# Patient Record
Sex: FEMALE | Race: BLACK OR AFRICAN AMERICAN | Employment: FULL TIME | URBAN - METROPOLITAN AREA
[De-identification: names, ages, dates, MRNs, and addresses within clinical notes are randomized per-mention and may not be internally consistent; named-entity substitution may affect disease eponyms.]

---

## 2018-05-18 ENCOUNTER — HOSPITAL ENCOUNTER (OUTPATIENT)
Dept: MAMMOGRAPHY | Age: 58
Discharge: HOME | End: 2018-05-20
Payer: COMMERCIAL

## 2018-05-18 DIAGNOSIS — Z12.31 VISIT FOR SCREENING MAMMOGRAM: ICD-10-CM

## 2018-05-18 PROCEDURE — 77067 SCR MAMMO BI INCL CAD: CPT

## 2023-03-30 ENCOUNTER — TELEPHONE (OUTPATIENT)
Dept: PRIMARY CARE | Facility: CLINIC | Age: 63
End: 2023-03-30

## 2023-05-01 LAB
ALANINE AMINOTRANSFERASE (SGPT) (U/L) IN SER/PLAS: 13 U/L (ref 7–45)
ALBUMIN (G/DL) IN SER/PLAS: 4.5 G/DL (ref 3.4–5)
ALKALINE PHOSPHATASE (U/L) IN SER/PLAS: 88 U/L (ref 33–136)
ANION GAP IN SER/PLAS: 14 MMOL/L (ref 10–20)
ASPARTATE AMINOTRANSFERASE (SGOT) (U/L) IN SER/PLAS: 16 U/L (ref 9–39)
BASOPHILS (10*3/UL) IN BLOOD BY AUTOMATED COUNT: 0.04 X10E9/L (ref 0–0.1)
BASOPHILS/100 LEUKOCYTES IN BLOOD BY AUTOMATED COUNT: 0.8 % (ref 0–2)
BILIRUBIN TOTAL (MG/DL) IN SER/PLAS: 1 MG/DL (ref 0–1.2)
C REACTIVE PROTEIN (MG/L) IN SER/PLAS: 0.21 MG/DL
CALCIDIOL (25 OH VITAMIN D3) (NG/ML) IN SER/PLAS: 29 NG/ML
CALCIUM (MG/DL) IN SER/PLAS: 10.2 MG/DL (ref 8.6–10.6)
CARBON DIOXIDE, TOTAL (MMOL/L) IN SER/PLAS: 27 MMOL/L (ref 21–32)
CHLORIDE (MMOL/L) IN SER/PLAS: 105 MMOL/L (ref 98–107)
CREATININE (MG/DL) IN SER/PLAS: 0.76 MG/DL (ref 0.5–1.05)
EOSINOPHILS (10*3/UL) IN BLOOD BY AUTOMATED COUNT: 0.13 X10E9/L (ref 0–0.7)
EOSINOPHILS/100 LEUKOCYTES IN BLOOD BY AUTOMATED COUNT: 2.7 % (ref 0–6)
ERYTHROCYTE DISTRIBUTION WIDTH (RATIO) BY AUTOMATED COUNT: 15.1 % (ref 11.5–14.5)
ERYTHROCYTE MEAN CORPUSCULAR HEMOGLOBIN CONCENTRATION (G/DL) BY AUTOMATED: 31.6 G/DL (ref 32–36)
ERYTHROCYTE MEAN CORPUSCULAR VOLUME (FL) BY AUTOMATED COUNT: 91 FL (ref 80–100)
ERYTHROCYTES (10*6/UL) IN BLOOD BY AUTOMATED COUNT: 4.18 X10E12/L (ref 4–5.2)
FERRITIN (UG/LL) IN SER/PLAS: 189 UG/L (ref 8–150)
GFR FEMALE: 87 ML/MIN/1.73M2
GLUCOSE (MG/DL) IN SER/PLAS: 98 MG/DL (ref 74–99)
HEMATOCRIT (%) IN BLOOD BY AUTOMATED COUNT: 38 % (ref 36–46)
HEMOGLOBIN (G/DL) IN BLOOD: 12 G/DL (ref 12–16)
IGA (MG/DL) IN SER/PLAS: 325 MG/DL (ref 70–400)
IGG (MG/DL) IN SER/PLAS: 1310 MG/DL (ref 700–1600)
IGM (MG/DL) IN SER/PLAS: 108 MG/DL (ref 40–230)
IMMATURE GRANULOCYTES/100 LEUKOCYTES IN BLOOD BY AUTOMATED COUNT: 0.2 % (ref 0–0.9)
IRON (UG/DL) IN SER/PLAS: 129 UG/DL (ref 35–150)
IRON BINDING CAPACITY (UG/DL) IN SER/PLAS: 365 UG/DL (ref 240–445)
IRON SATURATION (%) IN SER/PLAS: 35 % (ref 25–45)
LEUKOCYTES (10*3/UL) IN BLOOD BY AUTOMATED COUNT: 4.8 X10E9/L (ref 4.4–11.3)
LYMPHOCYTES (10*3/UL) IN BLOOD BY AUTOMATED COUNT: 1.88 X10E9/L (ref 1.2–4.8)
LYMPHOCYTES/100 LEUKOCYTES IN BLOOD BY AUTOMATED COUNT: 39.4 % (ref 13–44)
MONOCYTES (10*3/UL) IN BLOOD BY AUTOMATED COUNT: 0.26 X10E9/L (ref 0.1–1)
MONOCYTES/100 LEUKOCYTES IN BLOOD BY AUTOMATED COUNT: 5.5 % (ref 2–10)
NEUTROPHILS (10*3/UL) IN BLOOD BY AUTOMATED COUNT: 2.45 X10E9/L (ref 1.2–7.7)
NEUTROPHILS/100 LEUKOCYTES IN BLOOD BY AUTOMATED COUNT: 51.4 % (ref 40–80)
NRBC (PER 100 WBCS) BY AUTOMATED COUNT: 0 /100 WBC (ref 0–0)
PLATELETS (10*3/UL) IN BLOOD AUTOMATED COUNT: 219 X10E9/L (ref 150–450)
POTASSIUM (MMOL/L) IN SER/PLAS: 3.6 MMOL/L (ref 3.5–5.3)
PROTEIN TOTAL: 7.5 G/DL (ref 6.4–8.2)
RHEUMATOID FACTOR (IU/ML) IN SERUM OR PLASMA: 27 IU/ML (ref 0–15)
SEDIMENTATION RATE, ERYTHROCYTE: 32 MM/H (ref 0–30)
SODIUM (MMOL/L) IN SER/PLAS: 142 MMOL/L (ref 136–145)
UREA NITROGEN (MG/DL) IN SER/PLAS: 10 MG/DL (ref 6–23)

## 2023-05-03 LAB
ANTI-NUCLEAR ANTIBODY (ANA): NEGATIVE
HLAB27 TYPING: NEGATIVE

## 2023-05-04 LAB — CITRULLINE ANTIBODY, IGG: 205 UNITS (ref 0–19)

## 2023-05-24 DIAGNOSIS — R73.03 PREDIABETES: Primary | ICD-10-CM

## 2023-05-24 PROBLEM — M54.31 SCIATICA OF RIGHT SIDE: Status: ACTIVE | Noted: 2023-05-24

## 2023-05-24 PROBLEM — J45.909 ASTHMA (HHS-HCC): Status: ACTIVE | Noted: 2023-05-24

## 2023-05-24 PROBLEM — F32.A DEPRESSION: Status: ACTIVE | Noted: 2023-05-24

## 2023-05-24 PROBLEM — F51.04 CHRONIC INSOMNIA: Status: ACTIVE | Noted: 2023-05-24

## 2023-05-24 PROBLEM — E53.8 B12 DEFICIENCY: Status: ACTIVE | Noted: 2023-05-24

## 2023-05-24 PROBLEM — M79.89 LEG SWELLING: Status: ACTIVE | Noted: 2023-05-24

## 2023-05-24 PROBLEM — G93.5: Status: ACTIVE | Noted: 2023-05-24

## 2023-05-24 PROBLEM — I10 HTN, GOAL BELOW 130/80: Status: ACTIVE | Noted: 2023-05-24

## 2023-05-24 PROBLEM — R06.02 SHORT OF BREATH ON EXERTION: Status: ACTIVE | Noted: 2023-05-24

## 2023-05-24 PROBLEM — M25.50 POLYARTHRALGIA: Status: ACTIVE | Noted: 2023-05-24

## 2023-05-24 PROBLEM — Z98.1 S/P LUMBAR SPINAL FUSION: Status: ACTIVE | Noted: 2023-05-24

## 2023-05-24 PROBLEM — R39.9 UTI SYMPTOMS: Status: ACTIVE | Noted: 2023-05-24

## 2023-05-24 PROBLEM — D12.6 ADENOMATOUS COLON POLYP: Status: ACTIVE | Noted: 2023-05-24

## 2023-05-24 PROBLEM — N39.46 MIXED INCONTINENCE, URGE AND STRESS (MALE) (FEMALE): Status: ACTIVE | Noted: 2023-05-24

## 2023-05-24 PROBLEM — R53.83 FATIGUE: Status: ACTIVE | Noted: 2023-05-24

## 2023-05-24 PROBLEM — M71.38 SYNOVIAL CYST OF LUMBAR SPINE: Status: ACTIVE | Noted: 2023-05-24

## 2023-05-24 PROBLEM — J30.9 ALLERGIC RHINITIS: Status: ACTIVE | Noted: 2023-05-24

## 2023-05-24 PROBLEM — F32.0 MILD MAJOR DEPRESSION (CMS-HCC): Status: ACTIVE | Noted: 2023-05-24

## 2023-05-24 PROBLEM — M17.12 ARTHRITIS OF KNEE, LEFT: Status: ACTIVE | Noted: 2023-05-24

## 2023-05-24 PROBLEM — M54.16 RIGHT LUMBAR RADICULOPATHY: Status: ACTIVE | Noted: 2023-05-24

## 2023-05-24 PROBLEM — M16.11 ARTHRITIS OF RIGHT HIP: Status: ACTIVE | Noted: 2023-05-24

## 2023-05-24 PROBLEM — M17.12 PRIMARY LOCALIZED OSTEOARTHROSIS OF LEFT LOWER LEG: Status: ACTIVE | Noted: 2023-05-24

## 2023-05-24 PROBLEM — M05.9 SEROPOSITIVE RHEUMATOID ARTHRITIS (MULTI): Status: ACTIVE | Noted: 2023-05-24

## 2023-05-24 PROBLEM — H52.00 HYPEROPIA: Status: ACTIVE | Noted: 2023-05-24

## 2023-05-24 PROBLEM — J01.90 ACUTE SINUSITIS: Status: ACTIVE | Noted: 2023-05-24

## 2023-05-24 PROBLEM — R39.11 URINARY HESITANCY: Status: ACTIVE | Noted: 2023-05-24

## 2023-05-24 PROBLEM — D64.9 ANEMIA: Status: ACTIVE | Noted: 2023-05-24

## 2023-05-24 PROBLEM — F43.21 GRIEF: Status: ACTIVE | Noted: 2023-05-24

## 2023-05-24 PROBLEM — H25.13 AGE-RELATED NUCLEAR CATARACT OF BOTH EYES: Status: ACTIVE | Noted: 2023-05-24

## 2023-05-24 PROBLEM — H53.9 CHANGE IN VISION: Status: ACTIVE | Noted: 2023-05-24

## 2023-05-24 PROBLEM — M25.662 DECREASED RANGE OF MOTION OF LEFT KNEE: Status: ACTIVE | Noted: 2023-05-24

## 2023-05-24 PROBLEM — R10.2 FEMALE PELVIC PAIN: Status: ACTIVE | Noted: 2023-05-24

## 2023-05-24 PROBLEM — G47.21 DELAYED SLEEP PHASE SYNDROME: Status: ACTIVE | Noted: 2023-05-24

## 2023-05-24 PROBLEM — I10 BENIGN HYPERTENSION: Status: ACTIVE | Noted: 2023-05-24

## 2023-05-24 PROBLEM — E04.2 MULTINODULAR GOITER: Status: ACTIVE | Noted: 2023-05-24

## 2023-05-24 PROBLEM — E78.5 HYPERLIPIDEMIA: Status: ACTIVE | Noted: 2023-05-24

## 2023-05-24 PROBLEM — N81.89 PELVIC FLOOR WEAKNESS: Status: ACTIVE | Noted: 2023-05-24

## 2023-05-24 PROBLEM — N39.41 URGE INCONTINENCE OF URINE: Status: ACTIVE | Noted: 2023-05-24

## 2023-05-24 PROBLEM — R35.0 URINARY FREQUENCY: Status: ACTIVE | Noted: 2023-05-24

## 2023-05-24 PROBLEM — R06.83 SNORING: Status: ACTIVE | Noted: 2023-05-24

## 2023-05-24 PROBLEM — Z96.652 S/P LEFT UNICOMPARTMENTAL KNEE REPLACEMENT: Status: ACTIVE | Noted: 2023-05-24

## 2023-05-24 PROBLEM — M79.606 LEG PAIN: Status: ACTIVE | Noted: 2023-05-24

## 2023-05-24 PROBLEM — R07.9 CHEST PAIN: Status: ACTIVE | Noted: 2023-05-24

## 2023-05-24 PROBLEM — M25.551 PAIN OF RIGHT HIP JOINT: Status: ACTIVE | Noted: 2023-05-24

## 2023-05-24 PROBLEM — M43.16 SPONDYLOLISTHESIS OF LUMBAR REGION: Status: ACTIVE | Noted: 2023-05-24

## 2023-05-24 PROBLEM — Z86.69 HISTORY OF CHIARI MALFORMATION: Status: ACTIVE | Noted: 2023-05-24

## 2023-05-24 PROBLEM — M25.562 LEFT KNEE PAIN: Status: ACTIVE | Noted: 2023-05-24

## 2023-05-24 PROBLEM — N95.2 ATROPHIC VAGINITIS: Status: ACTIVE | Noted: 2023-05-24

## 2023-05-24 PROBLEM — L85.3 XEROSIS CUTIS: Status: ACTIVE | Noted: 2023-05-24

## 2023-05-24 PROBLEM — M48.062 SPINAL STENOSIS OF LUMBAR REGION WITH NEUROGENIC CLAUDICATION: Status: ACTIVE | Noted: 2023-05-24

## 2023-05-24 PROBLEM — M17.9 OSTEOARTHRITIS OF KNEE: Status: ACTIVE | Noted: 2023-05-24

## 2023-05-24 RX ORDER — ATORVASTATIN CALCIUM 40 MG/1
1 TABLET, FILM COATED ORAL DAILY
COMMUNITY
Start: 2022-05-31 | End: 2024-05-17 | Stop reason: WASHOUT

## 2023-05-24 RX ORDER — FLUTICASONE PROPIONATE 50 MCG
SPRAY, SUSPENSION (ML) NASAL
COMMUNITY
Start: 2022-08-18 | End: 2023-12-12 | Stop reason: ALTCHOICE

## 2023-05-24 RX ORDER — TRAMADOL HYDROCHLORIDE 50 MG/1
TABLET ORAL
COMMUNITY
Start: 2022-04-13 | End: 2023-12-12 | Stop reason: ALTCHOICE

## 2023-05-24 RX ORDER — TRIAMCINOLONE ACETONIDE 40 MG/ML
INJECTION, SUSPENSION INTRA-ARTICULAR; INTRAMUSCULAR
COMMUNITY
Start: 2023-05-17 | End: 2023-12-12 | Stop reason: ALTCHOICE

## 2023-05-24 RX ORDER — CHLORHEXIDINE GLUCONATE 40 MG/ML
SOLUTION TOPICAL
COMMUNITY
Start: 2022-03-22 | End: 2023-12-12 | Stop reason: ALTCHOICE

## 2023-05-24 RX ORDER — FOLIC ACID 1 MG/1
1 TABLET ORAL DAILY
COMMUNITY
Start: 2023-05-17 | End: 2023-12-12 | Stop reason: ALTCHOICE

## 2023-05-24 RX ORDER — METFORMIN HYDROCHLORIDE 500 MG/1
1 TABLET ORAL
COMMUNITY
Start: 2021-08-26 | End: 2023-05-24 | Stop reason: SDUPTHER

## 2023-05-24 RX ORDER — METHOTREXATE 2.5 MG/1
TABLET ORAL
COMMUNITY
Start: 2023-05-17 | End: 2023-12-12 | Stop reason: ALTCHOICE

## 2023-05-24 RX ORDER — ABALOPARATIDE 2000 UG/ML
INJECTION, SOLUTION SUBCUTANEOUS
COMMUNITY
Start: 2023-03-26 | End: 2024-05-17 | Stop reason: WASHOUT

## 2023-05-24 RX ORDER — SULFAMETHOXAZOLE AND TRIMETHOPRIM 800; 160 MG/1; MG/1
1 TABLET ORAL 2 TIMES DAILY
COMMUNITY
Start: 2022-08-18 | End: 2023-12-12 | Stop reason: SDUPTHER

## 2023-05-24 RX ORDER — ALBUTEROL SULFATE 90 UG/1
AEROSOL, METERED RESPIRATORY (INHALATION)
COMMUNITY
Start: 2020-08-31 | End: 2023-12-12 | Stop reason: SDUPTHER

## 2023-05-24 RX ORDER — AMLODIPINE BESYLATE 2.5 MG/1
1 TABLET ORAL DAILY
COMMUNITY
Start: 2022-12-05 | End: 2024-05-17 | Stop reason: WASHOUT

## 2023-05-24 RX ORDER — METFORMIN HYDROCHLORIDE 500 MG/1
500 TABLET ORAL
Qty: 180 TABLET | Refills: 1 | Status: SHIPPED | OUTPATIENT
Start: 2023-05-24 | End: 2023-12-12 | Stop reason: SDUPTHER

## 2023-05-24 RX ORDER — OXYBUTYNIN CHLORIDE 10 MG/1
1 TABLET, EXTENDED RELEASE ORAL DAILY
COMMUNITY
Start: 2020-07-29 | End: 2024-05-17 | Stop reason: WASHOUT

## 2023-05-24 RX ORDER — ESTRADIOL 0.1 MG/G
CREAM VAGINAL
COMMUNITY
Start: 2022-10-13 | End: 2023-12-12 | Stop reason: ALTCHOICE

## 2023-05-24 RX ORDER — FERROUS SULFATE 325(65) MG
1 TABLET, DELAYED RELEASE (ENTERIC COATED) ORAL
COMMUNITY
Start: 2022-05-31 | End: 2023-12-12 | Stop reason: ALTCHOICE

## 2023-10-05 ENCOUNTER — TELEPHONE (OUTPATIENT)
Dept: PRIMARY CARE | Facility: CLINIC | Age: 63
End: 2023-10-05
Payer: COMMERCIAL

## 2023-10-05 DIAGNOSIS — Z12.31 BREAST CANCER SCREENING BY MAMMOGRAM: Primary | ICD-10-CM

## 2023-10-24 ENCOUNTER — ANCILLARY PROCEDURE (OUTPATIENT)
Dept: RADIOLOGY | Facility: CLINIC | Age: 63
End: 2023-10-24
Payer: COMMERCIAL

## 2023-10-24 DIAGNOSIS — Z12.31 BREAST CANCER SCREENING BY MAMMOGRAM: ICD-10-CM

## 2023-10-24 PROCEDURE — 77063 BREAST TOMOSYNTHESIS BI: CPT | Mod: BILATERAL PROCEDURE | Performed by: RADIOLOGY

## 2023-10-24 PROCEDURE — 77067 SCR MAMMO BI INCL CAD: CPT | Mod: BILATERAL PROCEDURE | Performed by: RADIOLOGY

## 2023-10-24 PROCEDURE — 77063 BREAST TOMOSYNTHESIS BI: CPT

## 2023-12-12 ENCOUNTER — OFFICE VISIT (OUTPATIENT)
Dept: PRIMARY CARE | Facility: CLINIC | Age: 63
End: 2023-12-12
Payer: COMMERCIAL

## 2023-12-12 VITALS
RESPIRATION RATE: 15 BRPM | HEIGHT: 65 IN | WEIGHT: 183.3 LBS | HEART RATE: 80 BPM | DIASTOLIC BLOOD PRESSURE: 62 MMHG | BODY MASS INDEX: 30.54 KG/M2 | TEMPERATURE: 97.9 F | OXYGEN SATURATION: 98 % | SYSTOLIC BLOOD PRESSURE: 110 MMHG

## 2023-12-12 DIAGNOSIS — Z00.00 ANNUAL PHYSICAL EXAM: Primary | ICD-10-CM

## 2023-12-12 DIAGNOSIS — R39.9 UTI SYMPTOMS: ICD-10-CM

## 2023-12-12 DIAGNOSIS — M05.9 SEROPOSITIVE RHEUMATOID ARTHRITIS (MULTI): ICD-10-CM

## 2023-12-12 DIAGNOSIS — I10 BENIGN HYPERTENSION: ICD-10-CM

## 2023-12-12 DIAGNOSIS — E11.9 TYPE 2 DIABETES MELLITUS WITHOUT COMPLICATION, WITHOUT LONG-TERM CURRENT USE OF INSULIN (MULTI): ICD-10-CM

## 2023-12-12 DIAGNOSIS — E66.9 CLASS 1 OBESITY WITH SERIOUS COMORBIDITY AND BODY MASS INDEX (BMI) OF 30.0 TO 30.9 IN ADULT, UNSPECIFIED OBESITY TYPE: ICD-10-CM

## 2023-12-12 DIAGNOSIS — Q07.00 ARNOLD-CHIARI SYNDROME WITHOUT SPINA BIFIDA OR HYDROCEPHALUS (MULTI): ICD-10-CM

## 2023-12-12 DIAGNOSIS — G47.21 DELAYED SLEEP PHASE SYNDROME: ICD-10-CM

## 2023-12-12 DIAGNOSIS — E04.2 MULTINODULAR GOITER: ICD-10-CM

## 2023-12-12 DIAGNOSIS — R73.03 PREDIABETES: ICD-10-CM

## 2023-12-12 DIAGNOSIS — E78.5 HYPERLIPIDEMIA, UNSPECIFIED HYPERLIPIDEMIA TYPE: ICD-10-CM

## 2023-12-12 DIAGNOSIS — N63.15 MASS OVERLAPPING MULTIPLE QUADRANTS OF RIGHT BREAST: ICD-10-CM

## 2023-12-12 DIAGNOSIS — J45.909 ASTHMA, UNSPECIFIED ASTHMA SEVERITY, UNSPECIFIED WHETHER COMPLICATED, UNSPECIFIED WHETHER PERSISTENT (HHS-HCC): ICD-10-CM

## 2023-12-12 PROCEDURE — 1036F TOBACCO NON-USER: CPT | Performed by: STUDENT IN AN ORGANIZED HEALTH CARE EDUCATION/TRAINING PROGRAM

## 2023-12-12 PROCEDURE — 99396 PREV VISIT EST AGE 40-64: CPT | Performed by: STUDENT IN AN ORGANIZED HEALTH CARE EDUCATION/TRAINING PROGRAM

## 2023-12-12 PROCEDURE — 99214 OFFICE O/P EST MOD 30 MIN: CPT | Performed by: STUDENT IN AN ORGANIZED HEALTH CARE EDUCATION/TRAINING PROGRAM

## 2023-12-12 PROCEDURE — 3008F BODY MASS INDEX DOCD: CPT | Performed by: STUDENT IN AN ORGANIZED HEALTH CARE EDUCATION/TRAINING PROGRAM

## 2023-12-12 PROCEDURE — 3078F DIAST BP <80 MM HG: CPT | Performed by: STUDENT IN AN ORGANIZED HEALTH CARE EDUCATION/TRAINING PROGRAM

## 2023-12-12 PROCEDURE — 3074F SYST BP LT 130 MM HG: CPT | Performed by: STUDENT IN AN ORGANIZED HEALTH CARE EDUCATION/TRAINING PROGRAM

## 2023-12-12 RX ORDER — LEVOTHYROXINE SODIUM 112 UG/1
TABLET ORAL
COMMUNITY
Start: 2023-11-19 | End: 2023-12-12 | Stop reason: ALTCHOICE

## 2023-12-12 RX ORDER — ALBUTEROL SULFATE 90 UG/1
2 AEROSOL, METERED RESPIRATORY (INHALATION) EVERY 4 HOURS PRN
Qty: 18 G | Refills: 0 | Status: SHIPPED | OUTPATIENT
Start: 2023-12-12 | End: 2023-12-14 | Stop reason: SDUPTHER

## 2023-12-12 RX ORDER — PEN NEEDLE, DIABETIC 31 GX5/16"
NEEDLE, DISPOSABLE MISCELLANEOUS
COMMUNITY
Start: 2023-09-29

## 2023-12-12 RX ORDER — SULFAMETHOXAZOLE AND TRIMETHOPRIM 800; 160 MG/1; MG/1
1 TABLET ORAL 2 TIMES DAILY
Qty: 20 TABLET | Refills: 0 | Status: SHIPPED | OUTPATIENT
Start: 2023-12-12 | End: 2023-12-14 | Stop reason: SDUPTHER

## 2023-12-12 RX ORDER — METFORMIN HYDROCHLORIDE 500 MG/1
500 TABLET ORAL
Qty: 180 TABLET | Refills: 1 | Status: SHIPPED | OUTPATIENT
Start: 2023-12-12 | End: 2023-12-14 | Stop reason: SDUPTHER

## 2023-12-12 ASSESSMENT — PATIENT HEALTH QUESTIONNAIRE - PHQ9
2. FEELING DOWN, DEPRESSED OR HOPELESS: NOT AT ALL
1. LITTLE INTEREST OR PLEASURE IN DOING THINGS: NOT AT ALL
SUM OF ALL RESPONSES TO PHQ9 QUESTIONS 1 & 2: 0

## 2023-12-12 ASSESSMENT — LIFESTYLE VARIABLES
SKIP TO QUESTIONS 9-10: 1
HOW MANY STANDARD DRINKS CONTAINING ALCOHOL DO YOU HAVE ON A TYPICAL DAY: PATIENT DOES NOT DRINK
AUDIT-C TOTAL SCORE: 0
HOW OFTEN DO YOU HAVE A DRINK CONTAINING ALCOHOL: NEVER
HOW OFTEN DO YOU HAVE SIX OR MORE DRINKS ON ONE OCCASION: NEVER

## 2023-12-12 NOTE — PROGRESS NOTES
Subjective   Patient ID: Bettina Kowalski is a 63 y.o. female with significant history of hypertension, hyperlipidemia, prediabetes, goiter who presents for Annual Exam.  HPI    She is here for annual physical  She feels tired and has low energy.  She reports that she has trouble falling asleep.  Does not fall asleep till after 2 AM and wakes up around 5:30 AM.  She also reports that she has a right flank pain and is concerned about possible UTI.  Denies any hematuria or dysuria.  She has mixed urinary incontinence for which she takes oxybutynin but unfortunately does not alleviate her symptoms.  We discussed about following up with Dr. Cespedes.    She also reports that she has had a painful lump in the right breast that has been present for over 3 months and progressively getting bigger.  She had completed her annual breast cancer screening which was benign however it did show dense breast tissue.    Health Maintenance:  -Colonoscopy (start at age 45): 2020, repeat in 5 years   -Mammogram (start at age 40): 10/5/23   -Pap smear (start at age 21): 2014, due  - DEXA (start at age 65): NA     - Influenza vaccine: Recommended annually  - COVID Vaccine Status: Vaccinated  -Shingles: Vaccinated  Tdap: 2020    Review of Systems   All other systems reviewed and are negative.      Visit Vitals  /62   Pulse 80   Temp 36.6 °C (97.9 °F)   Resp 15          Objective   Physical Exam  Constitutional:       General: She is not in acute distress.     Appearance: Normal appearance.   HENT:      Head: Normocephalic and atraumatic.   Eyes:      General: No scleral icterus.     Conjunctiva/sclera: Conjunctivae normal.   Cardiovascular:      Rate and Rhythm: Normal rate and regular rhythm.      Heart sounds: Normal heart sounds.   Pulmonary:      Effort: Pulmonary effort is normal.      Breath sounds: Normal breath sounds. No wheezing.   Chest:   Breasts:     Right: Tenderness present.       Abdominal:      General: Bowel sounds  are normal. There is no distension.      Palpations: Abdomen is soft.      Tenderness: There is no abdominal tenderness.   Musculoskeletal:      Cervical back: Neck supple.      Right lower leg: No edema.      Left lower leg: No edema.   Lymphadenopathy:      Cervical: No cervical adenopathy.   Skin:     General: Skin is warm and dry.   Neurological:      General: No focal deficit present.      Mental Status: She is alert and oriented to person, place, and time.   Psychiatric:         Mood and Affect: Mood normal.         Behavior: Behavior normal.         Assessment/Plan   Problem List Items Addressed This Visit       Benign hypertension    Relevant Orders    Comprehensive Metabolic Panel    CBC and Auto Differential    Delayed sleep phase syndrome    Relevant Orders    Referral to Adult Sleep Medicine    Hyperlipidemia    Relevant Orders    Lipid Panel    Multinodular goiter    Relevant Orders    TSH with reflex to Free T4 if abnormal    Prediabetes    Relevant Medications    metFORMIN (Glucophage) 500 mg tablet    Other Relevant Orders    Hemoglobin A1C    Seropositive rheumatoid arthritis (CMS/HCC)     Follows up with rheumatology         UTI symptoms    Relevant Medications    sulfamethoxazole-trimethoprim (Bactrim DS) 800-160 mg tablet    Other Relevant Orders    Urinalysis with Reflex Microscopic    Urine Culture    Asthma    Relevant Medications    albuterol 90 mcg/actuation inhaler    Arnold-Chiari syndrome without spina bifida or hydrocephalus (CMS/HCC)     Asymptomatic, continue to monitor         Type 2 diabetes mellitus without complication, without long-term current use of insulin (CMS/[facility], located within [facility])     Repeat blood work ordered          Other Visit Diagnoses       Annual physical exam    -  Primary    Class 1 obesity with serious comorbidity and body mass index (BMI) of 30.0 to 30.9 in adult, unspecified obesity type        Relevant Orders    Comprehensive Metabolic Panel    CBC and Auto Differential     Vitamin D 25-Hydroxy,Total (for eval of Vitamin D levels)    Hemoglobin A1C    Lipid Panel    Mass overlapping multiple quadrants of right breast        Relevant Orders    BI US breast complete right

## 2023-12-12 NOTE — PATIENT INSTRUCTIONS
Referral to sleep medicine  Please follow up with Dr. Singh Cespedes, GYN  Continue with current medications.  Blood work before your next visit.  If you receive medical information from My Chart, your results will be released into your online chart. This means you may view or see results before someone from our office contact you directly.  Please keep in mind that if blood work or imaging were ordered during your visit, all the nonurgent lab results will be discussed with you at your next office visit.  Please arrive 15 minutes before your appointment.   Return to office in 4-6 months or as needed

## 2023-12-13 ENCOUNTER — LAB (OUTPATIENT)
Dept: LAB | Facility: LAB | Age: 63
End: 2023-12-13
Payer: COMMERCIAL

## 2023-12-13 DIAGNOSIS — R73.03 PREDIABETES: ICD-10-CM

## 2023-12-13 DIAGNOSIS — E66.9 CLASS 1 OBESITY WITH SERIOUS COMORBIDITY AND BODY MASS INDEX (BMI) OF 30.0 TO 30.9 IN ADULT, UNSPECIFIED OBESITY TYPE: ICD-10-CM

## 2023-12-13 DIAGNOSIS — E04.2 MULTINODULAR GOITER: ICD-10-CM

## 2023-12-13 DIAGNOSIS — R39.9 UTI SYMPTOMS: ICD-10-CM

## 2023-12-13 DIAGNOSIS — E78.5 HYPERLIPIDEMIA, UNSPECIFIED HYPERLIPIDEMIA TYPE: ICD-10-CM

## 2023-12-13 DIAGNOSIS — I10 BENIGN HYPERTENSION: ICD-10-CM

## 2023-12-13 LAB
25(OH)D3 SERPL-MCNC: 31 NG/ML (ref 30–100)
ALBUMIN SERPL BCP-MCNC: 4.5 G/DL (ref 3.4–5)
ALP SERPL-CCNC: 84 U/L (ref 33–136)
ALT SERPL W P-5'-P-CCNC: 17 U/L (ref 7–45)
ANION GAP SERPL CALC-SCNC: 15 MMOL/L (ref 10–20)
APPEARANCE UR: ABNORMAL
AST SERPL W P-5'-P-CCNC: 20 U/L (ref 9–39)
BASOPHILS # BLD AUTO: 0.05 X10*3/UL (ref 0–0.1)
BASOPHILS NFR BLD AUTO: 1 %
BILIRUB SERPL-MCNC: 0.8 MG/DL (ref 0–1.2)
BILIRUB UR STRIP.AUTO-MCNC: NEGATIVE MG/DL
BUN SERPL-MCNC: 13 MG/DL (ref 6–23)
CALCIUM SERPL-MCNC: 10.1 MG/DL (ref 8.6–10.6)
CHLORIDE SERPL-SCNC: 105 MMOL/L (ref 98–107)
CHOLEST SERPL-MCNC: 210 MG/DL (ref 0–199)
CHOLESTEROL/HDL RATIO: 2.9
CO2 SERPL-SCNC: 28 MMOL/L (ref 21–32)
COLOR UR: YELLOW
CREAT SERPL-MCNC: 0.75 MG/DL (ref 0.5–1.05)
EOSINOPHIL # BLD AUTO: 0.17 X10*3/UL (ref 0–0.7)
EOSINOPHIL NFR BLD AUTO: 3.3 %
ERYTHROCYTE [DISTWIDTH] IN BLOOD BY AUTOMATED COUNT: 14.6 % (ref 11.5–14.5)
EST. AVERAGE GLUCOSE BLD GHB EST-MCNC: 114 MG/DL
GFR SERPL CREATININE-BSD FRML MDRD: 89 ML/MIN/1.73M*2
GLUCOSE SERPL-MCNC: 88 MG/DL (ref 74–99)
GLUCOSE UR STRIP.AUTO-MCNC: NEGATIVE MG/DL
HBA1C MFR BLD: 5.6 %
HCT VFR BLD AUTO: 37.7 % (ref 36–46)
HDLC SERPL-MCNC: 72.1 MG/DL
HGB BLD-MCNC: 11.8 G/DL (ref 12–16)
IMM GRANULOCYTES # BLD AUTO: 0.01 X10*3/UL (ref 0–0.7)
IMM GRANULOCYTES NFR BLD AUTO: 0.2 % (ref 0–0.9)
KETONES UR STRIP.AUTO-MCNC: NEGATIVE MG/DL
LDLC SERPL CALC-MCNC: 121 MG/DL
LEUKOCYTE ESTERASE UR QL STRIP.AUTO: NEGATIVE
LYMPHOCYTES # BLD AUTO: 1.84 X10*3/UL (ref 1.2–4.8)
LYMPHOCYTES NFR BLD AUTO: 35.5 %
MCH RBC QN AUTO: 28.9 PG (ref 26–34)
MCHC RBC AUTO-ENTMCNC: 31.3 G/DL (ref 32–36)
MCV RBC AUTO: 92 FL (ref 80–100)
MONOCYTES # BLD AUTO: 0.35 X10*3/UL (ref 0.1–1)
MONOCYTES NFR BLD AUTO: 6.7 %
NEUTROPHILS # BLD AUTO: 2.77 X10*3/UL (ref 1.2–7.7)
NEUTROPHILS NFR BLD AUTO: 53.3 %
NITRITE UR QL STRIP.AUTO: NEGATIVE
NON HDL CHOLESTEROL: 138 MG/DL (ref 0–149)
NRBC BLD-RTO: 0 /100 WBCS (ref 0–0)
PH UR STRIP.AUTO: 6 [PH]
PLATELET # BLD AUTO: 209 X10*3/UL (ref 150–450)
POTASSIUM SERPL-SCNC: 4 MMOL/L (ref 3.5–5.3)
PROT SERPL-MCNC: 7.6 G/DL (ref 6.4–8.2)
PROT UR STRIP.AUTO-MCNC: NEGATIVE MG/DL
RBC # BLD AUTO: 4.08 X10*6/UL (ref 4–5.2)
RBC # UR STRIP.AUTO: NEGATIVE /UL
SODIUM SERPL-SCNC: 144 MMOL/L (ref 136–145)
SP GR UR STRIP.AUTO: 1.01
TRIGL SERPL-MCNC: 84 MG/DL (ref 0–149)
TSH SERPL-ACNC: 1.09 MIU/L (ref 0.44–3.98)
UROBILINOGEN UR STRIP.AUTO-MCNC: <2 MG/DL
VLDL: 17 MG/DL (ref 0–40)
WBC # BLD AUTO: 5.2 X10*3/UL (ref 4.4–11.3)

## 2023-12-13 PROCEDURE — 80061 LIPID PANEL: CPT

## 2023-12-13 PROCEDURE — 87086 URINE CULTURE/COLONY COUNT: CPT

## 2023-12-13 PROCEDURE — 36415 COLL VENOUS BLD VENIPUNCTURE: CPT

## 2023-12-13 PROCEDURE — 85025 COMPLETE CBC W/AUTO DIFF WBC: CPT

## 2023-12-13 PROCEDURE — 80053 COMPREHEN METABOLIC PANEL: CPT

## 2023-12-13 PROCEDURE — 83036 HEMOGLOBIN GLYCOSYLATED A1C: CPT

## 2023-12-13 PROCEDURE — 82306 VITAMIN D 25 HYDROXY: CPT

## 2023-12-13 PROCEDURE — 84443 ASSAY THYROID STIM HORMONE: CPT

## 2023-12-13 PROCEDURE — 81003 URINALYSIS AUTO W/O SCOPE: CPT

## 2023-12-14 DIAGNOSIS — R39.9 UTI SYMPTOMS: ICD-10-CM

## 2023-12-14 DIAGNOSIS — J45.909 ASTHMA, UNSPECIFIED ASTHMA SEVERITY, UNSPECIFIED WHETHER COMPLICATED, UNSPECIFIED WHETHER PERSISTENT (HHS-HCC): ICD-10-CM

## 2023-12-14 DIAGNOSIS — R73.03 PREDIABETES: ICD-10-CM

## 2023-12-14 RX ORDER — SULFAMETHOXAZOLE AND TRIMETHOPRIM 800; 160 MG/1; MG/1
1 TABLET ORAL 2 TIMES DAILY
Qty: 20 TABLET | Refills: 0 | Status: SHIPPED | OUTPATIENT
Start: 2023-12-14 | End: 2023-12-24

## 2023-12-14 RX ORDER — ALBUTEROL SULFATE 90 UG/1
2 AEROSOL, METERED RESPIRATORY (INHALATION) EVERY 4 HOURS PRN
Qty: 18 G | Refills: 0 | Status: SHIPPED | OUTPATIENT
Start: 2023-12-14 | End: 2024-05-17 | Stop reason: SDUPTHER

## 2023-12-14 RX ORDER — METFORMIN HYDROCHLORIDE 500 MG/1
500 TABLET ORAL
Qty: 180 TABLET | Refills: 1 | Status: SHIPPED | OUTPATIENT
Start: 2023-12-14 | End: 2024-01-16

## 2023-12-15 LAB — BACTERIA UR CULT: NORMAL

## 2023-12-19 ENCOUNTER — APPOINTMENT (OUTPATIENT)
Dept: PRIMARY CARE | Facility: CLINIC | Age: 63
End: 2023-12-19
Payer: COMMERCIAL

## 2023-12-29 ENCOUNTER — ANCILLARY PROCEDURE (OUTPATIENT)
Dept: RADIOLOGY | Facility: CLINIC | Age: 63
End: 2023-12-29
Payer: COMMERCIAL

## 2023-12-29 ENCOUNTER — APPOINTMENT (OUTPATIENT)
Dept: RADIOLOGY | Facility: CLINIC | Age: 63
End: 2023-12-29
Payer: COMMERCIAL

## 2023-12-29 DIAGNOSIS — N63.15 MASS OVERLAPPING MULTIPLE QUADRANTS OF RIGHT BREAST: ICD-10-CM

## 2023-12-29 PROCEDURE — 76982 USE 1ST TARGET LESION: CPT | Mod: RT

## 2023-12-29 PROCEDURE — 76642 ULTRASOUND BREAST LIMITED: CPT | Mod: RIGHT SIDE | Performed by: STUDENT IN AN ORGANIZED HEALTH CARE EDUCATION/TRAINING PROGRAM

## 2023-12-29 PROCEDURE — 76642 ULTRASOUND BREAST LIMITED: CPT | Mod: RT

## 2024-01-04 ENCOUNTER — TELEPHONE (OUTPATIENT)
Dept: PRIMARY CARE | Facility: CLINIC | Age: 64
End: 2024-01-04
Payer: COMMERCIAL

## 2024-01-04 NOTE — TELEPHONE ENCOUNTER
----- Message from Maris Vanessa MD sent at 12/29/2023 12:04 PM EST -----  Please call the patient   - the breast US did not show any concerning mass in the breast. If she continues to have pain in the breast in that area, she should see her GYN for further evaluation

## 2024-01-16 DIAGNOSIS — R73.03 PREDIABETES: ICD-10-CM

## 2024-01-16 RX ORDER — METFORMIN HYDROCHLORIDE 500 MG/1
500 TABLET ORAL
Qty: 180 TABLET | Refills: 1 | Status: SHIPPED | OUTPATIENT
Start: 2024-01-16

## 2024-02-12 ENCOUNTER — APPOINTMENT (OUTPATIENT)
Dept: SLEEP MEDICINE | Facility: CLINIC | Age: 64
End: 2024-02-12
Payer: COMMERCIAL

## 2024-03-11 ENCOUNTER — APPOINTMENT (OUTPATIENT)
Dept: ORTHOPEDIC SURGERY | Facility: CLINIC | Age: 64
End: 2024-03-11
Payer: COMMERCIAL

## 2024-03-12 ENCOUNTER — OFFICE VISIT (OUTPATIENT)
Dept: ORTHOPEDIC SURGERY | Facility: CLINIC | Age: 64
End: 2024-03-12
Payer: COMMERCIAL

## 2024-03-12 DIAGNOSIS — M16.11 ARTHRITIS OF RIGHT HIP: Primary | ICD-10-CM

## 2024-03-12 PROCEDURE — 20611 DRAIN/INJ JOINT/BURSA W/US: CPT | Performed by: FAMILY MEDICINE

## 2024-03-12 PROCEDURE — 99214 OFFICE O/P EST MOD 30 MIN: CPT | Performed by: FAMILY MEDICINE

## 2024-03-12 PROCEDURE — 1036F TOBACCO NON-USER: CPT | Performed by: FAMILY MEDICINE

## 2024-03-12 PROCEDURE — 3008F BODY MASS INDEX DOCD: CPT | Performed by: FAMILY MEDICINE

## 2024-03-12 RX ORDER — LIDOCAINE HYDROCHLORIDE 10 MG/ML
4 INJECTION INFILTRATION; PERINEURAL
Status: COMPLETED | OUTPATIENT
Start: 2024-03-12 | End: 2024-03-12

## 2024-03-12 RX ORDER — TRIAMCINOLONE ACETONIDE 40 MG/ML
80 INJECTION, SUSPENSION INTRA-ARTICULAR; INTRAMUSCULAR
Status: COMPLETED | OUTPATIENT
Start: 2024-03-12 | End: 2024-03-12

## 2024-03-12 RX ADMIN — TRIAMCINOLONE ACETONIDE 80 MG: 40 INJECTION, SUSPENSION INTRA-ARTICULAR; INTRAMUSCULAR at 11:05

## 2024-03-12 RX ADMIN — LIDOCAINE HYDROCHLORIDE 4 ML: 10 INJECTION INFILTRATION; PERINEURAL at 11:05

## 2024-03-12 NOTE — PROGRESS NOTES
** Please excuse any errors in grammar or translation related to this dictation. Voice recognition software was utilized to prepare this document. **    Assessment & Plan:  Patient here for ongoing management of right hip arthritis.  Patient reports progression in symptoms over the last several months.  She had intended to pursue arthroplasty but is delaying for the time being due to some family scheduling conflicts.  Discussed with patient option for completion of steroid injection today to hopefully mitigate some of her symptoms.  It was explained to patient that if steroid injection was completed would have to delay surgery at least 90 days from the injection date.  Explained risk and benefit of procedure and patient elected to proceed with completion, see below.  If this injection does help patient can follow-up to have completed every 3 more months as symptoms dictate.  For intermittent symptoms can utilize over-the-counter NSAID or acetaminophen, ice pack or heating pad.   Encouraged activities as tolerated to maintain joint mobility and muscle strength. All questions answered and patient agrees to this plan.     Chief complaint:  Right hip pain  HPI:  62 y/o patient,  history of hypertension, spinal stenosis s/p lumbar fusion, left knee arthroplasty, prediabetes, depression, hyperlipidemia , presents with right hip pain.  This complaint has been ongoing for 2 years.  No mechanism of injury reported at onset. Symptoms have progressively worsened with time.  Pain is most prominent at groin though it is also present at buttock and down posterior thigh.  It is associated with sensation of stiffness, loss of joint motion.  To date, patient has tried a variety of treatments to include Aleve and physical therapy with little sustained effect. Previously saw Dr. Myles for this with last visit being February 2023. Subsequently saw Dr. Saleem for hip injection at that time; does note recall if it helped.  Denies  previous surgery to this site.  Ultimately patient plans to have hip arthroplasty completed however is delayed for the time being due to some conflicts in her family.    Exam:  RIGHT Hip Exam:  [Antalgic gait]  No warmth, erythema or ecchymosis overlying.  Limited AROM flexion <90deg, IR and ER.  NTTP over greater trochanter, glute tendons, proximal ITB, ischial tuberosity  [5]/5 strength of hip flexion, abduction, & adduction  SILT  [ + ]Log roll pain, [+ ]FADIR pain, [ + ]MUSA pain, [ +]Stinchfield  [ + ] C-sign    Results:  X-rays of right hip obtained February 2023 independently interpreted as advanced arthritic changes.    Reviewed previous encounters with Dr. Saleem and Dr. Myles from February 2023.    Procedure:  Patient ID: Bettina Kowalski is a 63 y.o. female.    L Inj/Asp: R hip joint on 3/12/2024 11:05 AM  Indications: pain  Details: 22 G needle, ultrasound-guided anterior approach  Medications: 80 mg triamcinolone acetonide 40 mg/mL; 4 mL lidocaine 10 mg/mL (1 %)  Outcome: tolerated well, no immediate complications    Procedure risk factors to include increased pain, bleeding, infection, neurovascular injury, soft tissue injury, transient elevation of blood glucose and blood pressure, and adverse reaction to medication were discussed with the patient. Patient understands there is a moderate risk of morbidity from undergoing the procedure.  Procedure, treatment alternatives, risks and benefits explained, specific risks discussed. Consent was given by the patient. Immediately prior to procedure a time out was called to verify the correct patient, procedure, equipment, support staff and site/side marked as required. Patient was prepped and draped in the usual sterile fashion.

## 2024-04-12 ENCOUNTER — APPOINTMENT (OUTPATIENT)
Dept: PRIMARY CARE | Facility: CLINIC | Age: 64
End: 2024-04-12
Payer: COMMERCIAL

## 2024-05-07 ENCOUNTER — APPOINTMENT (OUTPATIENT)
Dept: OPHTHALMOLOGY | Facility: CLINIC | Age: 64
End: 2024-05-07
Payer: COMMERCIAL

## 2024-05-17 ENCOUNTER — OFFICE VISIT (OUTPATIENT)
Dept: PRIMARY CARE | Facility: CLINIC | Age: 64
End: 2024-05-17
Payer: COMMERCIAL

## 2024-05-17 ENCOUNTER — HOSPITAL ENCOUNTER (OUTPATIENT)
Dept: RADIOLOGY | Facility: CLINIC | Age: 64
Discharge: HOME | End: 2024-05-17
Payer: COMMERCIAL

## 2024-05-17 ENCOUNTER — LAB (OUTPATIENT)
Dept: LAB | Facility: LAB | Age: 64
End: 2024-05-17
Payer: COMMERCIAL

## 2024-05-17 VITALS
HEIGHT: 65 IN | TEMPERATURE: 97 F | WEIGHT: 184.3 LBS | BODY MASS INDEX: 30.71 KG/M2 | RESPIRATION RATE: 15 BRPM | OXYGEN SATURATION: 96 % | HEART RATE: 77 BPM | SYSTOLIC BLOOD PRESSURE: 128 MMHG | DIASTOLIC BLOOD PRESSURE: 70 MMHG

## 2024-05-17 DIAGNOSIS — R39.9 UTI SYMPTOMS: ICD-10-CM

## 2024-05-17 DIAGNOSIS — J45.909 ASTHMA, UNSPECIFIED ASTHMA SEVERITY, UNSPECIFIED WHETHER COMPLICATED, UNSPECIFIED WHETHER PERSISTENT (HHS-HCC): ICD-10-CM

## 2024-05-17 DIAGNOSIS — R39.9 UTI SYMPTOMS: Primary | ICD-10-CM

## 2024-05-17 DIAGNOSIS — M54.2 CERVICAL PAIN (NECK): ICD-10-CM

## 2024-05-17 PROBLEM — E66.9 OBESITY: Status: ACTIVE | Noted: 2023-12-13

## 2024-05-17 PROBLEM — N63.0 MASS OF BREAST: Status: ACTIVE | Noted: 2024-05-17

## 2024-05-17 PROBLEM — H25.10 NUCLEAR SENILE CATARACT: Status: ACTIVE | Noted: 2020-12-23

## 2024-05-17 PROBLEM — F43.81 PROLONGED GRIEF DISORDER: Status: ACTIVE | Noted: 2024-05-17

## 2024-05-17 PROBLEM — M15.9 GENERALIZED OSTEOARTHRITIS OF MULTIPLE SITES: Status: ACTIVE | Noted: 2024-05-17

## 2024-05-17 LAB
ALBUMIN SERPL BCP-MCNC: 4.7 G/DL (ref 3.4–5)
ALP SERPL-CCNC: 96 U/L (ref 33–136)
ALT SERPL W P-5'-P-CCNC: 21 U/L (ref 7–45)
ANION GAP SERPL CALC-SCNC: 16 MMOL/L (ref 10–20)
AST SERPL W P-5'-P-CCNC: 22 U/L (ref 9–39)
BASOPHILS # BLD AUTO: 0.05 X10*3/UL (ref 0–0.1)
BASOPHILS NFR BLD AUTO: 0.7 %
BILIRUB SERPL-MCNC: 0.5 MG/DL (ref 0–1.2)
BUN SERPL-MCNC: 13 MG/DL (ref 6–23)
CALCIUM SERPL-MCNC: 10.3 MG/DL (ref 8.6–10.6)
CHLORIDE SERPL-SCNC: 102 MMOL/L (ref 98–107)
CO2 SERPL-SCNC: 27 MMOL/L (ref 21–32)
CREAT SERPL-MCNC: 0.75 MG/DL (ref 0.5–1.05)
EGFRCR SERPLBLD CKD-EPI 2021: 88 ML/MIN/1.73M*2
EOSINOPHIL # BLD AUTO: 0.22 X10*3/UL (ref 0–0.7)
EOSINOPHIL NFR BLD AUTO: 3.2 %
ERYTHROCYTE [DISTWIDTH] IN BLOOD BY AUTOMATED COUNT: 15.2 % (ref 11.5–14.5)
GLUCOSE SERPL-MCNC: 87 MG/DL (ref 74–99)
HCT VFR BLD AUTO: 39.4 % (ref 36–46)
HGB BLD-MCNC: 12 G/DL (ref 12–16)
IMM GRANULOCYTES # BLD AUTO: 0.02 X10*3/UL (ref 0–0.7)
IMM GRANULOCYTES NFR BLD AUTO: 0.3 % (ref 0–0.9)
LYMPHOCYTES # BLD AUTO: 2.43 X10*3/UL (ref 1.2–4.8)
LYMPHOCYTES NFR BLD AUTO: 35 %
MCH RBC QN AUTO: 28.2 PG (ref 26–34)
MCHC RBC AUTO-ENTMCNC: 30.5 G/DL (ref 32–36)
MCV RBC AUTO: 93 FL (ref 80–100)
MONOCYTES # BLD AUTO: 0.43 X10*3/UL (ref 0.1–1)
MONOCYTES NFR BLD AUTO: 6.2 %
NEUTROPHILS # BLD AUTO: 3.8 X10*3/UL (ref 1.2–7.7)
NEUTROPHILS NFR BLD AUTO: 54.6 %
NRBC BLD-RTO: 0 /100 WBCS (ref 0–0)
PLATELET # BLD AUTO: 295 X10*3/UL (ref 150–450)
POTASSIUM SERPL-SCNC: 4.3 MMOL/L (ref 3.5–5.3)
PROT SERPL-MCNC: 7.8 G/DL (ref 6.4–8.2)
RBC # BLD AUTO: 4.25 X10*6/UL (ref 4–5.2)
SODIUM SERPL-SCNC: 141 MMOL/L (ref 136–145)
WBC # BLD AUTO: 7 X10*3/UL (ref 4.4–11.3)

## 2024-05-17 PROCEDURE — 99214 OFFICE O/P EST MOD 30 MIN: CPT | Performed by: STUDENT IN AN ORGANIZED HEALTH CARE EDUCATION/TRAINING PROGRAM

## 2024-05-17 PROCEDURE — 3078F DIAST BP <80 MM HG: CPT | Performed by: STUDENT IN AN ORGANIZED HEALTH CARE EDUCATION/TRAINING PROGRAM

## 2024-05-17 PROCEDURE — 72050 X-RAY EXAM NECK SPINE 4/5VWS: CPT

## 2024-05-17 PROCEDURE — 1160F RVW MEDS BY RX/DR IN RCRD: CPT | Performed by: STUDENT IN AN ORGANIZED HEALTH CARE EDUCATION/TRAINING PROGRAM

## 2024-05-17 PROCEDURE — 36415 COLL VENOUS BLD VENIPUNCTURE: CPT

## 2024-05-17 PROCEDURE — 3008F BODY MASS INDEX DOCD: CPT | Performed by: STUDENT IN AN ORGANIZED HEALTH CARE EDUCATION/TRAINING PROGRAM

## 2024-05-17 PROCEDURE — 72050 X-RAY EXAM NECK SPINE 4/5VWS: CPT | Performed by: RADIOLOGY

## 2024-05-17 PROCEDURE — 1036F TOBACCO NON-USER: CPT | Performed by: STUDENT IN AN ORGANIZED HEALTH CARE EDUCATION/TRAINING PROGRAM

## 2024-05-17 PROCEDURE — 87086 URINE CULTURE/COLONY COUNT: CPT

## 2024-05-17 PROCEDURE — 80053 COMPREHEN METABOLIC PANEL: CPT

## 2024-05-17 PROCEDURE — 85025 COMPLETE CBC W/AUTO DIFF WBC: CPT

## 2024-05-17 PROCEDURE — 3074F SYST BP LT 130 MM HG: CPT | Performed by: STUDENT IN AN ORGANIZED HEALTH CARE EDUCATION/TRAINING PROGRAM

## 2024-05-17 PROCEDURE — 1159F MED LIST DOCD IN RCRD: CPT | Performed by: STUDENT IN AN ORGANIZED HEALTH CARE EDUCATION/TRAINING PROGRAM

## 2024-05-17 PROCEDURE — 81003 URINALYSIS AUTO W/O SCOPE: CPT

## 2024-05-17 RX ORDER — BACLOFEN 10 MG/1
10 TABLET ORAL 2 TIMES DAILY
Qty: 60 TABLET | Refills: 2 | Status: SHIPPED | OUTPATIENT
Start: 2024-05-17 | End: 2024-05-30 | Stop reason: SINTOL

## 2024-05-17 RX ORDER — ALBUTEROL SULFATE 90 UG/1
2 AEROSOL, METERED RESPIRATORY (INHALATION) EVERY 4 HOURS PRN
Qty: 18 G | Refills: 3 | Status: SHIPPED | OUTPATIENT
Start: 2024-05-17

## 2024-05-17 ASSESSMENT — PATIENT HEALTH QUESTIONNAIRE - PHQ9
2. FEELING DOWN, DEPRESSED OR HOPELESS: NOT AT ALL
1. LITTLE INTEREST OR PLEASURE IN DOING THINGS: NOT AT ALL
SUM OF ALL RESPONSES TO PHQ9 QUESTIONS 1 AND 2: 0

## 2024-05-17 NOTE — PROGRESS NOTES
Subjective   Patient ID: Bettina Kowalski is a pleasant 64 y.o. female who presents for Follow-up (Pt is here for c/o body aches.).  HPI  She has pain in the left cervical region for a month , radiating to the left shoulder   She has tried Tylenol, Excedrin and NSAID   She has hx of lumbar fusion in 2021  She now has back pain for the past 3 months.  Reports that the back pain is mainly localized around the right lumbosacral area.  She also has history of chronic hip pain and degenerative joint disease and he has been advised to have hip surgery.    She is concerned that her symptoms could be associated with UTI  She denies any dysuria, pyuria or hematuria.    She states she had a side effect to gabapentin  Medical management including medications, physical therapy, injections, referral to Ortho and PMR were discussed with patient.  She reports that physical therapy sessions were not affordable with her insurance coverage.  Due to her medical bills adding up she has been avoiding to see a specialist.  She is not interested in any localized injections at this time.  We discussed about a muscle relaxant which she is agreeable with.  She reports that a few years ago she was prescribed a muscle relaxant.  We discussed about starting baclofen and if her pain is not controlled with baclofen we can try diclofenac which she is agreeable with.  She has tried Flexeril and diclofenac in the past.  She does not recall any significant side effect to those medications.    Review of Systems   All other systems reviewed and are negative.      Visit Vitals  /70 (Patient Position: Sitting)   Pulse 77   Temp 36.1 °C (97 °F)   Resp 15          Objective   Physical Exam  Constitutional:       General: She is not in acute distress.     Appearance: Normal appearance.   HENT:      Head: Normocephalic and atraumatic.   Eyes:      General: No scleral icterus.     Conjunctiva/sclera: Conjunctivae normal.   Cardiovascular:      Rate and  Rhythm: Normal rate and regular rhythm.      Heart sounds: Normal heart sounds.   Pulmonary:      Effort: Pulmonary effort is normal.      Breath sounds: Normal breath sounds. No wheezing.   Abdominal:      General: Bowel sounds are normal. There is no distension.      Palpations: Abdomen is soft.      Tenderness: There is no abdominal tenderness.   Musculoskeletal:         General: Tenderness (pain around the left cervical region) present.      Cervical back: Neck supple.      Right lower leg: No edema.      Left lower leg: No edema.   Lymphadenopathy:      Cervical: No cervical adenopathy.   Skin:     General: Skin is warm and dry.   Neurological:      General: No focal deficit present.      Mental Status: She is alert and oriented to person, place, and time.   Psychiatric:         Mood and Affect: Mood normal.         Behavior: Behavior normal.         Assessment/Plan   Problem List Items Addressed This Visit       UTI symptoms - Primary    Relevant Orders    Urinalysis with Reflex Microscopic    Urine Culture    CBC and Auto Differential    Comprehensive Metabolic Panel    Asthma ([facility])    Relevant Medications    albuterol 90 mcg/actuation inhaler     Other Visit Diagnoses       Cervical pain (neck)        Relevant Medications    baclofen (Lioresal) 10 mg tablet

## 2024-05-18 LAB
APPEARANCE UR: CLEAR
BILIRUB UR STRIP.AUTO-MCNC: NEGATIVE MG/DL
COLOR UR: YELLOW
GLUCOSE UR STRIP.AUTO-MCNC: NORMAL MG/DL
KETONES UR STRIP.AUTO-MCNC: NEGATIVE MG/DL
LEUKOCYTE ESTERASE UR QL STRIP.AUTO: NEGATIVE
NITRITE UR QL STRIP.AUTO: NEGATIVE
PH UR STRIP.AUTO: 7.5 [PH]
PROT UR STRIP.AUTO-MCNC: NEGATIVE MG/DL
RBC # UR STRIP.AUTO: NEGATIVE /UL
SP GR UR STRIP.AUTO: 1.02
UROBILINOGEN UR STRIP.AUTO-MCNC: NORMAL MG/DL

## 2024-05-19 LAB — BACTERIA UR CULT: NORMAL

## 2024-05-20 ENCOUNTER — OFFICE VISIT (OUTPATIENT)
Dept: OPHTHALMOLOGY | Facility: CLINIC | Age: 64
End: 2024-05-20
Payer: COMMERCIAL

## 2024-05-20 DIAGNOSIS — H52.03 HYPEROPIA OF BOTH EYES: ICD-10-CM

## 2024-05-20 DIAGNOSIS — H25.13 AGE-RELATED NUCLEAR CATARACT OF BOTH EYES: Primary | ICD-10-CM

## 2024-05-20 PROCEDURE — 99214 OFFICE O/P EST MOD 30 MIN: CPT | Performed by: OPHTHALMOLOGY

## 2024-05-20 ASSESSMENT — CONF VISUAL FIELD
OS_INFERIOR_TEMPORAL_RESTRICTION: 0
OS_NORMAL: 1
OS_INFERIOR_NASAL_RESTRICTION: 0
OS_SUPERIOR_NASAL_RESTRICTION: 0
OD_NORMAL: 1
OS_SUPERIOR_TEMPORAL_RESTRICTION: 0
OD_SUPERIOR_TEMPORAL_RESTRICTION: 0
OD_INFERIOR_NASAL_RESTRICTION: 0
OD_SUPERIOR_NASAL_RESTRICTION: 0
OD_INFERIOR_TEMPORAL_RESTRICTION: 0

## 2024-05-20 ASSESSMENT — REFRACTION_MANIFEST
OD_AXIS: 167
OS_CYLINDER: +0.50
OD_SPHERE: +1.25
OS_SPHERE: +1.00
OS_AXIS: 177
OD_CYLINDER: +0.75
OD_ADD: +2.50
OS_ADD: +2.50

## 2024-05-20 ASSESSMENT — ENCOUNTER SYMPTOMS
ENDOCRINE NEGATIVE: 0
RESPIRATORY NEGATIVE: 0
GASTROINTESTINAL NEGATIVE: 0
CARDIOVASCULAR NEGATIVE: 0
ALLERGIC/IMMUNOLOGIC NEGATIVE: 0
PSYCHIATRIC NEGATIVE: 0
EYES NEGATIVE: 0
HEMATOLOGIC/LYMPHATIC NEGATIVE: 0
MUSCULOSKELETAL NEGATIVE: 0
NEUROLOGICAL NEGATIVE: 0
CONSTITUTIONAL NEGATIVE: 0

## 2024-05-20 ASSESSMENT — VISUAL ACUITY
OD_CC: 20/30
OD_CC+: -2
OS_CC: 20/25
OD_BAT_MED: 20/20
CORRECTION_TYPE: GLASSES
OS_BAT_MED: 20/20
METHOD: SNELLEN - LINEAR

## 2024-05-20 ASSESSMENT — REFRACTION_WEARINGRX
OD_AXIS: 160
OS_SPHERE: +1.00
OD_ADD: +2.50
OS_CYLINDER: +0.25
OD_CYLINDER: +0.50
OS_ADD: +2.50
OD_SPHERE: +1.25
OS_AXIS: 175

## 2024-05-20 ASSESSMENT — SLIT LAMP EXAM - LIDS
COMMENTS: GOOD POSITION
COMMENTS: GOOD POSITION

## 2024-05-20 ASSESSMENT — TONOMETRY
OS_IOP_MMHG: 15
OD_IOP_MMHG: 15
IOP_METHOD: GOLDMANN APPLANATION

## 2024-05-20 ASSESSMENT — CUP TO DISC RATIO
OS_RATIO: .3
OD_RATIO: .3

## 2024-05-20 ASSESSMENT — EXTERNAL EXAM - LEFT EYE: OS_EXAM: NORMAL

## 2024-05-20 ASSESSMENT — EXTERNAL EXAM - RIGHT EYE: OD_EXAM: NORMAL

## 2024-05-20 NOTE — PROGRESS NOTES
hyperopia/presbyopia, both eye  Mrx dispensed      cataracts, both eyes: not visually signficant. monitor     Rtc 12 months with magen Phelps

## 2024-05-29 ENCOUNTER — TELEPHONE (OUTPATIENT)
Dept: PRIMARY CARE | Facility: CLINIC | Age: 64
End: 2024-05-29
Payer: COMMERCIAL

## 2024-05-29 DIAGNOSIS — M54.2 CERVICAL PAIN (NECK): Primary | ICD-10-CM

## 2024-05-29 NOTE — TELEPHONE ENCOUNTER
Bettina states the Baclofen is making her dizzy and she would like to know if something else can be ordered.

## 2024-05-30 RX ORDER — CYCLOBENZAPRINE HCL 5 MG
5 TABLET ORAL NIGHTLY PRN
Qty: 30 TABLET | Refills: 0 | Status: SHIPPED | OUTPATIENT
Start: 2024-05-30

## 2024-10-31 ENCOUNTER — APPOINTMENT (OUTPATIENT)
Dept: PRIMARY CARE | Facility: CLINIC | Age: 64
End: 2024-10-31
Payer: COMMERCIAL

## 2024-10-31 ENCOUNTER — LAB (OUTPATIENT)
Dept: LAB | Facility: LAB | Age: 64
End: 2024-10-31
Payer: COMMERCIAL

## 2024-10-31 VITALS
BODY MASS INDEX: 31.32 KG/M2 | SYSTOLIC BLOOD PRESSURE: 120 MMHG | HEIGHT: 65 IN | WEIGHT: 188 LBS | DIASTOLIC BLOOD PRESSURE: 70 MMHG

## 2024-10-31 DIAGNOSIS — E04.2 MULTINODULAR GOITER: ICD-10-CM

## 2024-10-31 DIAGNOSIS — I10 BENIGN HYPERTENSION: Primary | ICD-10-CM

## 2024-10-31 DIAGNOSIS — E78.5 HYPERLIPIDEMIA, UNSPECIFIED HYPERLIPIDEMIA TYPE: ICD-10-CM

## 2024-10-31 DIAGNOSIS — Q07.00 ARNOLD-CHIARI SYNDROME WITHOUT SPINA BIFIDA OR HYDROCEPHALUS (MULTI): ICD-10-CM

## 2024-10-31 DIAGNOSIS — E55.9 VITAMIN D DEFICIENCY: ICD-10-CM

## 2024-10-31 DIAGNOSIS — M54.2 NECK PAIN: ICD-10-CM

## 2024-10-31 DIAGNOSIS — J45.909 ASTHMA, UNSPECIFIED ASTHMA SEVERITY, UNSPECIFIED WHETHER COMPLICATED, UNSPECIFIED WHETHER PERSISTENT (HHS-HCC): ICD-10-CM

## 2024-10-31 DIAGNOSIS — R07.9 CHEST PAIN, UNSPECIFIED TYPE: ICD-10-CM

## 2024-10-31 DIAGNOSIS — M05.9 SEROPOSITIVE RHEUMATOID ARTHRITIS: ICD-10-CM

## 2024-10-31 DIAGNOSIS — I10 BENIGN HYPERTENSION: ICD-10-CM

## 2024-10-31 DIAGNOSIS — R73.03 PREDIABETES: ICD-10-CM

## 2024-10-31 DIAGNOSIS — Z00.00 WELLNESS EXAMINATION: ICD-10-CM

## 2024-10-31 PROBLEM — E11.9 TYPE 2 DIABETES MELLITUS WITHOUT COMPLICATION, WITHOUT LONG-TERM CURRENT USE OF INSULIN (MULTI): Status: RESOLVED | Noted: 2023-12-12 | Resolved: 2024-10-31

## 2024-10-31 LAB
25(OH)D3 SERPL-MCNC: 28 NG/ML (ref 30–100)
ALBUMIN SERPL BCP-MCNC: 4.5 G/DL (ref 3.4–5)
ALP SERPL-CCNC: 102 U/L (ref 33–136)
ALT SERPL W P-5'-P-CCNC: 16 U/L (ref 7–45)
ANION GAP SERPL CALC-SCNC: 13 MMOL/L (ref 10–20)
AST SERPL W P-5'-P-CCNC: 19 U/L (ref 9–39)
BILIRUB SERPL-MCNC: 0.8 MG/DL (ref 0–1.2)
BUN SERPL-MCNC: 11 MG/DL (ref 6–23)
CALCIUM SERPL-MCNC: 10.5 MG/DL (ref 8.6–10.6)
CHLORIDE SERPL-SCNC: 105 MMOL/L (ref 98–107)
CHOLEST SERPL-MCNC: 231 MG/DL (ref 0–199)
CHOLESTEROL/HDL RATIO: 3.5
CO2 SERPL-SCNC: 30 MMOL/L (ref 21–32)
CREAT SERPL-MCNC: 0.77 MG/DL (ref 0.5–1.05)
EGFRCR SERPLBLD CKD-EPI 2021: 86 ML/MIN/1.73M*2
ERYTHROCYTE [DISTWIDTH] IN BLOOD BY AUTOMATED COUNT: 15.1 % (ref 11.5–14.5)
EST. AVERAGE GLUCOSE BLD GHB EST-MCNC: 117 MG/DL
GLUCOSE SERPL-MCNC: 109 MG/DL (ref 74–99)
HBA1C MFR BLD: 5.7 %
HCT VFR BLD AUTO: 40.9 % (ref 36–46)
HDLC SERPL-MCNC: 66.8 MG/DL
HGB BLD-MCNC: 12.7 G/DL (ref 12–16)
LDLC SERPL CALC-MCNC: 140 MG/DL
MCH RBC QN AUTO: 28.8 PG (ref 26–34)
MCHC RBC AUTO-ENTMCNC: 31.1 G/DL (ref 32–36)
MCV RBC AUTO: 93 FL (ref 80–100)
NON HDL CHOLESTEROL: 164 MG/DL (ref 0–149)
NRBC BLD-RTO: 0 /100 WBCS (ref 0–0)
PLATELET # BLD AUTO: 300 X10*3/UL (ref 150–450)
POTASSIUM SERPL-SCNC: 4.2 MMOL/L (ref 3.5–5.3)
PROT SERPL-MCNC: 7.7 G/DL (ref 6.4–8.2)
RBC # BLD AUTO: 4.41 X10*6/UL (ref 4–5.2)
SODIUM SERPL-SCNC: 144 MMOL/L (ref 136–145)
TRIGL SERPL-MCNC: 123 MG/DL (ref 0–149)
TSH SERPL-ACNC: 0.65 MIU/L (ref 0.44–3.98)
VLDL: 25 MG/DL (ref 0–40)
WBC # BLD AUTO: 6.2 X10*3/UL (ref 4.4–11.3)

## 2024-10-31 PROCEDURE — 1124F ACP DISCUSS-NO DSCNMKR DOCD: CPT | Performed by: STUDENT IN AN ORGANIZED HEALTH CARE EDUCATION/TRAINING PROGRAM

## 2024-10-31 PROCEDURE — 1036F TOBACCO NON-USER: CPT | Performed by: STUDENT IN AN ORGANIZED HEALTH CARE EDUCATION/TRAINING PROGRAM

## 2024-10-31 PROCEDURE — 3008F BODY MASS INDEX DOCD: CPT | Performed by: STUDENT IN AN ORGANIZED HEALTH CARE EDUCATION/TRAINING PROGRAM

## 2024-10-31 PROCEDURE — 1159F MED LIST DOCD IN RCRD: CPT | Performed by: STUDENT IN AN ORGANIZED HEALTH CARE EDUCATION/TRAINING PROGRAM

## 2024-10-31 PROCEDURE — 84443 ASSAY THYROID STIM HORMONE: CPT

## 2024-10-31 PROCEDURE — 80061 LIPID PANEL: CPT

## 2024-10-31 PROCEDURE — 80053 COMPREHEN METABOLIC PANEL: CPT

## 2024-10-31 PROCEDURE — 85027 COMPLETE CBC AUTOMATED: CPT

## 2024-10-31 PROCEDURE — 83036 HEMOGLOBIN GLYCOSYLATED A1C: CPT

## 2024-10-31 PROCEDURE — 3074F SYST BP LT 130 MM HG: CPT | Performed by: STUDENT IN AN ORGANIZED HEALTH CARE EDUCATION/TRAINING PROGRAM

## 2024-10-31 PROCEDURE — 82306 VITAMIN D 25 HYDROXY: CPT

## 2024-10-31 PROCEDURE — 99397 PER PM REEVAL EST PAT 65+ YR: CPT | Performed by: STUDENT IN AN ORGANIZED HEALTH CARE EDUCATION/TRAINING PROGRAM

## 2024-10-31 PROCEDURE — 36415 COLL VENOUS BLD VENIPUNCTURE: CPT

## 2024-10-31 PROCEDURE — 3078F DIAST BP <80 MM HG: CPT | Performed by: STUDENT IN AN ORGANIZED HEALTH CARE EDUCATION/TRAINING PROGRAM

## 2024-10-31 RX ORDER — PREDNISONE 20 MG/1
40 TABLET ORAL DAILY
Qty: 10 TABLET | Refills: 0 | Status: SHIPPED | OUTPATIENT
Start: 2024-10-31 | End: 2024-11-05

## 2024-10-31 RX ORDER — LEVOTHYROXINE SODIUM 112 UG/1
TABLET ORAL
COMMUNITY
Start: 2024-05-24 | End: 2024-10-31 | Stop reason: ALTCHOICE

## 2024-10-31 RX ORDER — NAPROXEN SODIUM 220 MG/1
81 TABLET, FILM COATED ORAL DAILY
Qty: 30 TABLET | Refills: 11 | Status: SHIPPED | OUTPATIENT
Start: 2024-10-31 | End: 2025-10-31

## 2024-10-31 RX ORDER — METFORMIN HYDROCHLORIDE 500 MG/1
500 TABLET ORAL
Qty: 180 TABLET | Refills: 1 | Status: SHIPPED | OUTPATIENT
Start: 2024-10-31

## 2024-10-31 RX ORDER — ALBUTEROL SULFATE 90 UG/1
2 INHALANT RESPIRATORY (INHALATION) EVERY 4 HOURS PRN
Qty: 18 G | Refills: 3 | Status: SHIPPED | OUTPATIENT
Start: 2024-10-31

## 2024-10-31 ASSESSMENT — COLUMBIA-SUICIDE SEVERITY RATING SCALE - C-SSRS
1. IN THE PAST MONTH, HAVE YOU WISHED YOU WERE DEAD OR WISHED YOU COULD GO TO SLEEP AND NOT WAKE UP?: NO
2. HAVE YOU ACTUALLY HAD ANY THOUGHTS OF KILLING YOURSELF?: NO
6. HAVE YOU EVER DONE ANYTHING, STARTED TO DO ANYTHING, OR PREPARED TO DO ANYTHING TO END YOUR LIFE?: NO

## 2024-10-31 ASSESSMENT — ENCOUNTER SYMPTOMS
DEPRESSION: 0
OCCASIONAL FEELINGS OF UNSTEADINESS: 0
LOSS OF SENSATION IN FEET: 0

## 2024-10-31 ASSESSMENT — PATIENT HEALTH QUESTIONNAIRE - PHQ9
SUM OF ALL RESPONSES TO PHQ9 QUESTIONS 1 AND 2: 0
1. LITTLE INTEREST OR PLEASURE IN DOING THINGS: NOT AT ALL
2. FEELING DOWN, DEPRESSED OR HOPELESS: NOT AT ALL

## 2024-11-01 RX ORDER — ROSUVASTATIN CALCIUM 20 MG/1
20 TABLET, COATED ORAL DAILY
Qty: 30 TABLET | Refills: 2 | Status: SHIPPED | OUTPATIENT
Start: 2024-11-01 | End: 2025-01-30

## 2024-11-18 ENCOUNTER — HOSPITAL ENCOUNTER (OUTPATIENT)
Dept: RADIOLOGY | Facility: CLINIC | Age: 64
Discharge: HOME | End: 2024-11-18
Payer: COMMERCIAL

## 2024-11-18 ENCOUNTER — APPOINTMENT (OUTPATIENT)
Dept: ORTHOPEDIC SURGERY | Facility: CLINIC | Age: 64
End: 2024-11-18
Payer: COMMERCIAL

## 2024-11-18 ENCOUNTER — LAB (OUTPATIENT)
Dept: LAB | Facility: LAB | Age: 64
End: 2024-11-18
Payer: COMMERCIAL

## 2024-11-18 ENCOUNTER — OFFICE VISIT (OUTPATIENT)
Dept: ORTHOPEDIC SURGERY | Facility: CLINIC | Age: 64
End: 2024-11-18
Payer: COMMERCIAL

## 2024-11-18 DIAGNOSIS — M16.11 ARTHRITIS OF RIGHT HIP: ICD-10-CM

## 2024-11-18 DIAGNOSIS — Z96.652 HISTORY OF TOTAL KNEE ARTHROPLASTY, LEFT: Primary | ICD-10-CM

## 2024-11-18 DIAGNOSIS — Z96.652 HISTORY OF TOTAL KNEE ARTHROPLASTY, LEFT: ICD-10-CM

## 2024-11-18 LAB
ALBUMIN SERPL BCP-MCNC: 4.3 G/DL (ref 3.4–5)
HCT VFR BLD AUTO: 38.3 % (ref 36–46)
HGB BLD-MCNC: 11.9 G/DL (ref 12–16)

## 2024-11-18 PROCEDURE — 85018 HEMOGLOBIN: CPT

## 2024-11-18 PROCEDURE — 85014 HEMATOCRIT: CPT

## 2024-11-18 PROCEDURE — 73502 X-RAY EXAM HIP UNI 2-3 VIEWS: CPT | Mod: RT

## 2024-11-18 PROCEDURE — 82040 ASSAY OF SERUM ALBUMIN: CPT

## 2024-11-18 PROCEDURE — 73562 X-RAY EXAM OF KNEE 3: CPT | Mod: LEFT SIDE | Performed by: RADIOLOGY

## 2024-11-18 PROCEDURE — 36415 COLL VENOUS BLD VENIPUNCTURE: CPT

## 2024-11-18 PROCEDURE — 99417 PROLNG OP E/M EACH 15 MIN: CPT | Performed by: STUDENT IN AN ORGANIZED HEALTH CARE EDUCATION/TRAINING PROGRAM

## 2024-11-18 PROCEDURE — 99215 OFFICE O/P EST HI 40 MIN: CPT | Performed by: STUDENT IN AN ORGANIZED HEALTH CARE EDUCATION/TRAINING PROGRAM

## 2024-11-18 PROCEDURE — 73562 X-RAY EXAM OF KNEE 3: CPT | Mod: LT

## 2024-11-18 PROCEDURE — 73502 X-RAY EXAM HIP UNI 2-3 VIEWS: CPT | Mod: RIGHT SIDE | Performed by: RADIOLOGY

## 2024-11-18 NOTE — PROGRESS NOTES
BARB/TKA Related Summary           L hip: N  L knee  3/30/2024: Primary TKA (Dr. Myles at )  R hip: N  R knee: N  Lumbar surgery or significant pathology  11/8/2021: L3-5 lami/fusion, L4-5 TLIF (Dr. Ayala at  [facility]). Doing well.            CC/SUBJECTIVE/HPI            PCP: Christopher D'Amico, DO  Referring provider: Dr. Memo Myles  Bettina Kowalski is a 64 y.o. female presenting for right hip pain.  She has had this pain for many years and has been following with my colleague, Dr. Myles.  She feels she is ready to proceed with total hip replacement, and Dr. Myles is retiring soon so kindly referred her to me for care.  She has tried a variety of conservative measures listed below.  Unfortunately, these are no longer providing relief. At this point, the pain is limiting activities of daily living and hobbies.    R hip  Symptoms  Pain: 8/10  Onset: chronic/gradual  Duration: 1-2yrs  Location: groin  Quality: sharp/stab  Limitations: morning stiffness, pain after activity, limp, feeling unstable, night pain, difficulty with stairs, and difficulty in/out of chair/car  Ambulation limit due to pain: <100ft  Other symptoms: none  Treatment  Tried: activity modification, PT, home exercises, ice/heat, topical gel (ie Voltaren), OTCs, and corticosteroid injection(s)  Most recent injection <3mo ago? N (3/2024)  Assistive device: cane  Treatment attempted for >2yrs and is no longer effective            HISTORIES (System Generated and Pt Reported on Form Today)       Dental  Pt reported: No active issues     PMH  Pt reported: Denies heart/lung/kidney/liver issues, DM, stroke, seizure, bariatric surgery, anticoag, MRSA, cancer, personal/familial coagulopathies except: none in addition to below  System generated (PMH, problem list both included since EMR change caused discrepancies):   Past Medical History:   Diagnosis Date    Acute vaginitis 07/29/2020    Bacterial vaginosis    Bursitis of unspecified shoulder  07/11/2014    Subacromial bursitis    Encounter for other screening for malignant neoplasm of breast 12/21/2018    Encounter for screening for malignant neoplasm of breast    Encounter for screening for other viral diseases 12/21/2018    Encounter for screening for other viral diseases    Immunization not carried out because of patient refusal 12/21/2018    Pneumococcal vaccination declined    Other conditions influencing health status 03/17/2016    History of cough    Other injury of unspecified body region, initial encounter 10/09/2014    Bruising    Pain in left knee 08/01/2018    Knee pain, left    Pain in unspecified knee 08/27/2015    Joint pain, knee    Personal history of other diseases of the musculoskeletal system and connective tissue     History of arthritis    Personal history of other diseases of the musculoskeletal system and connective tissue 04/28/2014    History of bursitis    Personal history of other diseases of the nervous system and sense organs 06/17/2015    History of ear pain    Personal history of other diseases of the respiratory system     History of asthma    Personal history of other diseases of the respiratory system 06/17/2015    History of allergic rhinitis    Personal history of other diseases of the respiratory system 03/17/2016    History of upper respiratory infection    Personal history of other diseases of the respiratory system 07/11/2014    History of acute pharyngitis    Personal history of other specified conditions 03/17/2016    History of shortness of breath    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    Prolonged grief reaction    Unilateral primary osteoarthritis, unspecified knee 11/05/2015    Osteoarthrosis, localized, primary, knee      Patient Active Problem List   Diagnosis    Cerebral herniation (Multi)    History of Chiari malformation    B12 deficiency    Benign hypertension    HTN, goal below 130/80    Change in vision    Chest pain    Chronic insomnia    Decreased  range of motion of left knee    Delayed sleep phase syndrome    Depression    Fatigue    Female pelvic pain    Grief    Hyperlipidemia    Hyperopia    Left knee pain    Leg pain    Leg swelling    Mild major depression (CMS-HCC)    Mixed incontinence, urge and stress (male) (female)    Multinodular goiter    Osteoarthritis of knee    Pain of right hip joint    Pelvic floor weakness    Polyarthralgia    Prediabetes    Primary localized osteoarthrosis of left lower leg    Atrophic vaginitis    Right lumbar radiculopathy    Spinal stenosis of lumbar region with neurogenic claudication    Synovial cyst of lumbar spine    S/P left unicompartmental knee replacement    S/P lumbar spinal fusion    Sciatica of right side    Seropositive rheumatoid arthritis    Short of breath on exertion    Snoring    Spondylolisthesis of lumbar region    Urge incontinence of urine    Urinary frequency    Urinary hesitancy    UTI symptoms    Xerosis cutis    Asthma    Arthritis of right hip    Arthritis of knee, left    Anemia    Allergic rhinitis    Age-related nuclear cataract of both eyes    Adenomatous colon polyp    Acute sinusitis    Arnold-Chiari syndrome without spina bifida or hydrocephalus (Multi)    Generalized osteoarthritis of multiple sites    Mass of breast    Nuclear senile cataract    Obesity    Prolonged grief disorder     PSH  Pt reported: Per above.   System generated:   Past Surgical History:   Procedure Laterality Date    OTHER SURGICAL HISTORY  03/28/2014    Skull Excision Subfascial Soft Tissue Tumor    OTHER SURGICAL HISTORY  12/31/2018    Laparoscopic hysterectomy    OTHER SURGICAL HISTORY  12/31/2018    Colonoscopy    OTHER SURGICAL HISTORY  10/13/2022    Transobturator sling placement     Family Hx  Pt reported clot/coagulopathies: none    Social Hx  Pt reported substance use: N  System generated:   Social History     Tobacco Use    Smoking status: Never    Smokeless tobacco: Never   Substance Use Topics     "Alcohol use: Never    Drug use: Never     Allergies  Pt reported (meds, metals): per below  System generated:   Allergies   Allergen Reactions    Gabapentin Other    Metronidazole Hives     Current Meds  System generated:   Current Outpatient Medications:     albuterol 90 mcg/actuation inhaler, Inhale 2 puffs every 4 hours if needed for wheezing., Disp: 18 g, Rfl: 3    aspirin 81 mg chewable tablet, Chew 1 tablet (81 mg) once daily., Disp: 30 tablet, Rfl: 11    BD Ultra-Fine Mini Pen Needle 31 gauge x 3/16\" needle, , Disp: , Rfl:     metFORMIN (Glucophage) 500 mg tablet, Take 1 tablet (500 mg) by mouth 2 times daily (morning and late afternoon)., Disp: 180 tablet, Rfl: 1    NON FORMULARY, Disability parking placard needed for permanent lifetime orthopaedic disability., Disp: , Rfl:     rosuvastatin (Crestor) 20 mg tablet, Take 1 tablet (20 mg) by mouth once daily., Disp: 30 tablet, Rfl: 2    ROS: Neg except HPI            OBJECTIVE            Physical exam  Estimated body mass index is 31.28 kg/m² as calculated from the following:    Height as of 10/31/24: 1.651 m (5' 5\").    Weight as of 10/31/24: 85.3 kg (188 lb).  Gen/psych: NAD, conversational, appropriate    Ambulation  Gait: antalgic  Assistive device: cane  Spine  Lumbar tenderness: neg  Limited ROM: neg, with no radiation or pain with flex/ex, lateral bending  SLR test: neg    Focused MSK exam: L  TKA  Thrust: neg  Skin/Incision: well healed.   Effusion: trace  Alignment: neutral  Alignment correctable: na  Knee tenderness: none  Extension: passive flexion contracture 0-5° and active extension lag 0°  Flexion: 115º  Flexion stability: stable  Varus/Valgus stability: stable  Referred pain to knee with hip 90° flexion and 45° ER/IR: neg  Neurovascular    Strength: 5/5 hip/knee/ankle flexion and extension  Sensory (L2-S1): SILT throughout lower extremity  Edema/stasis: no pitting edema  Pulse: DP 1+, PT 1+    Focused MSK exam: R  Hip  Trendelenberg test: Unable " to tolerate due to pain  Skin/incision: normal in area of planned/possible incision (DA approach)   Tenderness: proximal thigh and groin  Pain with log roll: pos  Stinchfield: pos  ROM: limited, painful  Flexion: 90º  IR: <10º  ER: 30º  Abd: 20º  Neurovascular    Strength: 5/5 hip/knee/ankle flexion and extension  Sensory (L2-S1): SILT throughout lower extremity  Edema/stasis: no pitting edema  Pulse: DP 1+, PT 1+    Leg lengths equal    Imaging  11/18/2024 L knee radiographs ([facility], [facility] AP/lateral) on my read: TKA components in acceptable position with no sign of gross implant/hardware failure.   11/18/2024 R hip radiographs ([facility] Pelvis, AP/Lateral) on my read: Joint space narrowing (severe) with osteophytes, sclerosis, and mild protrusio.          ASSESSMENT & PLAN           Patient verbalized understanding of below A&P. All questions answered.  L Primary TKA (Dr. Myles, 3/30/2024)  Activity/Therapy/Incision: Continue low impact exercise and weight mgmt.  Follow-up: 5yrs with XR (2029)  R hip DJD  Differential includes radicular pain from spine. H&P most consistent with hip origin  General information  Evaluation, imaging, diagnosis, and treatment options (conservative and surgical as well as risks, benefits, recovery, and outcomes) discussed. Conservative options should be maximized and include activity modification, bracing, weight loss, PT, home exercise, local pain control (ice, heat, topicals), OTCs, and assistive devices. Once exhausted, injections may provide relief. If these fail to improve pain, function, and quality of life, elective arthroplasty may be an option.  Shared decision making   Patient has failed exhaustive conservative treatments and elected for R primary BARB. Given the extensive conservative treatments tried, continued pain, and effect on quality of life, I agree this is a reasonable decision.  Radiographs notable for axial wear pattern with mild protrusio consistent with her history of  RA  Follow-up: 2wk prepo appt  PMH, PSH, other considerations discussed  Chiari malformation  Seropositive RA not on antirheumatic medications, follows with rheumatology  DM and elective arthroplasty req A1c <7.5 (5.7 on 10/31/2024)  L3-5 fusion but good lumbar mobility on exam today, DM likely not necessary  BMI<40 but on spectrum of increased risk of surgical complications.  SDD candidate  Listed in EMR but not endorsed by pt or evident on exam today: Leg swelling, R lumbar radiculopathy and neurogenic claudication resolved with previous spine surgery, L UKA (was TKA)  Referred by Dr. Myles.  She met with my existing patient, Love Augustine, to talk about her experience with BARB  Occupation: Mortician (retiring soon)  Hobbies: Travel    Time: I spent 60min preparing to see pt, obtaining/reviewing separately obtained hx, performing necessary/appropriate PE/eval, independently interpreting results and communicating them with counseling/education to pt/fam/caregiver, placing orders, communicating/coordinating care with other providers, and documenting in EMR.

## 2024-11-29 ENCOUNTER — HOSPITAL ENCOUNTER (OUTPATIENT)
Facility: HOSPITAL | Age: 64
Setting detail: OUTPATIENT SURGERY
End: 2024-11-29
Attending: STUDENT IN AN ORGANIZED HEALTH CARE EDUCATION/TRAINING PROGRAM | Admitting: STUDENT IN AN ORGANIZED HEALTH CARE EDUCATION/TRAINING PROGRAM
Payer: COMMERCIAL

## 2024-11-29 PROBLEM — M16.11 PRIMARY OSTEOARTHRITIS OF RIGHT HIP: Status: ACTIVE | Noted: 2025-01-07

## 2024-12-14 ENCOUNTER — HOSPITAL ENCOUNTER (EMERGENCY)
Facility: HOSPITAL | Age: 64
Discharge: HOME | End: 2024-12-14
Attending: STUDENT IN AN ORGANIZED HEALTH CARE EDUCATION/TRAINING PROGRAM
Payer: COMMERCIAL

## 2024-12-14 VITALS
BODY MASS INDEX: 31.76 KG/M2 | TEMPERATURE: 96.4 F | RESPIRATION RATE: 18 BRPM | SYSTOLIC BLOOD PRESSURE: 160 MMHG | OXYGEN SATURATION: 98 % | HEIGHT: 64 IN | HEART RATE: 81 BPM | DIASTOLIC BLOOD PRESSURE: 84 MMHG | WEIGHT: 186 LBS

## 2024-12-14 DIAGNOSIS — G89.29 CHRONIC RIGHT-SIDED LOW BACK PAIN WITH SCIATICA, SCIATICA LATERALITY UNSPECIFIED: Primary | ICD-10-CM

## 2024-12-14 DIAGNOSIS — M54.40 CHRONIC RIGHT-SIDED LOW BACK PAIN WITH SCIATICA, SCIATICA LATERALITY UNSPECIFIED: Primary | ICD-10-CM

## 2024-12-14 DIAGNOSIS — M25.551 RIGHT HIP PAIN: ICD-10-CM

## 2024-12-14 PROCEDURE — 2500000005 HC RX 250 GENERAL PHARMACY W/O HCPCS: Performed by: STUDENT IN AN ORGANIZED HEALTH CARE EDUCATION/TRAINING PROGRAM

## 2024-12-14 PROCEDURE — 99283 EMERGENCY DEPT VISIT LOW MDM: CPT | Performed by: STUDENT IN AN ORGANIZED HEALTH CARE EDUCATION/TRAINING PROGRAM

## 2024-12-14 PROCEDURE — 2500000001 HC RX 250 WO HCPCS SELF ADMINISTERED DRUGS (ALT 637 FOR MEDICARE OP): Performed by: STUDENT IN AN ORGANIZED HEALTH CARE EDUCATION/TRAINING PROGRAM

## 2024-12-14 RX ORDER — METHOCARBAMOL 500 MG/1
500 TABLET, FILM COATED ORAL 3 TIMES DAILY PRN
Qty: 20 TABLET | Refills: 0 | Status: SHIPPED | OUTPATIENT
Start: 2024-12-14 | End: 2024-12-14

## 2024-12-14 RX ORDER — OXYCODONE HYDROCHLORIDE 5 MG/1
5 TABLET ORAL ONCE
Status: COMPLETED | OUTPATIENT
Start: 2024-12-14 | End: 2024-12-14

## 2024-12-14 RX ORDER — LIDOCAINE 560 MG/1
1 PATCH PERCUTANEOUS; TOPICAL; TRANSDERMAL ONCE
Status: DISCONTINUED | OUTPATIENT
Start: 2024-12-14 | End: 2024-12-14 | Stop reason: HOSPADM

## 2024-12-14 RX ORDER — METHOCARBAMOL 500 MG/1
750 TABLET, FILM COATED ORAL ONCE
Status: COMPLETED | OUTPATIENT
Start: 2024-12-14 | End: 2024-12-14

## 2024-12-14 RX ORDER — METHOCARBAMOL 500 MG/1
500 TABLET, FILM COATED ORAL 3 TIMES DAILY PRN
Qty: 20 TABLET | Refills: 0 | Status: SHIPPED | OUTPATIENT
Start: 2024-12-14 | End: 2024-12-27

## 2024-12-14 RX ADMIN — OXYCODONE HYDROCHLORIDE 5 MG: 5 TABLET ORAL at 10:38

## 2024-12-14 RX ADMIN — LIDOCAINE 4% 1 PATCH: 40 PATCH TOPICAL at 10:28

## 2024-12-14 RX ADMIN — METHOCARBAMOL TABLETS 750 MG: 500 TABLET, COATED ORAL at 10:39

## 2024-12-14 ASSESSMENT — PAIN DESCRIPTION - ONSET: ONSET: GRADUAL

## 2024-12-14 ASSESSMENT — PAIN DESCRIPTION - LOCATION: LOCATION: BACK

## 2024-12-14 ASSESSMENT — PAIN DESCRIPTION - DESCRIPTORS
DESCRIPTORS: SHOOTING
DESCRIPTORS: SHOOTING

## 2024-12-14 ASSESSMENT — PAIN DESCRIPTION - FREQUENCY: FREQUENCY: CONSTANT/CONTINUOUS

## 2024-12-14 ASSESSMENT — PAIN DESCRIPTION - ORIENTATION: ORIENTATION: LOWER

## 2024-12-14 ASSESSMENT — PAIN DESCRIPTION - PROGRESSION: CLINICAL_PROGRESSION: GRADUALLY WORSENING

## 2024-12-14 ASSESSMENT — PAIN SCALES - GENERAL: PAINLEVEL_OUTOF10: 8

## 2024-12-14 ASSESSMENT — PAIN - FUNCTIONAL ASSESSMENT: PAIN_FUNCTIONAL_ASSESSMENT: 0-10

## 2024-12-14 ASSESSMENT — PAIN DESCRIPTION - PAIN TYPE: TYPE: ACUTE PAIN

## 2024-12-14 NOTE — DISCHARGE INSTRUCTIONS
You are seen in the emerged department for right hip and right back pain.  We discussed an option to come in for pain control, although our options are limited while inpatient.  We also discussed possible oxycodone before you go home but you declined which is appropriate and we want to respect your wishes.  We will plan to send you home with a prescription for Robaxin, this is a muscle relaxer, please take this at night and do not drive long distances or operate heavy machinery with this medication.  If you develop a fever, numbness or tingling in your genital region, if you pee or poop yourself without realizing it or have increased leg weakness please return to the emergency department.  Otherwise, please follow-up with your orthopedic doctor.

## 2024-12-14 NOTE — ED PROVIDER NOTES
[facility] ED Note    Date of Service: 12/14/2024  Reason for Visit: Back Pain and Leg Pain      Patient History     HPI  Bettina Kowalski is a 64 y.o. female with past medical history of HTN, spinal stenosis (s/p lumbar fusion), left knee arthroplasty, depression, and HLD who presents the emergency department with right buttock and right hip pain.  Patient has had a chronic right buttock and right hip pain for 2 years.  Patient has been seeing a the sports medicine doctor, an orthopedist, and does have plans for a right hip arthroplasty in a few months.  Patient also sees sports medicine for hip injections.  She reports that over the past 2 weeks she has had increase in severity of her pain without any acute inciting event.  No new trauma.  Patient reports that the pain is located in her posterior hip right near her buttocks, radiating to her groin and down her right leg anteriorly.  This is not consistent with the pain that she has been spearing seeing for the past 2 years.  No bowel/bladder incontinence, no saddle anesthesia, no leg weakness.  Patient states that given the degree of pain she presented the ED for further evaluation.  She denies any fevers, nausea or vomiting or rash.      Past Medical History:   Diagnosis Date    Acute vaginitis 07/29/2020    Bacterial vaginosis    Bursitis of unspecified shoulder 07/11/2014    Subacromial bursitis    Encounter for other screening for malignant neoplasm of breast 12/21/2018    Encounter for screening for malignant neoplasm of breast    Encounter for screening for other viral diseases 12/21/2018    Encounter for screening for other viral diseases    Immunization not carried out because of patient refusal 12/21/2018    Pneumococcal vaccination declined    Other conditions influencing health status 03/17/2016    History of cough    Other injury of unspecified body region, initial encounter 10/09/2014    Bruising    Pain in left knee 08/01/2018    Knee pain,  "left    Pain in unspecified knee 08/27/2015    Joint pain, knee    Personal history of other diseases of the musculoskeletal system and connective tissue     History of arthritis    Personal history of other diseases of the musculoskeletal system and connective tissue 04/28/2014    History of bursitis    Personal history of other diseases of the nervous system and sense organs 06/17/2015    History of ear pain    Personal history of other diseases of the respiratory system     History of asthma    Personal history of other diseases of the respiratory system 06/17/2015    History of allergic rhinitis    Personal history of other diseases of the respiratory system 03/17/2016    History of upper respiratory infection    Personal history of other diseases of the respiratory system 07/11/2014    History of acute pharyngitis    Personal history of other specified conditions 03/17/2016    History of shortness of breath    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    Prolonged grief reaction    Unilateral primary osteoarthritis, unspecified knee 11/05/2015    Osteoarthrosis, localized, primary, knee     Past Surgical History:   Procedure Laterality Date    OTHER SURGICAL HISTORY  03/28/2014    Skull Excision Subfascial Soft Tissue Tumor    OTHER SURGICAL HISTORY  12/31/2018    Laparoscopic hysterectomy    OTHER SURGICAL HISTORY  12/31/2018    Colonoscopy    OTHER SURGICAL HISTORY  10/13/2022    Transobturator sling placement         Physical Exam     Vitals:    12/14/24 0924   BP: 128/81   BP Location: Right arm   Patient Position: Sitting   Pulse: 80   Resp: 18   Temp: 35.8 °C (96.4 °F)   TempSrc: Tympanic   SpO2: 100%   Weight: 84.4 kg (186 lb)   Height: 1.626 m (5' 4\")     General: Age-appropriate female, nontoxic, sitting comfortably in the rney no acute distress.  Pulmonary:   Non-labored breathing. Breath sounds clear bilaterally  Cardiac:  Regular rate   Abdomen:  Soft. Non-tender. Non-distended  Musculoskeletal: No long " bone deformity.  Tenderness to palpation in the right posterior PSIS.  5/5 strength in the right hip flexion as well as knee flexion/extension.  2+ right DP/PT pulse.  Sensation intact light touch.  Positive right straight leg test.  Skin:  Dry, no rashes  Neuro: Alert and orient x 4.  Moves all 4 extremity spontaneously.    Diagnostic Studies     Labs:  Please see EMR for labs obtained during this patient encounter.    Radiology:  Please see EMR for imaging obtained during this patient encounter.    ED Course and MDM     Bettina Kowalski is a 64 y.o. female with a history and presentation as described above in HPI.      Upon presentation, the patient was afebrile, well-appearing, with unremarkable vital signs.  Patient with a history of chronic right hip pain and arthritis as well as right sciatica presents to the emergency department with right hip pain and sciatica.  Differential diagnosis includes possible cauda equina syndrome, possible musculoskeletal injury, possible fracture, or possible arthritis.  Given absence of any new trauma, low suspicion for any musculoskeletal injury or any new fracture.  No further imaging was obtained she recently had x-rays outpatient.  Patient did not have any red flag symptoms to suggest cauda equina syndrome or acute compressive pathology.  Patient was given Robaxin, oxycodone, and a lidocaine patch with some improvement on reassessment.  I did discuss with the patient the risks and benefits of possibly being admitted to the hospital, although that may entail discussions of a skilled nursing facility.  Given that patient does have a appointment for a right hip arthroplasty on 1/7/2025, she opted for discharge.  She will be given a course of Robaxin, was given return precautions and subsequent discharge.    Impression     Diagnoses as of 12/14/24 1606   Chronic right-sided low back pain with sciatica, sciatica laterality unspecified   Right hip pain        Plan       Discharge,  see DCI provided      Shawn Hernandez MD  [facility] Emergency Medicine         Shawn Hernandez MD  12/14/24 4554

## 2024-12-14 NOTE — ED TRIAGE NOTES
Pt having pain from lower back down through right leg. Scheduled for hip replacement surgery on Jan 7. Numbness down right leg. Swelling and redness in right thigh. No hx blood clots, denies cp sob.

## 2024-12-16 ENCOUNTER — APPOINTMENT (OUTPATIENT)
Dept: ORTHOPEDIC SURGERY | Facility: CLINIC | Age: 64
End: 2024-12-16
Payer: COMMERCIAL

## 2024-12-16 ENCOUNTER — APPOINTMENT (OUTPATIENT)
Dept: PRIMARY CARE | Facility: CLINIC | Age: 64
End: 2024-12-16
Payer: COMMERCIAL

## 2024-12-18 ENCOUNTER — CLINICAL SUPPORT (OUTPATIENT)
Dept: PREADMISSION TESTING | Facility: HOSPITAL | Age: 64
End: 2024-12-18
Payer: COMMERCIAL

## 2024-12-18 DIAGNOSIS — M16.11 PRIMARY OSTEOARTHRITIS OF RIGHT HIP: ICD-10-CM

## 2024-12-18 RX ORDER — SALIVA STIMULANT COMB. NO.4
1 SPRAY, NON-AEROSOL (ML) MUCOUS MEMBRANE DAILY
COMMUNITY

## 2024-12-18 RX ORDER — BISMUTH SUBSALICYLATE 262 MG
1 TABLET,CHEWABLE ORAL DAILY
COMMUNITY

## 2024-12-18 RX ORDER — NAPROXEN 375 MG/1
375 TABLET ORAL
COMMUNITY

## 2024-12-18 RX ORDER — ACETAMINOPHEN 325 MG/1
650 TABLET ORAL EVERY 8 HOURS PRN
COMMUNITY

## 2024-12-18 RX ORDER — FLUTICASONE PROPIONATE 50 MCG
1 SPRAY, SUSPENSION (ML) NASAL AS NEEDED
COMMUNITY

## 2024-12-18 NOTE — CPM/PAT NURSE NOTE
"CPM/PAT Nurse Note      Name: Bettina CASANOVA Dex (Bettina CASANOVA Dex)  /Age: 1960/64 y.o.       Past Medical History:   Diagnosis Date    Anemia     Asthma     B12 deficiency     Cataract, bilateral     Cerebral herniation (Multi)     Chronic insomnia     Depression     History of blood transfusion     History of Chiari malformation     History of prediabetes     Hyperlipidemia     Hypertension     Incontinence     Multinodular goiter     Obesity     Pelvic floor weakness     Prediabetes     Primary osteoarthritis of right hip     Plan: Right Hip Total Arthroplasty Anterior Approach 25    Right lumbar radiculopathy     Sciatica of right side     Seropositive rheumatoid arthritis        Past Surgical History:   Procedure Laterality Date    COLONOSCOPY      HYSTERECTOMY  2009    LUMBAR FUSION      OTHER SURGICAL HISTORY      Skull Excision Subfascial Soft Tissue Tumor    TOTAL KNEE ARTHROPLASTY Left     TRANSOBTURATOR SLING         Patient Sexual activity questions deferred to the physician.    Family History   Problem Relation Name Age of Onset    Stroke Mother      Breast cancer Mother's Sister  74       Allergies   Allergen Reactions    Gabapentin Hallucinations    Metronidazole Hives       Prior to Admission medications    Medication Sig Start Date End Date Taking? Authorizing Provider   acetaminophen (Tylenol) 325 mg tablet Take 2 tablets (650 mg) by mouth every 8 hours if needed for mild pain (1 - 3).    Historical Provider, MD   albuterol 90 mcg/actuation inhaler Inhale 2 puffs every 4 hours if needed for wheezing. 10/31/24   Christopher D'Amico, DO   aspirin 81 mg chewable tablet Chew 1 tablet (81 mg) once daily. 10/31/24 10/31/25  Christopher D'Amico, DO   BD Ultra-Fine Mini Pen Needle 31 gauge x 3/16\" needle  23   Historical Provider, MD   cranberry extract 500 mg capsule Take 1 capsule by mouth once daily.    Historical Provider, MD   DIETARY SUPPLEMENT ORAL Take 1 Dose by mouth 2 times a " day. Sciatica support    Historical Provider, MD   docosahexaenoic acid/epa (FISH OIL ORAL) Take 1 Dose by mouth once daily.    Historical Provider, MD   fluticasone (Flonase) 50 mcg/actuation nasal spray Administer 1 spray into each nostril if needed for rhinitis or allergies. Shake gently. Before first use, prime pump. After use, clean tip and replace cap.    Historical Provider, MD   metFORMIN (Glucophage) 500 mg tablet Take 1 tablet (500 mg) by mouth 2 times daily (morning and late afternoon). 10/31/24   Christopher D'Amico, DO   methocarbamol (Robaxin) 500 mg tablet Take 1 tablet (500 mg) by mouth 3 times a day as needed for muscle spasms for up to 10 days. 12/14/24 12/24/24  Shawn Hernandez MD   multivitamin tablet Take 1 tablet by mouth once daily.    Historical Provider, MD   naproxen (Naprosyn) 375 mg tablet Take 1 tablet (375 mg) by mouth 2 times daily (morning and late afternoon).    Historical Provider, MD   NON FORMULARY Disability parking placard needed for permanent lifetime orthopaedic disability. 10/25/21   Historical Provider, MD   rosuvastatin (Crestor) 20 mg tablet Take 1 tablet (20 mg) by mouth once daily. 11/1/24 1/30/25  Christopher D'Amico, DO   methocarbamol (Robaxin) 500 mg tablet Take 1 tablet (500 mg) by mouth 3 times a day as needed for muscle spasms for up to 10 days. 12/14/24 12/14/24  Shawn Hernandez MD        PAT ROS     DASI Risk Score    No data to display       Caprini DVT Assessment    No data to display       Modified Frailty Index    No data to display       CHADS2 Stroke Risk  Current as of yesterday        N/A 3 to 100%: High Risk   2 to < 3%: Medium Risk   0 to < 2%: Low Risk     Last Change: N/A          This score determines the patient's risk of having a stroke if the patient has atrial fibrillation.        This score is not applicable to this patient. Components are not calculated.          Revised Cardiac Risk Index    No data to display       Apfel Simplified  Score    No data to display       Risk Analysis Index Results This Encounter    No data found in the last 10 encounters.       Prodigy: High Risk  Total Score: 8              Prodigy Age Score           ARISCAT Score for Postoperative Pulmonary Complications    No data to display       Hutchison Perioperative Risk for Myocardial Infarction or Cardiac Arrest (SHIRA)    No data to display         Nurse Plan of Action:   RN screening call complete.  Reviewed allergies, medications and pharmacy, medical, surgical and social history with patient.  Chart updated.

## 2024-12-27 ENCOUNTER — PRE-ADMISSION TESTING (OUTPATIENT)
Dept: PREADMISSION TESTING | Facility: HOSPITAL | Age: 64
End: 2024-12-27
Payer: COMMERCIAL

## 2024-12-27 ENCOUNTER — LAB (OUTPATIENT)
Dept: LAB | Facility: LAB | Age: 64
End: 2024-12-27
Payer: COMMERCIAL

## 2024-12-27 ENCOUNTER — DOCUMENTATION (OUTPATIENT)
Dept: PREADMISSION TESTING | Facility: HOSPITAL | Age: 64
End: 2024-12-27

## 2024-12-27 VITALS
HEART RATE: 68 BPM | RESPIRATION RATE: 18 BRPM | BODY MASS INDEX: 32.1 KG/M2 | DIASTOLIC BLOOD PRESSURE: 69 MMHG | TEMPERATURE: 98.8 F | WEIGHT: 188 LBS | SYSTOLIC BLOOD PRESSURE: 152 MMHG | HEIGHT: 64 IN | OXYGEN SATURATION: 100 %

## 2024-12-27 DIAGNOSIS — Z01.818 PREOP TESTING: Primary | ICD-10-CM

## 2024-12-27 DIAGNOSIS — Z01.818 PREOP TESTING: ICD-10-CM

## 2024-12-27 LAB
ANION GAP SERPL CALC-SCNC: 13 MMOL/L (ref 10–20)
ATRIAL RATE: 58 BPM
BASOPHILS # BLD AUTO: 0.03 X10*3/UL (ref 0–0.1)
BASOPHILS NFR BLD AUTO: 0.5 %
BUN SERPL-MCNC: 15 MG/DL (ref 6–23)
CALCIUM SERPL-MCNC: 10.2 MG/DL (ref 8.6–10.3)
CHLORIDE SERPL-SCNC: 107 MMOL/L (ref 98–107)
CO2 SERPL-SCNC: 27 MMOL/L (ref 21–32)
CREAT SERPL-MCNC: 0.76 MG/DL (ref 0.5–1.05)
EGFRCR SERPLBLD CKD-EPI 2021: 87 ML/MIN/1.73M*2
EOSINOPHIL # BLD AUTO: 0.1 X10*3/UL (ref 0–0.7)
EOSINOPHIL NFR BLD AUTO: 1.6 %
ERYTHROCYTE [DISTWIDTH] IN BLOOD BY AUTOMATED COUNT: 15.4 % (ref 11.5–14.5)
GLUCOSE SERPL-MCNC: 91 MG/DL (ref 74–99)
HCT VFR BLD AUTO: 39.4 % (ref 36–46)
HGB BLD-MCNC: 12.8 G/DL (ref 12–16)
IMM GRANULOCYTES # BLD AUTO: 0.05 X10*3/UL (ref 0–0.7)
IMM GRANULOCYTES NFR BLD AUTO: 0.8 % (ref 0–0.9)
LYMPHOCYTES # BLD AUTO: 1.54 X10*3/UL (ref 1.2–4.8)
LYMPHOCYTES NFR BLD AUTO: 24 %
MCH RBC QN AUTO: 28.6 PG (ref 26–34)
MCHC RBC AUTO-ENTMCNC: 32.5 G/DL (ref 32–36)
MCV RBC AUTO: 88 FL (ref 80–100)
MONOCYTES # BLD AUTO: 0.34 X10*3/UL (ref 0.1–1)
MONOCYTES NFR BLD AUTO: 5.3 %
NEUTROPHILS # BLD AUTO: 4.37 X10*3/UL (ref 1.2–7.7)
NEUTROPHILS NFR BLD AUTO: 67.8 %
NRBC BLD-RTO: 0 /100 WBCS (ref 0–0)
P AXIS: 69 DEGREES
P OFFSET: 221 MS
P ONSET: 173 MS
PLATELET # BLD AUTO: 305 X10*3/UL (ref 150–450)
POTASSIUM SERPL-SCNC: 4.1 MMOL/L (ref 3.5–5.3)
PR INTERVAL: 110 MS
Q ONSET: 228 MS
QRS COUNT: 9 BEATS
QRS DURATION: 72 MS
QT INTERVAL: 396 MS
QTC CALCULATION(BAZETT): 388 MS
QTC FREDERICIA: 391 MS
R AXIS: -16 DEGREES
RBC # BLD AUTO: 4.48 X10*6/UL (ref 4–5.2)
SODIUM SERPL-SCNC: 143 MMOL/L (ref 136–145)
T AXIS: -22 DEGREES
T OFFSET: 426 MS
VENTRICULAR RATE: 58 BPM
WBC # BLD AUTO: 6.4 X10*3/UL (ref 4.4–11.3)

## 2024-12-27 PROCEDURE — 93010 ELECTROCARDIOGRAM REPORT: CPT | Performed by: INTERNAL MEDICINE

## 2024-12-27 PROCEDURE — 85025 COMPLETE CBC W/AUTO DIFF WBC: CPT

## 2024-12-27 PROCEDURE — 80048 BASIC METABOLIC PNL TOTAL CA: CPT

## 2024-12-27 PROCEDURE — 87081 CULTURE SCREEN ONLY: CPT | Mod: AHULAB | Performed by: PHYSICIAN ASSISTANT

## 2024-12-27 PROCEDURE — 99204 OFFICE O/P NEW MOD 45 MIN: CPT | Performed by: PHYSICIAN ASSISTANT

## 2024-12-27 PROCEDURE — 93005 ELECTROCARDIOGRAM TRACING: CPT | Performed by: PHYSICIAN ASSISTANT

## 2024-12-27 RX ORDER — CHLORHEXIDINE GLUCONATE ORAL RINSE 1.2 MG/ML
SOLUTION DENTAL
Qty: 473 ML | Refills: 0 | Status: SHIPPED | OUTPATIENT
Start: 2024-12-27

## 2024-12-27 ASSESSMENT — ENCOUNTER SYMPTOMS
ENDOCRINE NEGATIVE: 1
ARTHRALGIAS: 1
GASTROINTESTINAL NEGATIVE: 1
CARDIOVASCULAR NEGATIVE: 1
BACK PAIN: 1
ALLERGIC/IMMUNOLOGIC NEGATIVE: 1
PSYCHIATRIC NEGATIVE: 1
HEMATOLOGIC/LYMPHATIC NEGATIVE: 1
CONSTITUTIONAL NEGATIVE: 1
EYES NEGATIVE: 1
RESPIRATORY NEGATIVE: 1
NEUROLOGICAL NEGATIVE: 1

## 2024-12-27 NOTE — PREPROCEDURE INSTRUCTIONS
"   Medication List            Accurate as of December 27, 2024 10:43 AM. Always use your most recent med list.                acetaminophen 325 mg tablet  Commonly known as: Tylenol  Notes to patient: Use if needed morning of surgery        albuterol 90 mcg/actuation inhaler  Inhale 2 puffs every 4 hours if needed for wheezing.  Medication Adjustments for Surgery: Take/Use as prescribed     aspirin 81 mg chewable tablet  Chew 1 tablet (81 mg) once daily.  Medication Adjustments for Surgery: Take on the morning of surgery     BD Ultra-Fine Mini Pen Needle 31 gauge x 3/16\" needle  Generic drug: pen needle, diabetic     cranberry extract 500 mg capsule  Notes to patient: HOLD 7 Days prior to surgery - LD 12/30     DIETARY SUPPLEMENT ORAL  Notes to patient: HOLD 7 Days prior to surgery - LD 12/30     FISH OIL ORAL  Notes to patient: HOLD 7 Days prior to surgery - LD 12/30     fluticasone 50 mcg/actuation nasal spray  Commonly known as: Flonase  Medication Adjustments for Surgery: Take/Use as prescribed     metFORMIN 500 mg tablet  Commonly known as: Glucophage  Take 1 tablet (500 mg) by mouth 2 times daily (morning and late afternoon).  Medication Adjustments for Surgery: Do Not take on the morning of surgery     methocarbamol 500 mg tablet  Commonly known as: Robaxin  Take 1 tablet (500 mg) by mouth 3 times a day as needed for muscle spasms for up to 10 days.  Medication Adjustments for Surgery: Take/Use as prescribed     multivitamin tablet  Notes to patient: HOLD 7 Days prior to surgery - LD 12/30     naproxen 375 mg tablet  Commonly known as: Naprosyn  Notes to patient: HOLD 7 Days prior to surgery - LD 12/30     NON FORMULARY  Notes to patient: Disability car placard      rosuvastatin 20 mg tablet  Commonly known as: Crestor  Take 1 tablet (20 mg) by mouth once daily.  Medication Adjustments for Surgery: Take/Use as prescribed                 Concerning above medication instructions - If medication is normally " taken at night continue normal schedule - do not take night prior and morning of surgery.       Preoperative Deep Breathing Exercises  Why it is important to do deep breathing exercises before my surgery?  Deep breathing exercises strengthen your breathing muscles.  This helps you to recover after your surgery and decreases the chance of breathing complications.  How are the deep breathing exercises done?  Sit straight with your back supported.  Breathe in deeply and slowly through your nose. Your lower rib cage should expand and your abdomen may move forward.  Hold that breath for 3 to 5 seconds.  Breathe out through pursed lips, slowly and completely.  Rest and repeat 10 times every hour while awake.  Rest longer if you become dizzy or lightheaded.      CONTACT SURGEON'S OFFICE IF YOU DEVELOP:  * Fever = 100.4 F   * New respiratory symptoms (e.g. cough, shortness of breath, respiratory distress, sore throat)  * Recent loss of taste or smell  *Flu like symptoms such as headache, fatigue or gastrointestinal symptoms  * You develop any open sores, shingles, burning or painful urination   AND/OR:  * You no longer wish to have the surgery.  * Any other personal circumstances change that may lead to the need to cancel or defer this surgery.  *You were admitted to any hospital within one week of your planned procedure.    SMOKING:  *Quitting smoking can make a huge difference to your health and recovery from surgery.    *If you need help with quitting, call 2-800-QUIT-NOW.    THE DAY OF SURGERY:  *Do not eat any food after midnight the night before surgery/procedure.   *YOU MUST DRINK 14 oz drink clear liquids (i.e. water, black coffee/tea, (no milk or cream) apple juice, and electrolyte drinks (Gatorade)  2 hours before your instructed arrival time to the hospital.  *You may chew gum until  2 hours before your surgery/procedure.    SURGICAL TIME  *You will be contacted between 2 p.m. and 6 p.m. the business day before  your surgery with your arrival time.  *If you haven't received a call by 6pm, call [phone].  *Scheduled surgery times may change and you will be notified if this occurs-check your personal voicemail for any updates.    ON THE MORNING OF SURGERY:  *Wear comfortable, loose fitting clothing.   *Do not use moisturizers, creams, lotions or perfume.  *All jewelry and valuables should be left at home.  *Prosthetic devices such as contact lenses, hearing aids, dentures, eyelash extensions, hairpins and body piercing must be removed before surgery.    BRING WITH YOU:  *Photo ID and insurance card  *Current list of medicines and allergies  *Pacemaker/Defibrillator/Heart stent cards  *CPAP machine and mask  *Slings/splints/crutches  *Copy of your complete Advanced Directive/DHPOA-if applicable  *Neurostimulator implant remote    PARKING AND ARRIVAL:  *Check in at the Main Entrance desk and let them know you are here for surgery.  *You will be directed to the 2nd floor surgical waiting area.    AFTER OUTPATIENT SURGERY:  *A responsible adult MUST accompany you at the time of discharge and stay with you for 24 hours after your surgery.  *You may NOT drive yourself home after surgery.  *You may use a taxi or ride sharing service ([employer], [employer]) to return home ONLY if you are accompanied by a friend or family member.  *Instructions for resuming your medications will be provided by your surgeon.      Home Preoperative Antibacterial Shower     What is a home preoperative antibacterial shower?  This shower is a way of cleaning the skin with a germ killing soap before surgery.  The soap contains chlorhexidine, commonly known as CHG.  CHG is a soap for your skin with germ killing ability.  Let your doctor know if you are allergic to chlorhexidine.    Why do I need to take a preoperative antibacterial shower?  Skin is not sterile.  It is best to try to make your skin as free of germs as possible before surgery.  Proper cleansing with  a germ killing soap before surgery can lower the number of germs on your skin.  This helps to reduce the risk of infection at the surgical site.  Following the instructions listed below will help you prepare your skin for surgery.      How do I use the CHG skin cleanser?  Steps:  Begin using your CHG soap five days before your scheduled surgery on ________________________.    Days 1-4 Shower before bed:  Wash your face and genitals with your normal soap and rinse.    Wash and rinse your hair using the CHG soap. Rinse completely, do not condition your   Hair.          3.    Apply the CHG soap to a clean wet washcloth.  Turn the water off or move away                From the water spray to avoid premature rinsing of the CHG soap as you are applying.     4.   Lather your entire body from the neck down.  Do not use on your face or genitals.   Pay special attention to the area(s) where your incision(s) will be located unless they are on your face.  Avoid scrubbing your skin too hard.  The important point is to have the CHG soap sit on your skin for 3 minutes.    When the 3 minutes are up, turn on the water and rinse the CHG soap off your body completely.   Pat yourself dry with a clean, freshly-laundered towel.  Dress in clean, freshly laundered night clothes.    Be sure to sleep with clean, freshly laundered sheets.  Day 5:  Last shower is the morning before surgery: Follow above Instructions.    NOTE:    *Hair extensions should be removed    *Keep CHG soap out of eyes and ear canals   *DO NOT wash with regular soap on your body after you have used the CHG        soap solution  *DO NOT apply powders, lotions, or perfume.  *Deodorant may be used days 1-4, BUT NOT the day of surgery    Who should I contact if I have any questions regarding the use of CHG soap?  Call the [facility], Preadmission Testing at [phone] if you have any questions.              Patient Information: Pre-Operative  Infection Prevention Measures     Why did I have my nose, under my arms and groin swabbed?  The purpose of the swab is to identify Staphylococcus aureus inside your nose or on your skin.  The swab was sent to the laboratory for culture.  A positive swab/culture for Staphylococcus aureus is called colonization or carriage.      What is Staphylococcus aureus?  Staphylococcus aureus, also known as “staph”, is a germ found on the skin or in the nose of healthy people.  Sometimes Staphylococcus aureus can get into the body and cause an infection.  This can be minor (such as pimples, boils or other skin problems).  It might also be serious (such as blood infection, pneumonia or a surgical site infection).    What is Staphylococcus aureus colonization or carriage?  Colonization or carriage means that a person has the germ but is not sick from it.  These bacteria can be spread on the hands or when breathing or sneezing.    How is Staphylococcus aureus spread?  It is most often spread by close contact with a person or item that carries it.    What happens if my culture is positive for Staphylococcus aureus?  Your doctor/medical team will use this information to guide any antibiotic treatment which may be necessary.  Regardless of the culture results, we will clean the inside of your nose with a betadine swab just before you have your surgery.      Will I get an infection if I have Staphylococcus aureus in my nose or on my skin?  Anyone can get an infection with Staphylococcus aureus.  However, the best way to reduce your risk of infection is to follow the instructions provided to you for the use of your CHG soap and dental rinse.        Who should I contact if I have any questions?  Call the [facility], Preadmission Testing at [phone] if you have any questions.           Patient Information: Oral/Dental Rinse  **This is a prescription; pick it up at your preferred local pharmacy **  What  is oral/dental rinse?   It is a mouthwash. It is a way of cleaning the mouth with a germ killing solution before your surgery.  The solution contains chlorhexidine, commonly known as CHG.   It is used inside the mouth to kill a bacteria known as Staphylococcus aureus.  Let your doctor know if you are allergic to Chlorhexidine.    Why do I need to use CHG oral/dental rinse?  The CHG oral/dental rinse helps to kill a bacteria in your mouth known a Staphylococcus aureus.     This reduces the risk of infection at the surgical site.      Using your CHG oral/dental rinse  STEPS:  Use your CHG oral/dental rinse after you brush your teeth the night before (at bedtime) and the morning of your surgery.  Follow all directions on your prescription label.    Use the cap on the container to measure 15ml (fill cap to fill line)  Swish (gargle if you can) the mouthwash in your mouth for at least 30 seconds, (do not to swallow) spit out  After you use your CHG rinse, do not rinse your mouth with water, drink or eat.  Please refer to prescription label for the appropriate time to resume oral intake  Dental rinse comes in one size bottle: 473ml ~16oz.  You will have leftover    rinse, discard after this use.    What side effects might I have using the CHG oral/dental rinse?  CHG rinse will stick to plaque on the teeth.  Brush and floss just before use.  Teeth brushing will help avoid staining of plaque during use.    Who should I contact if I have questions about the CHG oral/dental rinse?  Please call [facility], Preadmission Testing at [phone] if you have any questions

## 2024-12-27 NOTE — CPM/PAT H&P
[facility] Evaluation       Name: Bettina Kowalski (Bettina Kowalski)  /Age: 1960/64 y.o.     In-Person       Chief Complaint: Primary osteoarthritis of right hip     HPI      Date of Consult: 24    Referring Provider: Dr. Platt     Surgery, Date, and Length: Right Hip Total Arthroplasty ~ Anterior Approach - Right ; 25; 240 minutes   NEW DOS 25    Bettina Kowalski  is a 64 year-old female who presents to the [facility] for perioperative risk assessment prior to surgery. Patient is presenting for right hip pain. She has had this pain for many years. She has tried a variety of conservative measures. Unfortunately, these are no longer providing relief. At this point, the pain is limiting activities of daily living and hobbies.       This note was created in part upon personal review of patient's medical records.      Patient is scheduled to have Right Hip Total Arthroplasty ~ Anterior Approach - Right     Medical History  Past Medical History:   Diagnosis Date    Anemia     Asthma     stable    B12 deficiency     Cataract, bilateral     Cerebral herniation (Multi)     Chronic insomnia     Depression     History of blood transfusion     History of Chiari malformation     stable no symptoms    History of prediabetes     Hyperlipidemia     stable without medication    Hypertension     stable without medication    Multinodular goiter     presently no medication    Obesity     Pelvic floor weakness     Prediabetes     Primary osteoarthritis of right hip     Plan: Right Hip Total Arthroplasty Anterior Approach 25    Right lumbar radiculopathy     Seropositive rheumatoid arthritis             Surgical History  Past Surgical History:   Procedure Laterality Date    COLONOSCOPY      HYSTERECTOMY  2009    LUMBAR FUSION      OTHER SURGICAL HISTORY      Skull Excision Subfascial Soft Tissue Tumor    TOTAL KNEE ARTHROPLASTY Left     TRANSOBTURATOR SLING               Family history:  Family History   Problem  "Relation Name Age of Onset    Stroke Mother      Breast cancer Mother's Sister  74        Social history:  Social History     Socioeconomic History    Marital status:      Spouse name: Not on file    Number of children: Not on file    Years of education: Not on file    Highest education level: Not on file   Occupational History    Not on file   Tobacco Use    Smoking status: Former     Current packs/day: 0.00     Average packs/day: 2.0 packs/day for 10.0 years (20.0 ttl pk-yrs)     Types: Cigarettes     Start date:      Quit date:      Years since quittin.0    Smokeless tobacco: Never   Vaping Use    Vaping status: Never Used   Substance and Sexual Activity    Alcohol use: Never    Drug use: Never    Sexual activity: Defer   Other Topics Concern    Not on file   Social History Narrative    Not on file     Social Drivers of Health     Financial Resource Strain: Not on file   Food Insecurity: Not on file   Transportation Needs: Not on file   Physical Activity: Not on file   Stress: Not on file   Social Connections: Not on file   Intimate Partner Violence: Not on file   Housing Stability: Not on file        Current Outpatient Medications   Medication Instructions    acetaminophen (TYLENOL) 650 mg, Every 8 hours PRN    albuterol 90 mcg/actuation inhaler 2 puffs, inhalation, Every 4 hours PRN    aspirin 81 mg, oral, Daily    BD Ultra-Fine Mini Pen Needle 31 gauge x 3/16\" needle     chlorhexidine (Peridex) 0.12 % solution 15 ml swish and spit for 30 seconds night prior to surgery and morning of surgery    cranberry extract 500 mg capsule 1 capsule, Daily    DIETARY SUPPLEMENT ORAL 1 Dose, 2 times daily    docosahexaenoic acid/epa (FISH OIL ORAL) 1 Dose, Daily    fluticasone (Flonase) 50 mcg/actuation nasal spray 1 spray, As needed    metFORMIN (GLUCOPHAGE) 500 mg, oral, 2 times daily (morning and late afternoon)    methocarbamol (ROBAXIN) 500 mg, oral, 3 times daily PRN    multivitamin tablet 1 tablet, " "Daily    naproxen (NAPROSYN) 375 mg, 2 times daily (morning and late afternoon)    NON FORMULARY Disability parking placard needed for permanent lifetime orthopaedic disability.    rosuvastatin (CRESTOR) 20 mg, oral, Daily          Visit Vitals  /69   Pulse 68   Temp 37.1 °C (98.8 °F)   Resp 18   Ht 1.626 m (5' 4\")   Wt 85.3 kg (188 lb)   SpO2 100%   BMI 32.27 kg/m²   OB Status Postmenopausal   Smoking Status Former   BSA 1.96 m²        Review of Systems   Constitutional: Negative.    HENT: Negative.     Eyes: Negative.    Respiratory: Negative.     Cardiovascular: Negative.         METS unable to do anything presently - in bed past 3.5 weeks     Previously ROJAS with stairs no chest pain   No chest pain or SOB at rest   Unable to be active now for a period of time   Gastrointestinal: Negative.    Endocrine: Negative.    Genitourinary: Negative.    Musculoskeletal:  Positive for arthralgias and back pain.        Right hip pain  Low back pain radiating RLE      Skin: Negative.    Allergic/Immunologic: Negative.    Neurological: Negative.    Hematological: Negative.    Psychiatric/Behavioral: Negative.          Physical Exam  Vitals reviewed.   Constitutional:       General: She is in acute distress (pain level 10/10 - states has previously gone to ED and would again if necessary due to the pain).      Appearance: Normal appearance.      Comments: Pt in wheelchair - unable to get up for scale even    HENT:      Head: Normocephalic and atraumatic.      Right Ear: External ear normal.      Left Ear: External ear normal.      Nose: Nose normal.      Mouth/Throat:      Pharynx: Oropharynx is clear.   Eyes:      Extraocular Movements: Extraocular movements intact.      Conjunctiva/sclera: Conjunctivae normal.      Pupils: Pupils are equal, round, and reactive to light.   Cardiovascular:      Rate and Rhythm: Normal rate and regular rhythm.      Heart sounds: Normal heart sounds.   Pulmonary:      Effort: Pulmonary " effort is normal.      Breath sounds: Normal breath sounds.   Abdominal:      Palpations: Abdomen is soft.   Musculoskeletal:         General: Normal range of motion.      Cervical back: Normal range of motion and neck supple.      Comments: Back and right hip and RLE pain    Skin:     General: Skin is warm and dry.   Neurological:      General: No focal deficit present.      Mental Status: She is alert and oriented to person, place, and time.   Psychiatric:         Mood and Affect: Mood normal.         Behavior: Behavior normal.          PAT AIRWAY:   Airway:     Mallampati::  II    Neck ROM::  Full    Lab Results   Component Value Date    WBC 6.4 12/27/2024    HGB 12.8 12/27/2024    HCT 39.4 12/27/2024    MCV 88 12/27/2024     12/27/2024      Lab Results   Component Value Date    GLUCOSE 91 12/27/2024    CALCIUM 10.2 12/27/2024     12/27/2024    K 4.1 12/27/2024    CO2 27 12/27/2024     12/27/2024    BUN 15 12/27/2024    CREATININE 0.76 12/27/2024      EKG 12/27/24  Sinus bradycardia with short ID  Septal infarct,age undetermined  T wave abnormality, consider lateral ischemia  58 BPM     NST 1/13/25  Impression   1. Negative myocardial perfusion study without evidence of inducible  myocardial ischemia or prior infarction.  2. The left ventricle is normal in size.  3. Normal LV wall motion with a post-stress LV EF estimated greater  than 65%.     TTE 1/13/25  CONCLUSIONS:   1. Left ventricular ejection fraction is normal, by visual estimate at 65-70%.   2. There is normal right ventricular global systolic function.   3. Right ventricular systolic pressure is within normal limits.         Patient is scheduled to have Right Hip Total Arthroplasty ~ Anterior Approach - Right     Cardiovascular  METS: unable to presently determine   RCRI: 0  , 3.9 % Risk of MACE  Caprini: 8      HLD-rosuvastatin Continue DOS     Pulmonary  Stop Bang score is 1 placing patient at low risk for JC  ARISCAT: 26-44  points, 13.3% risk of in-hospital postoperative pulmonary complication  PRODIGY: Moderate risk for opioid induced respiratory depression    Asthma- inhalers .cdso        Endocrinology  Prediabetic - metformin HOLD DOS     Hematology       Patient instructed to ambulate as soon as possible postoperatively to decrease thromboembolic risk.      Initiate mechanical DVT prophylaxis as soon as possible and initiate chemical prophylaxis when deemed safe from a bleeding standpoint post surgery.       VTE prophylaxis per surgical team       Per last PCP note Dr. D'Amico 10/31/24:  History very concerning for anginal symptoms discussed stress test, patient declining currently I have put in a cardiology referral encouraged her to establish: She knows to go to emergency department if having any active chest pain     We have set up cardiology apt with Dr. Moses Inman at [facility] 1/2/2 5at 10am      CARDIAC CLEARANCE:   NST and TTE completed 1/13/25 - results above   Per Dr. Inman :   Normal cardiac testing, cleared for surgery     Tests ordered in PAT: cbc, bmp, mrsa , EKG   LABS REVIEWED: unremarkable     Risk assessment complete.  Patient is scheduled for a intermediate surgical risk procedure.        Preoperative medication instructions were provided and reviewed with the patient.  Any additional testing or evaluation was explained to the patient.  Nothing by mouth instructions were discussed and patient's questions were answered prior to conclusion to this encounter.  Patient verbalized understanding of preoperative instructions given in preadmission testing; discharge instructions available in EMR.

## 2024-12-27 NOTE — CPM/PAT NURSE NOTE
"CPM/PAT Nurse Note      Name: Bettina JOCELYNE Kowalski (Bettina CASANOVA Dex)  /Age: 1960/64 y.o.       Past Medical History:   Diagnosis Date    Anemia     Asthma     stable    B12 deficiency     Cataract, bilateral     Cerebral herniation (Multi)     Chronic insomnia     Depression     History of blood transfusion     History of Chiari malformation     stable no symptoms    History of prediabetes     Hyperlipidemia     stable without medication    Hypertension     stable without medication    Multinodular goiter     presently no medication    Obesity     Pelvic floor weakness     Prediabetes     Primary osteoarthritis of right hip     Plan: Right Hip Total Arthroplasty Anterior Approach 25    Right lumbar radiculopathy     Seropositive rheumatoid arthritis        Past Surgical History:   Procedure Laterality Date    COLONOSCOPY      HYSTERECTOMY  2009    LUMBAR FUSION      OTHER SURGICAL HISTORY      Skull Excision Subfascial Soft Tissue Tumor    TOTAL KNEE ARTHROPLASTY Left     TRANSOBTURATOR SLING         Patient Sexual activity questions deferred to the physician.    Family History   Problem Relation Name Age of Onset    Stroke Mother      Breast cancer Mother's Sister  74       Allergies   Allergen Reactions    Gabapentin Hallucinations    Metronidazole Hives       Prior to Admission medications    Medication Sig Start Date End Date Taking? Authorizing Provider   acetaminophen (Tylenol) 325 mg tablet Take 2 tablets (650 mg) by mouth every 8 hours if needed for mild pain (1 - 3).    Historical Provider, MD   albuterol 90 mcg/actuation inhaler Inhale 2 puffs every 4 hours if needed for wheezing. 10/31/24   Christopher D'Amico, DO   aspirin 81 mg chewable tablet Chew 1 tablet (81 mg) once daily. 10/31/24 10/31/25  Christopher D'Amico, DO   BD Ultra-Fine Mini Pen Needle 31 gauge x 3/16\" needle  23   Historical Provider, MD   chlorhexidine (Peridex) 0.12 % solution 15 ml swish and spit for 30 seconds night " prior to surgery and morning of surgery 12/27/24   Neida Fulton PA-C   cranberry extract 500 mg capsule Take 1 capsule by mouth once daily.    Historical Provider, MD   DIETARY SUPPLEMENT ORAL Take 1 Dose by mouth 2 times a day. Sciatica support    Historical Provider, MD   docosahexaenoic acid/epa (FISH OIL ORAL) Take 1 Dose by mouth once daily.    Historical Provider, MD   fluticasone (Flonase) 50 mcg/actuation nasal spray Administer 1 spray into each nostril if needed for rhinitis or allergies. Shake gently. Before first use, prime pump. After use, clean tip and replace cap.    Historical Provider, MD   metFORMIN (Glucophage) 500 mg tablet Take 1 tablet (500 mg) by mouth 2 times daily (morning and late afternoon). 10/31/24   Christopher D'Amico, DO   methocarbamol (Robaxin) 500 mg tablet Take 1 tablet (500 mg) by mouth 3 times a day as needed for muscle spasms for up to 10 days. 12/14/24 12/27/24  Shawn Hernandez MD   multivitamin tablet Take 1 tablet by mouth once daily.    Historical Provider, MD   naproxen (Naprosyn) 375 mg tablet Take 1 tablet (375 mg) by mouth 2 times daily (morning and late afternoon).    Historical Provider, MD   NON FORMULARY Disability parking placard needed for permanent lifetime orthopaedic disability. 10/25/21   Historical Provider, MD   rosuvastatin (Crestor) 20 mg tablet Take 1 tablet (20 mg) by mouth once daily. 11/1/24 1/30/25  Christopher D'Amico, DO        PAT ROS     DASI Risk Score    No data to display       Caprini DVT Assessment    No data to display       Modified Frailty Index    No data to display       CHADS2 Stroke Risk  Current as of 56 minutes ago        N/A 3 to 100%: High Risk   2 to < 3%: Medium Risk   0 to < 2%: Low Risk     Last Change: N/A          This score determines the patient's risk of having a stroke if the patient has atrial fibrillation.        This score is not applicable to this patient. Components are not calculated.          Revised  Cardiac Risk Index    No data to display       Apfel Simplified Score    No data to display       Risk Analysis Index Results This Encounter    No data found in the last 10 encounters.       Stop Bang Score      Flowsheet Row Pre-Admission Testing from 12/27/2024 in [facility] with Neida Fulton PA-C   Do you snore loudly? 0 filed at 12/27/2024 1128   Do you often feel tired or fatigued after your sleep? 0 filed at 12/27/2024 1128   Has anyone ever observed you stop breathing in your sleep? 0 filed at 12/27/2024 1128   Do you have or are you being treated for high blood pressure? 0 filed at 12/27/2024 1128   Recent BMI (Calculated) 32.3 filed at 12/27/2024 1128   Is BMI greater than 35 kg/m2? 0=No filed at 12/27/2024 1128   Age older than 50 years old? 1=Yes filed at 12/27/2024 1128   Is your neck circumference greater than 17 inches (Male) or 16 inches (Female)? 0 filed at 12/27/2024 1128   Gender - Male 0=No filed at 12/27/2024 1128   STOP-BANG Total Score 1 filed at 12/27/2024 1128          Prodigy: High Risk  Total Score: 8              Prodigy Age Score           ARISCAT Score for Postoperative Pulmonary Complications      Flowsheet Row Pre-Admission Testing from 12/27/2024 in [facility] with Neida Fulton PA-C   Age, years  3 filed at 12/27/2024 1128   Preoperative SpO2 0 filed at 12/27/2024 1128   Respiratory infection in the last month Either upper or lower (i.e., URI, bronchitis, pneumonia), with fever and antibiotic treatment 0 filed at 12/27/2024 1128   Preoperative anemoa (Hgb less than 10 g/dl) 0 filed at 12/27/2024 1128   Surgical incision  0 filed at 12/27/2024 1128   Duration of surgery  23 filed at 12/27/2024 1128   Emergency Procedure  0 filed at 12/27/2024 1128   ARISCAT Total Score  26 filed at 12/27/2024 1128          Hutchison Perioperative Risk for Myocardial Infarction or Cardiac Arrest (SHIRA)    No data to display         Nurse Plan of Action: After Visit  Summary (AVS) reviewed and patient verbalized good understanding of medications and NPO instructions.  Pre-op infection prevention measures:  CHG showers and mouthwash reviewed, understanding voiced.  CHG soap given and patient verbalized need to pick CHG mouthwash at their preferred local pharmacy.

## 2024-12-27 NOTE — CPM/PAT NURSE NOTE
"CPM/PAT Nurse Note      Name: Bettina JOCELYNE Kowalski (Bettina CASANOVA Dex)  /Age: 1960/64 y.o.       Past Medical History:   Diagnosis Date    Anemia     Asthma     stable    B12 deficiency     Cataract, bilateral     Cerebral herniation (Multi)     Chronic insomnia     Depression     History of blood transfusion     History of Chiari malformation     stable no symptoms    History of prediabetes     Hyperlipidemia     stable without medication    Hypertension     stable without medication    Multinodular goiter     presently no medication    Obesity     Pelvic floor weakness     Prediabetes     Primary osteoarthritis of right hip     Plan: Right Hip Total Arthroplasty Anterior Approach 25    Right lumbar radiculopathy     Seropositive rheumatoid arthritis        Past Surgical History:   Procedure Laterality Date    COLONOSCOPY      HYSTERECTOMY  2009    LUMBAR FUSION      OTHER SURGICAL HISTORY      Skull Excision Subfascial Soft Tissue Tumor    TOTAL KNEE ARTHROPLASTY Left     TRANSOBTURATOR SLING         Patient Sexual activity questions deferred to the physician.    Family History   Problem Relation Name Age of Onset    Stroke Mother      Breast cancer Mother's Sister  74       Allergies   Allergen Reactions    Gabapentin Hallucinations    Metronidazole Hives       Prior to Admission medications    Medication Sig Start Date End Date Taking? Authorizing Provider   acetaminophen (Tylenol) 325 mg tablet Take 2 tablets (650 mg) by mouth every 8 hours if needed for mild pain (1 - 3).    Historical Provider, MD   albuterol 90 mcg/actuation inhaler Inhale 2 puffs every 4 hours if needed for wheezing. 10/31/24   Christopher D'Amico, DO   aspirin 81 mg chewable tablet Chew 1 tablet (81 mg) once daily. 10/31/24 10/31/25  Christopher D'Amico, DO   BD Ultra-Fine Mini Pen Needle 31 gauge x 3/16\" needle  23   Historical Provider, MD   chlorhexidine (Peridex) 0.12 % solution 15 ml swish and spit for 30 seconds night " prior to surgery and morning of surgery 12/27/24   Neida Fulton PA-C   cranberry extract 500 mg capsule Take 1 capsule by mouth once daily.    Historical Provider, MD   DIETARY SUPPLEMENT ORAL Take 1 Dose by mouth 2 times a day. Sciatica support    Historical Provider, MD   docosahexaenoic acid/epa (FISH OIL ORAL) Take 1 Dose by mouth once daily.    Historical Provider, MD   fluticasone (Flonase) 50 mcg/actuation nasal spray Administer 1 spray into each nostril if needed for rhinitis or allergies. Shake gently. Before first use, prime pump. After use, clean tip and replace cap.    Historical Provider, MD   metFORMIN (Glucophage) 500 mg tablet Take 1 tablet (500 mg) by mouth 2 times daily (morning and late afternoon). 10/31/24   Christopher D'Amico, DO   methocarbamol (Robaxin) 500 mg tablet Take 1 tablet (500 mg) by mouth 3 times a day as needed for muscle spasms for up to 10 days. 12/14/24 12/27/24  Shawn Hernandez MD   multivitamin tablet Take 1 tablet by mouth once daily.    Historical Provider, MD   naproxen (Naprosyn) 375 mg tablet Take 1 tablet (375 mg) by mouth 2 times daily (morning and late afternoon).    Historical Provider, MD   NON FORMULARY Disability parking placard needed for permanent lifetime orthopaedic disability. 10/25/21   Historical Provider, MD   rosuvastatin (Crestor) 20 mg tablet Take 1 tablet (20 mg) by mouth once daily. 11/1/24 1/30/25  Christopher D'Amico, DO        PAT ROS     DASI Risk Score    No data to display       Caprini DVT Assessment    No data to display       Modified Frailty Index    No data to display       CHADS2 Stroke Risk  Current as of 56 minutes ago        N/A 3 to 100%: High Risk   2 to < 3%: Medium Risk   0 to < 2%: Low Risk     Last Change: N/A          This score determines the patient's risk of having a stroke if the patient has atrial fibrillation.        This score is not applicable to this patient. Components are not calculated.          Revised  Cardiac Risk Index    No data to display       Apfel Simplified Score    No data to display       Risk Analysis Index Results This Encounter    No data found in the last 10 encounters.       Stop Bang Score      Flowsheet Row Pre-Admission Testing from 12/27/2024 in [facility] with Neida Fulton PA-C   Do you snore loudly? 0 filed at 12/27/2024 1128   Do you often feel tired or fatigued after your sleep? 0 filed at 12/27/2024 1128   Has anyone ever observed you stop breathing in your sleep? 0 filed at 12/27/2024 1128   Do you have or are you being treated for high blood pressure? 0 filed at 12/27/2024 1128   Recent BMI (Calculated) 32.3 filed at 12/27/2024 1128   Is BMI greater than 35 kg/m2? 0=No filed at 12/27/2024 1128   Age older than 50 years old? 1=Yes filed at 12/27/2024 1128   Is your neck circumference greater than 17 inches (Male) or 16 inches (Female)? 0 filed at 12/27/2024 1128   Gender - Male 0=No filed at 12/27/2024 1128   STOP-BANG Total Score 1 filed at 12/27/2024 1128          Prodigy: High Risk  Total Score: 8              Prodigy Age Score           ARISCAT Score for Postoperative Pulmonary Complications      Flowsheet Row Pre-Admission Testing from 12/27/2024 in [facility] [place] with Neida Fulton PA-C   Age, years  3 filed at 12/27/2024 1128   Preoperative SpO2 0 filed at 12/27/2024 1128   Respiratory infection in the last month Either upper or lower (i.e., URI, bronchitis, pneumonia), with fever and antibiotic treatment 0 filed at 12/27/2024 1128   Preoperative anemoa (Hgb less than 10 g/dl) 0 filed at 12/27/2024 1128   Surgical incision  0 filed at 12/27/2024 1128   Duration of surgery  23 filed at 12/27/2024 1128   Emergency Procedure  0 filed at 12/27/2024 1128   ARISCAT Total Score  26 filed at 12/27/2024 1128          H[place]ison Perioperative Risk for Myocardial Infarction or Cardiac Arrest (SHIRA)    No data to display         Nurse Plan of Action:After Visit  Summary (AVS) reviewed and patient verbalized good understanding of medications and NPO instructions.

## 2024-12-29 LAB — STAPHYLOCOCCUS SPEC CULT: NORMAL

## 2024-12-30 ENCOUNTER — TELEPHONE (OUTPATIENT)
Dept: ORTHOPEDIC SURGERY | Facility: HOSPITAL | Age: 64
End: 2024-12-30
Payer: COMMERCIAL

## 2024-12-30 ENCOUNTER — APPOINTMENT (OUTPATIENT)
Dept: ORTHOPEDIC SURGERY | Facility: CLINIC | Age: 64
End: 2024-12-30
Payer: COMMERCIAL

## 2024-12-30 NOTE — TELEPHONE ENCOUNTER
Called patient to discuss upcoming surgery. Confirmed that the plan is to be determined in regards to same day discharge vs overnight stay. The patient has not obtained their medical equipment. The patient does not have a care partner to assist them at home postoperatively. They live in a house.  The patient does have 16 stairs required for navigating the home. The patient currently does not use an assistive device. Reminded patient to follow PAT instructions leading up to surgery and to watch for a phone call from preop the day prior to their surgery to receive details about arrival time. All questions answered at this time.

## 2025-01-01 LAB
ATRIAL RATE: 58 BPM
P AXIS: 69 DEGREES
P OFFSET: 221 MS
P ONSET: 173 MS
PR INTERVAL: 110 MS
Q ONSET: 228 MS
QRS COUNT: 9 BEATS
QRS DURATION: 72 MS
QT INTERVAL: 396 MS
QTC CALCULATION(BAZETT): 388 MS
QTC FREDERICIA: 391 MS
R AXIS: -16 DEGREES
T AXIS: -22 DEGREES
T OFFSET: 426 MS
VENTRICULAR RATE: 58 BPM

## 2025-01-02 ENCOUNTER — APPOINTMENT (OUTPATIENT)
Dept: CARDIOLOGY | Facility: CLINIC | Age: 65
End: 2025-01-02
Payer: MEDICARE

## 2025-01-02 VITALS
WEIGHT: 188 LBS | DIASTOLIC BLOOD PRESSURE: 92 MMHG | BODY MASS INDEX: 32.1 KG/M2 | SYSTOLIC BLOOD PRESSURE: 160 MMHG | HEIGHT: 64 IN | HEART RATE: 71 BPM

## 2025-01-02 DIAGNOSIS — R94.31 ABNORMAL EKG: Primary | ICD-10-CM

## 2025-01-02 DIAGNOSIS — E78.5 HYPERLIPIDEMIA, UNSPECIFIED HYPERLIPIDEMIA TYPE: ICD-10-CM

## 2025-01-02 DIAGNOSIS — R07.9 CHEST PAIN, UNSPECIFIED TYPE: ICD-10-CM

## 2025-01-02 DIAGNOSIS — I10 BENIGN HYPERTENSION: ICD-10-CM

## 2025-01-02 DIAGNOSIS — Z01.810 PREOPERATIVE CARDIOVASCULAR EXAMINATION: ICD-10-CM

## 2025-01-02 PROCEDURE — 1159F MED LIST DOCD IN RCRD: CPT | Performed by: INTERNAL MEDICINE

## 2025-01-02 PROCEDURE — 3008F BODY MASS INDEX DOCD: CPT | Performed by: INTERNAL MEDICINE

## 2025-01-02 PROCEDURE — 93000 ELECTROCARDIOGRAM COMPLETE: CPT | Performed by: INTERNAL MEDICINE

## 2025-01-02 PROCEDURE — 3077F SYST BP >= 140 MM HG: CPT | Performed by: INTERNAL MEDICINE

## 2025-01-02 PROCEDURE — 3080F DIAST BP >= 90 MM HG: CPT | Performed by: INTERNAL MEDICINE

## 2025-01-02 PROCEDURE — 99205 OFFICE O/P NEW HI 60 MIN: CPT | Performed by: INTERNAL MEDICINE

## 2025-01-02 RX ORDER — REGADENOSON 0.08 MG/ML
0.4 INJECTION, SOLUTION INTRAVENOUS
OUTPATIENT
Start: 2025-01-02

## 2025-01-02 RX ORDER — AMINOPHYLLINE 25 MG/ML
125 INJECTION, SOLUTION INTRAVENOUS ONCE AS NEEDED
OUTPATIENT
Start: 2025-01-02

## 2025-01-02 NOTE — PROGRESS NOTES
Patient presents for cardiovascular evaluation in the [facility] at the request of Christopher D'Amico, DO for the following:    Chief Complaint:  Preop Cardiac Clearance.     HISTORY OF PRESENT ILLNESS:      Bettina Kowalski is a 64 y.o. female with prior cardiac history of chest pain, risk factors of HTN, HPL, pre-DM2.  Other pertinent medical history includes needs orthopedic surgery.    Patient reports currently not having any symptoms.  She is complaining of orthopedic pain.  Several months ago patient is listed in chart as having complained to PCP of exertional chest pain and stress test was ordered but not completed.    Patient decribes it was substernal chest pain with no radiation, lasting for minutes, aggravating factors are exertion, alleviating factors are rest.  The chest pain is associated with no other symptoms.      Patient reports experiencing no weight gain, with no change over the past year.  The patient also reports no dyspnea on exertion, no orthopnea, no nocturnal dyspnea, and no lower extremity edema.     No palpitations, syncope, or claudication.  No family history of CAD.  No tobacco smoking.    Past Medical History:  Past Medical History:   Diagnosis Date    Anemia     Asthma     stable    B12 deficiency     Cataract, bilateral     Cerebral herniation (Multi)     Chronic insomnia     Depression     History of blood transfusion 1984    History of Chiari malformation     stable no symptoms    History of prediabetes     Hyperlipidemia     stable without medication    Hypertension     stable without medication    Multinodular goiter     presently no medication    Obesity     Pelvic floor weakness     Prediabetes     Primary osteoarthritis of right hip     Plan: Right Hip Total Arthroplasty Anterior Approach 1/7/25    Right lumbar radiculopathy     Seropositive rheumatoid arthritis         Past Surgical History:  Recent Surgeries in Cardiology            No cases to display              Social  "History:   reports that she quit smoking about 40 years ago. Her smoking use included cigarettes. She started smoking about 50 years ago. She has a 20 pack-year smoking history. She has never used smokeless tobacco. She reports that she does not drink alcohol and does not use drugs.     Family History:  family history includes Breast cancer (age of onset: 74) in her mother's sister; Coronary artery disease in her maternal grandmother; Diabetes in her maternal grandmother; Stroke in her mother; Transient ischemic attack in her mother.     Allergies:  Gabapentin and Metronidazole    Outpatient Medications:  Current Outpatient Medications   Medication Instructions    acetaminophen (TYLENOL) 650 mg, Every 8 hours PRN    albuterol 90 mcg/actuation inhaler 2 puffs, inhalation, Every 4 hours PRN    aspirin 81 mg, oral, Daily    BD Ultra-Fine Mini Pen Needle 31 gauge x 3/16\" needle     chlorhexidine (Peridex) 0.12 % solution 15 ml swish and spit for 30 seconds night prior to surgery and morning of surgery    cranberry extract 500 mg capsule 1 capsule, Daily    DIETARY SUPPLEMENT ORAL 1 Dose, 2 times daily    docosahexaenoic acid/epa (FISH OIL ORAL) 1 Dose, Daily    fluticasone (Flonase) 50 mcg/actuation nasal spray 1 spray, As needed    metFORMIN (GLUCOPHAGE) 500 mg, oral, 2 times daily (morning and late afternoon)    methocarbamol (ROBAXIN) 500 mg, oral, 3 times daily PRN    multivitamin tablet 1 tablet, Daily    naproxen (NAPROSYN) 375 mg, 2 times daily (morning and late afternoon)    NON FORMULARY Disability parking placard needed for permanent lifetime orthopaedic disability.    rosuvastatin (CRESTOR) 20 mg, oral, Daily       ROS: 12 review of systems negative other than chief complaint    PHYSICAL EXAM    Vitals:    01/02/25 0900   BP: (!) 160/92   BP Location: Left arm   Patient Position: Sitting   Pulse: 71   Weight: 85.3 kg (188 lb)   Height: 1.626 m (5' 4\")     General: No acute distress, appears " comfortable  Cardiac: Regular rate and rhythm with no murmurs rubs or gallops, normal S1-S2  Pulmonary: Clear to auscultation bilaterally with no rales or rhonchi, normal respiratory effort  Gastrointestinal: Soft abdomen with no tenderness or guarding, no rebound  Musculoskeletal: No lower extremity edema, normal  Neurological: Alert and oriented x 4, appropriate, moving all extremities well, normal affect  Psychiatric: Normal affect, mood appropriate to occasion  Skin: No rashes, hives or jaundice  HEENT: Normocephalic, atraumatic.  Pupils equal round and reactive.    Last Labs:    CBC -  Lab Results   Component Value Date    WBC 6.4 12/27/2024    HGB 12.8 12/27/2024    HCT 39.4 12/27/2024    MCV 88 12/27/2024     12/27/2024       CMP -  Lab Results   Component Value Date    CALCIUM 10.2 12/27/2024    PHOS 2.9 11/10/2021    PROT 7.7 10/31/2024    ALBUMIN 4.3 11/18/2024    AST 19 10/31/2024    ALT 16 10/31/2024    ALKPHOS 102 10/31/2024    BILITOT 0.8 10/31/2024       LIPID PANEL -   Lab Results   Component Value Date    CHOL 231 (H) 10/31/2024    HDL 66.8 10/31/2024    CHHDL 3.5 10/31/2024    VLDL 25 10/31/2024    TRIG 123 10/31/2024    NHDL 164 (H) 10/31/2024       RENAL FUNCTION PANEL -   Lab Results   Component Value Date    K 4.1 12/27/2024    PHOS 2.9 11/10/2021       Lab Results   Component Value Date    BNP 27 05/05/2021    HGBA1C 5.7 (H) 10/31/2024       Last Cardiology Tests: EKG NSR nonspecific STTW abnormality      Assessment/Plan   Problem List Items Addressed This Visit             ICD-10-CM       Cardiac and Vasculature    Benign hypertension I10    Chest pain R07.9    Relevant Orders    ECG 12 lead (Clinic Performed)    Transthoracic echo (TTE) complete    Nuclear Stress Test    Hyperlipidemia E78.5     Other Visit Diagnoses         Codes    Abnormal EKG    -  Primary R94.31    Relevant Orders    ECG 12 lead (Clinic Performed)    Transthoracic echo (TTE) complete    Nuclear Stress Test     Preoperative cardiovascular examination     Z01.810    Relevant Orders    ECG 12 lead (Clinic Performed)    Transthoracic echo (TTE) complete    Nuclear Stress Test            Assessment and plan (narrative):    Bettina Kowalski is a 64 y.o. female with cardiovascular risk factors of HTN, currently uncontrolled, HPL on crestor, impaired fasting glucose/DM2 not on meds., and rheumatoid arthritis  History of chest pain but stress not completed.  EKG is abnormal and dynamic changes from previous are present.  The patient cannot currently exercise 4 METS due to orthopedic pain.  Long discussion, patient very fixated on having surgery immediately, but patient is not clear for surgery without some cardiac ischemia eval, which she has not had previously.      Followup: after testing, although patient can be cleared before then if stress is normal.    Moses Inman MD

## 2025-01-03 ENCOUNTER — TELEPHONE (OUTPATIENT)
Dept: CARDIOLOGY | Facility: CLINIC | Age: 65
End: 2025-01-03

## 2025-01-03 NOTE — TELEPHONE ENCOUNTER
Patient's surgeon was requesting the office visit note but is not completed yet.     Patient include that she looked over her mother's and grandmother's death certificate and there was no evidence of heart issues.

## 2025-01-07 ENCOUNTER — TRANSCRIBE ORDERS (OUTPATIENT)
Dept: OPERATING ROOM | Facility: HOSPITAL | Age: 65
End: 2025-01-07
Payer: MEDICARE

## 2025-01-07 DIAGNOSIS — M16.11 PRIMARY OSTEOARTHRITIS OF RIGHT HIP: Primary | ICD-10-CM

## 2025-01-13 ENCOUNTER — HOSPITAL ENCOUNTER (OUTPATIENT)
Dept: CARDIOLOGY | Facility: HOSPITAL | Age: 65
Discharge: HOME | End: 2025-01-13
Payer: MEDICARE

## 2025-01-13 ENCOUNTER — HOSPITAL ENCOUNTER (OUTPATIENT)
Dept: RADIOLOGY | Facility: HOSPITAL | Age: 65
Discharge: HOME | End: 2025-01-13
Payer: MEDICARE

## 2025-01-13 DIAGNOSIS — R94.31 ABNORMAL EKG: ICD-10-CM

## 2025-01-13 DIAGNOSIS — R07.9 CHEST PAIN, UNSPECIFIED TYPE: ICD-10-CM

## 2025-01-13 DIAGNOSIS — Z01.810 PREOPERATIVE CARDIOVASCULAR EXAMINATION: ICD-10-CM

## 2025-01-13 LAB
AORTIC VALVE MEAN GRADIENT: 4 MMHG
AORTIC VALVE PEAK VELOCITY: 1.55 M/S
AV PEAK GRADIENT: 10 MMHG
AVA (PEAK VEL): 2.5 CM2
AVA (VTI): 2.44 CM2
EJECTION FRACTION APICAL 4 CHAMBER: 61
EJECTION FRACTION: 68 %
LEFT ATRIUM VOLUME AREA LENGTH INDEX BSA: 29.7 ML/M2
LEFT VENTRICLE INTERNAL DIMENSION DIASTOLE: 4.01 CM (ref 3.5–6)
LEFT VENTRICULAR OUTFLOW TRACT DIAMETER: 2.04 CM
MITRAL VALVE E/A RATIO: 0.75
RIGHT VENTRICLE FREE WALL PEAK S': 16 CM/S
RIGHT VENTRICLE PEAK SYSTOLIC PRESSURE: 25.5 MMHG
TRICUSPID ANNULAR PLANE SYSTOLIC EXCURSION: 2.2 CM

## 2025-01-13 PROCEDURE — 93306 TTE W/DOPPLER COMPLETE: CPT

## 2025-01-13 PROCEDURE — 93016 CV STRESS TEST SUPVJ ONLY: CPT | Performed by: INTERNAL MEDICINE

## 2025-01-13 PROCEDURE — A9502 TC99M TETROFOSMIN: HCPCS | Performed by: INTERNAL MEDICINE

## 2025-01-13 PROCEDURE — 2500000004 HC RX 250 GENERAL PHARMACY W/ HCPCS (ALT 636 FOR OP/ED): Performed by: INTERNAL MEDICINE

## 2025-01-13 PROCEDURE — 93306 TTE W/DOPPLER COMPLETE: CPT | Performed by: INTERNAL MEDICINE

## 2025-01-13 PROCEDURE — 3430000001 HC RX 343 DIAGNOSTIC RADIOPHARMACEUTICALS: Performed by: INTERNAL MEDICINE

## 2025-01-13 PROCEDURE — 78452 HT MUSCLE IMAGE SPECT MULT: CPT

## 2025-01-13 PROCEDURE — 78452 HT MUSCLE IMAGE SPECT MULT: CPT | Performed by: STUDENT IN AN ORGANIZED HEALTH CARE EDUCATION/TRAINING PROGRAM

## 2025-01-13 PROCEDURE — 93018 CV STRESS TEST I&R ONLY: CPT | Performed by: INTERNAL MEDICINE

## 2025-01-13 PROCEDURE — 93017 CV STRESS TEST TRACING ONLY: CPT

## 2025-01-13 RX ORDER — REGADENOSON 0.08 MG/ML
0.4 INJECTION, SOLUTION INTRAVENOUS
Status: COMPLETED | OUTPATIENT
Start: 2025-01-13 | End: 2025-01-13

## 2025-01-13 RX ADMIN — TETROFOSMIN 36 MILLICURIE: 0.23 INJECTION, POWDER, LYOPHILIZED, FOR SOLUTION INTRAVENOUS at 13:39

## 2025-01-13 RX ADMIN — TETROFOSMIN 10.8 MILLICURIE: 0.23 INJECTION, POWDER, LYOPHILIZED, FOR SOLUTION INTRAVENOUS at 11:10

## 2025-01-13 RX ADMIN — REGADENOSON 0.4 MG: 0.08 INJECTION, SOLUTION INTRAVENOUS at 13:41

## 2025-01-13 NOTE — NURSING NOTE
Patient present at appointment but had to cancel due to pain and inability to lay flat on Nuclear Medicine table.

## 2025-01-20 ENCOUNTER — TELEPHONE (OUTPATIENT)
Dept: CARDIOLOGY | Facility: CLINIC | Age: 65
End: 2025-01-20
Payer: MEDICARE

## 2025-01-23 DIAGNOSIS — M25.551 RIGHT HIP PAIN: Primary | ICD-10-CM

## 2025-01-25 ENCOUNTER — HOSPITAL ENCOUNTER (OUTPATIENT)
Dept: RADIOLOGY | Facility: HOSPITAL | Age: 65
Discharge: HOME | End: 2025-01-25
Payer: MEDICARE

## 2025-01-25 DIAGNOSIS — M25.551 RIGHT HIP PAIN: ICD-10-CM

## 2025-01-25 PROCEDURE — 73502 X-RAY EXAM HIP UNI 2-3 VIEWS: CPT | Mod: RIGHT SIDE | Performed by: RADIOLOGY

## 2025-01-25 PROCEDURE — 73502 X-RAY EXAM HIP UNI 2-3 VIEWS: CPT | Mod: RT

## 2025-01-29 PROBLEM — Z96.641 HISTORY OF TOTAL HIP ARTHROPLASTY, RIGHT: Status: ACTIVE | Noted: 2025-01-29

## 2025-01-29 RX ORDER — POLYETHYLENE GLYCOL 3350 17 G/17G
17 POWDER, FOR SOLUTION ORAL DAILY
Qty: 510 G | Refills: 0 | Status: SHIPPED | OUTPATIENT
Start: 2025-01-29 | End: 2025-03-09

## 2025-01-29 RX ORDER — TRANEXAMIC ACID 650 MG/1
1950 TABLET ORAL ONCE
Status: CANCELLED | OUTPATIENT
Start: 2025-01-29 | End: 2025-01-29

## 2025-01-29 RX ORDER — CEFAZOLIN SODIUM 2 G/100ML
2 INJECTION, SOLUTION INTRAVENOUS ONCE
Status: CANCELLED | OUTPATIENT
Start: 2025-01-29 | End: 2025-01-29

## 2025-01-29 RX ORDER — OXYCODONE HYDROCHLORIDE 5 MG/1
5 TABLET ORAL EVERY 6 HOURS PRN
Qty: 28 TABLET | Refills: 0 | Status: SHIPPED | OUTPATIENT
Start: 2025-01-29 | End: 2025-02-14

## 2025-01-29 RX ORDER — ACETAMINOPHEN 500 MG/1
1000 CAPSULE, LIQUID FILLED ORAL EVERY 8 HOURS PRN
Qty: 90 CAPSULE | Refills: 0 | Status: SHIPPED | OUTPATIENT
Start: 2025-01-29 | End: 2025-02-28

## 2025-01-29 RX ORDER — DEXAMETHASONE SODIUM PHOSPHATE 10 MG/ML
10 INJECTION INTRAMUSCULAR; INTRAVENOUS ONCE
Status: CANCELLED | OUTPATIENT
Start: 2025-01-29 | End: 2025-01-29

## 2025-01-29 RX ORDER — TRAMADOL HYDROCHLORIDE 50 MG/1
50 TABLET ORAL EVERY 6 HOURS PRN
Qty: 28 TABLET | Refills: 0 | Status: SHIPPED | OUTPATIENT
Start: 2025-01-29 | End: 2025-02-14

## 2025-01-29 RX ORDER — DOCUSATE SODIUM 100 MG/1
100 CAPSULE, LIQUID FILLED ORAL 2 TIMES DAILY
Qty: 60 CAPSULE | Refills: 0 | Status: SHIPPED | OUTPATIENT
Start: 2025-01-29 | End: 2025-03-09

## 2025-01-29 RX ORDER — CEFADROXIL 500 MG/1
500 CAPSULE ORAL 2 TIMES DAILY
Qty: 14 CAPSULE | Refills: 0 | Status: SHIPPED | OUTPATIENT
Start: 2025-01-29 | End: 2025-01-31 | Stop reason: WASHOUT

## 2025-01-29 RX ORDER — CELECOXIB 200 MG/1
200 CAPSULE ORAL 2 TIMES DAILY
Qty: 56 CAPSULE | Refills: 0 | Status: SHIPPED | OUTPATIENT
Start: 2025-01-29 | End: 2025-03-07

## 2025-01-29 RX ORDER — ASPIRIN 81 MG/1
81 TABLET ORAL 2 TIMES DAILY
Qty: 84 TABLET | Refills: 0 | Status: SHIPPED | OUTPATIENT
Start: 2025-01-29 | End: 2025-03-21

## 2025-01-29 RX ORDER — PANTOPRAZOLE SODIUM 40 MG/1
40 TABLET, DELAYED RELEASE ORAL
Qty: 42 TABLET | Refills: 0 | Status: SHIPPED | OUTPATIENT
Start: 2025-01-29 | End: 2025-03-21

## 2025-01-29 NOTE — DISCHARGE INSTRUCTIONS
Beltran Platt MD  Adult Reconstruction and Joint Replacement  Office: [facility] (Phone [phone], Fax [phone], or [employer])      Thank you for choosing Dr. Platt for your joint replacement surgery. Below you will find instructions for after surgery in a variety of topics with answers to frequently asked questions. These instructions apply to most patients, but those with complex surgical or medical issues may vary. Please carefully read them and contact our office with questions.    Communication Methods  MyChart will continue to be the best way for us to communicate, but you may also call the office if you prefer.    Your Team and Whom to Contact  Dr. Platt's team is available to answer your questions. The best person to contact depends on your question and surgery location.  [facility] (contact information above): Scheduling office visits, prescription refills, medical concerns, leave of Absence, FMLA, or other paperwork.  Nursing Coordinators (If your surgery was at [facility]: Oc Robles ([phone]), Torrie Gregg BSN-BC ([phone])). If your surgery was at [facility]: Traci Roy MBA, RN-BC ([phone])): Nursing or wound care questions within 6 weeks after surgery, prescription for outpatient physical therapy.  Alexus Gilman ([phone], [phone]) or Ashanti Austin ([phone]): Durable medical equipment (walker, braces, elevated toilet seat, etc.)    FMLA, Disability, and Return to Work Forms  Please submit all forms directly to [facility] (contact information above). Do not leave paperwork with the clinic staff.    Home Arrangements  For the first 3-5 days and nights after surgery, you will need a care partner (friend or family) with you at home. Alternatively, you may stay with friends or family. Research has shown that you will recover much better at home than at rehab.    Physical Therapy  All Patients  Physical therapy will begin in your home within  1-2 days after discharge. The care coordinator during your surgical stay should have arranged these services with a company of your choice before you were discharged from the hospital. Until starting home therapy, continue the exercises in your preoperative paperwork.  Depending on your condition, your team may have provided a knee immobilizer (stiff brace that keeps your leg straight). Wear this when ambulating until you feel your leg is strong enough to be safe without it.  Hip Replacements: Additional physical therapy outside your home is not required. In most cases, simply walking and gradually returning to activities is all that is needed. If you would like additional therapy, we are happy to prescribe it.  Knee Replacements: Additional physical therapy outside your home is required and should start 2-3 weeks after surgery.  Call the physical therapy location of your choice the day after you are discharged from the hospital to schedule the appointment well ahead of time and ensure there are no delays in starting outpatient therapy.    Clinic Appointments  2-3 weeks after surgery: If you have any skin stitches or staples, they will likely be removed at this time. This appointment should have been scheduled before your surgery.  6 weeks after surgery: X-ray  1 year after surgery: X-rays  Every 5 years after surgery (5, 10, 15…): X-rays    Restarting Your Home Diet and Medications  Resume your normal diet when you return home. Be sure to drink plenty of water and eat a substantial amount of protein (to help your body heal and rebuild) and fiber (to promote good bowel habits and prevent constipation).  Constipation is common from a combination of inactivity, anesthesia, and pain medication. In addition to the dietary recommendations above, physical activity such as walking also helps stimulate your bowels.   The PAT will advise you on restarting your medications. See your discharge paperwork for more  details.    Goals and Limitations  Being aware of goals and limitations will help you take great care of your new joint. The below guidelines are for the first 3 months after surgery. After that time, please discuss increasing activities with the team.  All Patients  Unless the team tells you otherwise, you may put all of your weight on your new joint.  Activities to avoid: Sharp pivots, deep squats, jolting impact on your joint (running, jumping), and crossing your legs (Both lying and sitting. Have a pillow between your legs when sleeping on your side or rolling over.)  Knee Replacements:   Range of Motion Goal: Minimum of 90 degrees with eventual progression to as much as 120 with continued exercise and time.  Activities to avoid: Kneeling. NEVER place a pillow/blanket under your knee when sitting or lying. Although this position is comfortable, it allows your knee to rest flexed. In the long term, this can lead to an inability to straighten your leg all the way, which is a very difficult problem to treat. Instead, keep your knee completely straight by placing a pillow or blanket under your heel.  Hip Replacements:  Anterior hip replacement activities to avoid: Large steps backwards, pointing your toes away from the middle of your body, lying on your stomach, and stretching/arching your back  Posterior hip replacement activities to avoid: Bending forward at the waist to  items or put on shoes/socks/underwear (instead have your arms between your legs and bend at your knees), bending your hip more than 90 degrees when getting on/off of low chairs, toilets, and sofas (make sure you knees are always below the level of your hip and use your arms to help you up instead of leaning forward), and pointing your toes inwards    Wound Care and Personal Hygiene  Your rubber bandage is waterproof. You may shower with it on. Your home therapst will remove it 1 week after surgery. Depending on the patient:  The therapist  will leave it off. You can continue to shower, letting water run over it (unless you have staples or black stiches; see below) and lightly cleaning with regular soap (do not scrub).  The therapist will replace it once per week with a similar bandage until your first clinic appointment  Do not bath, swim, or soak your incision until your team tells you it is okay (typically at least 6 weeks after surgery).  If you have continued drainage or bleeding, send the team a picture through [employer] with a description of the drainage (color/odor.amount) and whether you are having any other symptoms (fever/nausea/vomit). If you had a knee replacement, wrap your leg with an ace wrap.  If you have Steri-Strips (small white pieces of tape) across your incision, leave the strips in place until they fall off their own.   If you have staples or stiches outside your skin, they will be removed at your 2 week follow-up appointment. These should not get wet, so cover them with a waterproof dressing when showering.  You may see small clear stitch tails sticking out of the incision. Do not cut or pick them. Cover them with a bandage until they dissolve on their own.  Do not apply lotions/creams/ointments to the incision until the team tells you it is okay (typically at least 8 weeks after surgery). Once approved, you may use vitamin E on the skin.    Pain, Swelling, Bruising, Numbness  Pain and swelling are normal for up to one year and can increase with excess activity or exercise. They can be reduced with rest, ice, compression stockings, leg elevation, and the prescribed pain medication regimen.  Wear the provided compression stockings during the day for one month after surgery, removing at night since your legs are elevated.  Bruising is normal for several weeks and will gradually subside.  Numbness or tingling in the skin surrounding the incision is common. Normal sensation may or may not return.  In general, it is common for the  surgical area to not return completely to the way it was before surgery.    Clicking  Depending on the type of implant used in your joint, clicking with motion can be normal and is a result of the mechanical parts replacing the function of your original joint.    Driving and Travel  To help prevent blood clots, you should wait at least 6weeks before traveling long distances after surgery. If traveling by car, stop and walk around every hour. If traveling by plane, walk the aisle every hour and perform ankle pumps while seated.  When it is safe to return to driving after surgery is at your discretion. The primary concern is the ability to quickly and firmly press the brake pedal. At a minimum, you must be off all narcotic pain medications and able to walk with a cane.    Ice and Cold Therapy  All patients should use ice for at least the first 6 weeks after surgery to help with pain and swelling.  Do not place ice directly on skin. Always have a layer of protection (clothing, towel) between them.   If using ice/gel packs, alternate 20 minutes on/off.  If using an ice machine or cold therapy device, follow the usage and cleaning instructions provided with it. Typically, you do not need to take breaks like you do with ice/gel packs. You should detach the pad from the cooler, check your skin under the pad, and ambulate at least once every hour. You may wear the cold therapy pad to sleep. Empty the cooler and pad when not using the device.     Emergencies  Reasons to call the office:  24 hours of fever above 101 F.  Excruciating pain not helped even when following the pain medication instructions.  Increased swelling not helped with rest, elevation, and ice.  New calf pain that is warm and tender to touch.  You have excessive bleeding from the incision that will not slow down. A small amount of drainage is normal and expected. When this occurs, apply pressure and cover the wound, only checking on it every 4-6 hours so  that pressure stays applied.  Signs of an infection around the incision (excessive drainage soaking through dressing (especially if it is colored/malodorous), redness spreading out from the edges of your incision, or significantly increased warmth around the area (some warmth is normal)).  Reason to go directly to the emergency room or call 911:  You experience chest pain or difficulty breathing.    Travel Program  If you are part of the travel program ([employer], [employer], [employer] employees), follow the same above instructions. The one difference is that you need to see your surgeon in clinic 1 week before traveling home. For questions, contact Gay Murry RN ( [phone]).    Discharge Medication Instructions  Carefully follow instructions  Many problems that patients develop after discharge are a result of not taking the proper medications at the proper time. Please read and carefully follow the below instructions and pharmacy labels to maximize your successful recovery.  Medication refills  Only Ultram and oxycodone are eligible for refills. The government strictly controls these medications. In special circumstances, refills may be provided once every 7 days for up to 6 weeks after surgery.  Refill requests are only processed Monday through Friday before 1pm. If you anticipate needing a refill, contact the office several days before to account for this schedule. If you leave a message, specify your preferred pharmacy location.  You will not receive a return call stating your prescription is refilled. Please contact your pharmacy to confirm it is available.  The other medications will only be prescribed once when you leave the hospital.  Side effects  Side effects are common, especially with tramadol and oxycodone. Please read the pharmacy packages carefully.    Discharge Medication List  Pain medication: Treat pain medications like building blocks. Always start by taking the medications at the bottom of the stack.  If your pain is uncontrolled, continue taking those bottom medications and add a higher block. When your pain is controlled, take down the stack by removing the higher blocks first.   Bottom blocks (for mild pain)  Acetaminophen (Tylenol): If you have liver problems, please consult with your primary care physician regarding the dose.  Pregabalin (Lyrica), meloxicam (Mobic), and celecoxib (Celebrex): Do not be alarmed if you do not receive a prescription for these medications. They cannot be taken with certain medical conditions or ages, so your team may not have provided them.  Middle block: Tramadol (Ultram) (for moderate pain)  Your goal is to be completely off of this medication 1-2 weeks after surgery.  Do not take this medication at the same time as oxycodone.  Do not operate a motor vehicle while taking this medication.  Top block: Oxycodone (for severe pain)  Same instructions as tramadol.  Do not take this medication at the same time as tramadol.    Blood thinner  This medication is to prevent blood clots from forming in your legs or lungs. Take it as prescribed for the entire duration.  The specific medication prescribed to you depends on a variety of factors. Possibilities include a medication you were on before surgery, aspirin, Eliquis, Xarelto, Lovenox, Warfarin, or some combination thereof.  Note: If you are prescribed aspirin, it is very important that you take it for the entire prescription duration. Do not stop taking it if your pain is under control. This prescription is to prevent blood clots, not to control pain.  Anti-nausea (Proton pump inhibitor (PPI))  This medication is to prevent nausea and protect your stomach from ulcers while taking the other prescribed medications. Take it as prescribed for the entire duration.  If you were already on a PPI before surgery, you will resume that medication. If not, you will be provided a prescription for pantoprazole (Protonix).    Anti-constipation  Constipation is a common side effect of tramadol and oxycodone.  You have been provided prescriptions for stool softeners (docusate sodium (Colace) and polyethylene glycol (Miralax)). Take both of these medications while taking tramadol and oxycodone to help minimize constipation.  If you continue to have constipation despite taking these two medications, you may add over the counter bisacodyl (Dulcolax) suppositories.     Example Medication Schedules  Since you will be taking multiple medications, patients have found the below example schedules to be helpful when weaning off pain medication. They do not show your blood thinner since that will vary by patient.  Step 1: When you first return home and have the most pain  All the time: Ice/cold therapy  3am: Pain (Ultram)  6am: Pain (oxycodone), Constipation (Miralax)  8am: Pain (Tylenol)  9am: Pain (Ultram (and Mobic/Celebrex/Lyrica for some patients)), PPI (Protonix), Constipation (Colace)  Noon: Pain (oxycodone)  3pm: Pain (Ultram)  4pm: Pain (Tylenol)  6pm: Pain (oxycodone), Constipation (Miralax)  9pm: Pain (Ultram (and Celebrex/Lyrica for some patients)), constipation (Colace)  Midnight: Pain (Tylenol, oxycodone)  Step 2: As your pain begins to decrease, you can space out your pain medications  All the time: Ice/cold therapy  4am: Pain (oxycodone), Constipation (Colace)  8am: Pain (Tylenol, Ultram (and Mobic/Celebrex/Lyrica for some patients)), PPI (Protonix)  Noon: Pain (oxycodone), Constipation (Miralax)  4pm: Pain (Tylenol, Ultram), Constipation (Colace)  8pm: Pain (oxycodone (and Celebrex/Lyrica for some patients))  Midnight: Pain (Tylenol, Ultram)  Step 3: As your pain continues to decrease, you can remove the top block of your pain pyramid (oxycodone)  All the time: Ice/cold therapy  4am: Pain (Tylenol), Constipation (Colace)  8am: Pain (Ultram (and Mobic/Celebrex/Lyrica for some patients)), PPI (Protonix)  Noon: Pain (Tylenol),  Constipation (Miralax)  4pm: Pain (Ultram), Constipation (Colace)  8pm: Pain (Tylenol (and Celebrex/Lyrica for some patients))  Midnight: Pain (Ultram)  Step 4: As your pain continues to decrease, you can remove the middle block of your pain pyramid (Ultram) and the constipation medication  All the time: Ice/cold therapy  4am: Pain (Tylenol)  Noon: Pain (Tylenol (and Mobic/Celebrex/Lyrica for some patients)), PPI (Protonix)  8pm: Pain (Tylenol)  Midnight: Pain (Celebrex/Lyrica for some patients)

## 2025-01-29 NOTE — NURSING NOTE
Called patient to discuss upcoming surgery. Confirmed that the plan is to be determined in regards to same day discharge vs overnight stay. The patient has obtained their medical equipment. The patient does have a care partner to assist them at home postoperatively. They live in a house.  The patient does have stairs required for navigating the home. The patient currently does not use an assistive device. Reminded patient to follow PAT instructions leading up to surgery and to watch for a phone call from preop the day prior to their surgery to receive details about arrival time. All questions answered at this time.

## 2025-01-30 ENCOUNTER — HOSPITAL ENCOUNTER (OUTPATIENT)
Dept: RADIOLOGY | Facility: CLINIC | Age: 65
Discharge: HOME | End: 2025-01-30
Payer: MEDICARE

## 2025-01-30 DIAGNOSIS — M25.551 RIGHT HIP PAIN: ICD-10-CM

## 2025-01-30 DIAGNOSIS — M24.7 PROTRUSIO ACETABULI: ICD-10-CM

## 2025-01-30 DIAGNOSIS — M25.552 LEFT HIP PAIN: ICD-10-CM

## 2025-01-30 PROCEDURE — 72192 CT PELVIS W/O DYE: CPT

## 2025-01-31 ENCOUNTER — CLINICAL SUPPORT (OUTPATIENT)
Dept: PREADMISSION TESTING | Facility: HOSPITAL | Age: 65
End: 2025-01-31
Payer: MEDICARE

## 2025-01-31 NOTE — CPM/PAT NURSE NOTE
CPM/PAT Nurse Note      Name: Bettina CASANOVA Dex (Bettina CASANOVA Dex)  /Age: 1960/64 y.o.       Past Medical History:   Diagnosis Date    Anemia     Asthma     stable    B12 deficiency     Cataract, bilateral     Cerebral herniation (Multi)     Chronic insomnia     Depression     History of blood transfusion     History of Chiari malformation     stable no symptoms    History of prediabetes     Hyperlipidemia     stable without medication    Hypertension     stable without medication    Multinodular goiter     presently no medication    Obesity     Pelvic floor weakness     Prediabetes     Primary osteoarthritis of right hip     Plan: Right Hip Total Arthroplasty Anterior Approach 25    Right lumbar radiculopathy     Seropositive rheumatoid arthritis        Past Surgical History:   Procedure Laterality Date    COLONOSCOPY      HYSTERECTOMY  2009    LUMBAR FUSION      OTHER SURGICAL HISTORY      Skull Excision Subfascial Soft Tissue Tumor    TOTAL KNEE ARTHROPLASTY Left     TRANSOBTURATOR SLING         Patient Sexual activity questions deferred to the physician.    Family History   Problem Relation Name Age of Onset    Stroke Mother      Transient ischemic attack Mother      Breast cancer Mother's Sister  74    Diabetes Maternal Grandmother      Coronary artery disease Maternal Grandmother         Allergies   Allergen Reactions    Gabapentin Hallucinations    Metronidazole Hives       Prior to Admission medications    Medication Sig Start Date End Date Taking? Authorizing Provider   acetaminophen (Tylenol) 500 mg capsule Take 2 capsules (1,000 mg) by mouth every 8 hours if needed for mild pain (1 - 3). For the first 2 weeks after surgery, take every 8 hours. After that, take every 8 hours only if needed. 25  Beltran Platt MD   albuterol 90 mcg/actuation inhaler Inhale 2 puffs every 4 hours if needed for wheezing. 10/31/24   Christopher D'Amico,    aspirin 81 mg EC tablet Take 1 tablet (81  "mg) by mouth 2 times a day. 1/29/25 3/12/25  Beltran Platt MD   BD Ultra-Fine Mini Pen Needle 31 gauge x 3/16\" needle  9/29/23   Historical Provider, MD   celecoxib (CeleBREX) 200 mg capsule Take 1 capsule (200 mg) by mouth 2 times a day. For the first 4 weeks after surgery, take twice per day every day.  Patient not taking: Reported on 1/31/2025 1/29/25 2/28/25  Beltran Platt MD   docusate sodium (Colace) 100 mg capsule Take 1 capsule (100 mg) by mouth 2 times a day. Take the entire time you are taking tramadol and oxycodone. After that, you may stop. 1/29/25 2/28/25  Beltran Platt MD   metFORMIN (Glucophage) 500 mg tablet Take 1 tablet (500 mg) by mouth 2 times daily (morning and late afternoon).  Patient not taking: Reported on 1/31/2025 10/31/24   Christopher D'Amico,    methocarbamol (Robaxin) 500 mg tablet Take 1 tablet (500 mg) by mouth 3 times a day as needed for muscle spasms for up to 10 days. 12/14/24 12/27/24  Shawn Hernandez MD   NON FORMULARY Disability parking placard needed for permanent lifetime orthopaedic disability. 10/25/21   Historical Provider, MD   oxyCODONE (Roxicodone) 5 mg immediate release tablet Take 1 tablet (5 mg) by mouth every 6 hours if needed for severe pain (7 - 10) for up to 7 days. 1/29/25 2/5/25  Beltran Platt MD   pantoprazole (ProtoNix) 40 mg EC tablet Take 1 tablet (40 mg) by mouth once daily in the morning. Take before meals. Do not crush, chew, or split. Take the entire time you are taking your blood thinning and pain medications. After that, you may stop. 1/29/25 3/12/25  Beltran Platt MD   polyethylene glycol (Glycolax, Miralax) 17 gram/dose powder Mix 17 g of powder and drink once daily. Mix 1 cap (17g) into 8 ounces of fluid. Take the entire time you are taking tramadol and oxycodone. After that, you may stop. 1/29/25 2/28/25  Beltran Platt MD   rosuvastatin (Crestor) 20 mg tablet Take 1 tablet (20 mg) by mouth once daily. 11/1/24 1/30/25  Cuco" D'Amico, DO   traMADol (Ultram) 50 mg tablet Take 1 tablet (50 mg) by mouth every 6 hours if needed for moderate pain (4 - 6) for up to 7 days. 1/29/25 2/5/25  Beltran Platt MD   acetaminophen (Tylenol) 325 mg tablet Take 2 tablets (650 mg) by mouth every 8 hours if needed for mild pain (1 - 3).  1/29/25  Historical Provider, MD   aspirin 81 mg chewable tablet Chew 1 tablet (81 mg) once daily. 10/31/24 1/29/25  Christopher D'Amico, DO   cefadroxil (Duricef) 500 mg capsule Take 1 capsule (500 mg) by mouth 2 times a day for 7 days. 1/29/25 1/31/25  Beltran Platt MD   chlorhexidine (Peridex) 0.12 % solution 15 ml swish and spit for 30 seconds night prior to surgery and morning of surgery 12/27/24 1/29/25  Neida Fulton PA-C   cranberry extract 500 mg capsule Take 1 capsule by mouth once daily.  1/31/25  Historical Provider, MD   DIETARY SUPPLEMENT ORAL Take 1 Dose by mouth 2 times a day. Sciatica support  1/31/25  Historical Provider, MD   docosahexaenoic acid/epa (FISH OIL ORAL) Take 1 Dose by mouth once daily.  1/31/25  Historical Provider, MD   fluticasone (Flonase) 50 mcg/actuation nasal spray Administer 1 spray into each nostril if needed for rhinitis or allergies. Shake gently. Before first use, prime pump. After use, clean tip and replace cap.  1/31/25  Historical Provider, MD   multivitamin tablet Take 1 tablet by mouth once daily.  1/31/25  Historical Provider, MD   naproxen (Naprosyn) 375 mg tablet Take 1 tablet (375 mg) by mouth 2 times daily (morning and late afternoon).  1/29/25  Historical Provider, MD BOLA FLORENTINO     DASI Risk Score      Flowsheet Row Questionnaire Series Submission from 1/28/2025 in [facility] with Generic Provider Grupo Questionnaire Series Submission from 12/30/2024 in [facility] with Generic Provider Grupo   Can you take care of yourself (eat, dress, bathe, or use toilet)?  2.75  filed at 01/28/2025 1111 2.75  filed at 12/30/2024 1218   Can you walk indoors, such  as around your house? 1.75  filed at 01/28/2025 1111 1.75  filed at 12/30/2024 1218   Can you walk a block or two on level ground?  0  filed at 01/28/2025 1111 0  filed at 12/30/2024 1218   Can you climb a flight of stairs or walk up a hill? 0  filed at 01/28/2025 1111 0  filed at 12/30/2024 1218   Can you run a short distance? 0  filed at 01/28/2025 1111 0  filed at 12/30/2024 1218   Can you do light work around the house like dusting or washing dishes? 2.7  filed at 01/28/2025 1111 2.7  filed at 12/30/2024 1218   Can you do moderate work around the house like vacuuming, sweeping floors or carrying groceries? 0  filed at 01/28/2025 1111 0  filed at 12/30/2024 1218   Can you do heavy work around the house like scrubbing floors or lifting and moving heavy furniture?  0  filed at 01/28/2025 1111 0  filed at 12/30/2024 1218   Can you do yard work like raking leaves, weeding or pushing a mower? 0  filed at 01/28/2025 1111 0  filed at 12/30/2024 1218   Can you have sexual relations? 0  filed at 01/28/2025 1111 0  filed at 12/30/2024 1218   Can you participate in moderate recreational activities like golf, bowling, dancing, doubles tennis or throwing a baseball or football? 0  filed at 01/28/2025 1111 0  filed at 12/30/2024 1218   Can you participate in strenous sports like swimming, singles tennis, football, basketball, or skiing? 0  filed at 01/28/2025 1111 0  filed at 12/30/2024 1218   DASI SCORE 7.2  filed at 01/28/2025 1111 7.2  filed at 12/30/2024 1218   METS Score (Will be calculated only when all the questions are answered) 3.6  filed at 01/28/2025 1111 3.6  filed at 12/30/2024 1218          Caprin DVT Assessment    No data to display       Modified Frailty Index    No data to display       CHADS2 Stroke Risk  Current as of 3 hours ago        N/A 3 to 100%: High Risk   2 to < 3%: Medium Risk   0 to < 2%: Low Risk     Last Change: N/A          This score determines the patient's risk of having a stroke if the  patient has atrial fibrillation.        This score is not applicable to this patient. Components are not calculated.          Revised Cardiac Risk Index    No data to display       Apfel Simplified Score    No data to display       Risk Analysis Index Results This Encounter    No data found in the last 10 encounters.       Stop Bang Score      Flowsheet Row Questionnaire Series Submission from 1/28/2025 in Virtual Care with Generic Provider Mychart Questionnaire Series Submission from 12/30/2024 in Virtual Care with Generic Provider Mychart   Do you snore loudly? 0  filed at 01/28/2025 1111 0  filed at 12/30/2024 1218   Do you often feel tired or fatigued after your sleep? 0  filed at 01/28/2025 1111 0  filed at 12/30/2024 1218   Has anyone ever observed you stop breathing in your sleep? 0  filed at 01/28/2025 1111 0  filed at 12/30/2024 1218   Do you have or are you being treated for high blood pressure? 0  filed at 01/28/2025 1111 0  filed at 12/30/2024 1218   Recent BMI (Calculated) 32.3  filed at 01/28/2025 1111 32.3  filed at 12/30/2024 1218   Is BMI greater than 35 kg/m2? 0=No  filed at 01/28/2025 1111 0=No  filed at 12/30/2024 1218   Age older than 50 years old? 1=Yes  filed at 01/28/2025 1111 1=Yes  filed at 12/30/2024 1218   Gender - Male 0=No  filed at 01/28/2025 1111 0=No  filed at 12/30/2024 1218          Prodigy: High Risk  Total Score: 11              Prodigy Age Score      Prodigy Previous Opioid Use Score           ARISCAT Score for Postoperative Pulmonary Complications      Flowsheet Row Pre-Admission Testing from 12/27/2024 in [facility] with JERRY Bradshaw Calculated Score 3 filed at 12/27/2024 1128   Preoperative SpO2 0 filed at 12/27/2024 1128   Respiratory infection in the last month Either upper or lower (i.e., URI, bronchitis, pneumonia), with fever and antibiotic treatment 0 filed at 12/27/2024 1128   Preoperative anemia (Hgb less than 10 g/dl) 0 filed at  12/27/2024 1128   Surgical incision  0 filed at 12/27/2024 1128   Duration of surgery  23 filed at 12/27/2024 1128   Emergency Procedure  0 filed at 12/27/2024 1128   ARISCAT Total Score  26 filed at 12/27/2024 1128          Foster Perioperative Risk for Myocardial Infarction or Cardiac Arrest (SHIRA)    No data to display         Nurse Plan of Action:     RN screening call complete.  Reviewed allergies, medications and pharmacy.  Resubmit - no repeat PAT needed.  After Visit Summary (AVS) reviewed and patient verbalized good understanding of medications and NPO instructions.  Pre-op infection prevention measures:  CHG showers and mouthwash reviewed, understanding voiced.  Patient has CHG soap and Peridex mouthwash from previous PAT appt.  No further questions.

## 2025-02-03 ENCOUNTER — APPOINTMENT (OUTPATIENT)
Dept: ORTHOPEDIC SURGERY | Facility: CLINIC | Age: 65
End: 2025-02-03
Payer: COMMERCIAL

## 2025-02-03 ENCOUNTER — HOSPITAL ENCOUNTER (OUTPATIENT)
Dept: RADIOLOGY | Facility: CLINIC | Age: 65
Discharge: HOME | End: 2025-02-03
Payer: MEDICARE

## 2025-02-03 DIAGNOSIS — M25.551 RIGHT HIP PAIN: ICD-10-CM

## 2025-02-03 PROCEDURE — 72170 X-RAY EXAM OF PELVIS: CPT

## 2025-02-05 ENCOUNTER — HOME HEALTH ADMISSION (OUTPATIENT)
Dept: HOME HEALTH SERVICES | Facility: HOME HEALTH | Age: 65
End: 2025-02-05
Payer: MEDICARE

## 2025-02-05 ENCOUNTER — ANESTHESIA EVENT (OUTPATIENT)
Dept: OPERATING ROOM | Facility: HOSPITAL | Age: 65
End: 2025-02-05
Payer: MEDICARE

## 2025-02-06 ENCOUNTER — HOSPITAL ENCOUNTER (OUTPATIENT)
Facility: HOSPITAL | Age: 65
Discharge: HOME | End: 2025-02-07
Attending: STUDENT IN AN ORGANIZED HEALTH CARE EDUCATION/TRAINING PROGRAM | Admitting: STUDENT IN AN ORGANIZED HEALTH CARE EDUCATION/TRAINING PROGRAM
Payer: MEDICARE

## 2025-02-06 ENCOUNTER — APPOINTMENT (OUTPATIENT)
Dept: RADIOLOGY | Facility: HOSPITAL | Age: 65
End: 2025-02-06
Payer: MEDICARE

## 2025-02-06 ENCOUNTER — ANESTHESIA (OUTPATIENT)
Dept: OPERATING ROOM | Facility: HOSPITAL | Age: 65
End: 2025-02-06
Payer: MEDICARE

## 2025-02-06 DIAGNOSIS — M16.11 PRIMARY OSTEOARTHRITIS OF RIGHT HIP: ICD-10-CM

## 2025-02-06 DIAGNOSIS — Z96.641 HISTORY OF TOTAL HIP ARTHROPLASTY, RIGHT: Primary | ICD-10-CM

## 2025-02-06 PROCEDURE — 2500000001 HC RX 250 WO HCPCS SELF ADMINISTERED DRUGS (ALT 637 FOR MEDICARE OP): Performed by: STUDENT IN AN ORGANIZED HEALTH CARE EDUCATION/TRAINING PROGRAM

## 2025-02-06 PROCEDURE — 64447 NJX AA&/STRD FEMORAL NRV IMG: CPT | Performed by: ANESTHESIOLOGY

## 2025-02-06 PROCEDURE — 7100000011 HC EXTENDED STAY RECOVERY HOURLY - NURSING UNIT

## 2025-02-06 PROCEDURE — 3700000001 HC GENERAL ANESTHESIA TIME - INITIAL BASE CHARGE: Performed by: STUDENT IN AN ORGANIZED HEALTH CARE EDUCATION/TRAINING PROGRAM

## 2025-02-06 PROCEDURE — 76000 FLUOROSCOPY <1 HR PHYS/QHP: CPT | Mod: RT

## 2025-02-06 PROCEDURE — 2500000004 HC RX 250 GENERAL PHARMACY W/ HCPCS (ALT 636 FOR OP/ED): Performed by: NURSE ANESTHETIST, CERTIFIED REGISTERED

## 2025-02-06 PROCEDURE — 27130 TOTAL HIP ARTHROPLASTY: CPT | Performed by: STUDENT IN AN ORGANIZED HEALTH CARE EDUCATION/TRAINING PROGRAM

## 2025-02-06 PROCEDURE — 9420000001 HC RT PATIENT EDUCATION 5 MIN

## 2025-02-06 PROCEDURE — 3700000002 HC GENERAL ANESTHESIA TIME - EACH INCREMENTAL 1 MINUTE: Performed by: STUDENT IN AN ORGANIZED HEALTH CARE EDUCATION/TRAINING PROGRAM

## 2025-02-06 PROCEDURE — 2720000007 HC OR 272 NO HCPCS: Performed by: STUDENT IN AN ORGANIZED HEALTH CARE EDUCATION/TRAINING PROGRAM

## 2025-02-06 PROCEDURE — 97161 PT EVAL LOW COMPLEX 20 MIN: CPT | Mod: GP

## 2025-02-06 PROCEDURE — RXMED WILLOW AMBULATORY MEDICATION CHARGE

## 2025-02-06 PROCEDURE — 2500000004 HC RX 250 GENERAL PHARMACY W/ HCPCS (ALT 636 FOR OP/ED): Performed by: ANESTHESIOLOGY

## 2025-02-06 PROCEDURE — C1776 JOINT DEVICE (IMPLANTABLE): HCPCS | Performed by: STUDENT IN AN ORGANIZED HEALTH CARE EDUCATION/TRAINING PROGRAM

## 2025-02-06 PROCEDURE — 2700000047 HC OR 270 NO HCPCS: Performed by: STUDENT IN AN ORGANIZED HEALTH CARE EDUCATION/TRAINING PROGRAM

## 2025-02-06 PROCEDURE — 73502 X-RAY EXAM HIP UNI 2-3 VIEWS: CPT | Mod: RT

## 2025-02-06 PROCEDURE — A27130 PR TOTAL HIP ARTHROPLASTY: Performed by: ANESTHESIOLOGY

## 2025-02-06 PROCEDURE — 2500000004 HC RX 250 GENERAL PHARMACY W/ HCPCS (ALT 636 FOR OP/ED): Performed by: STUDENT IN AN ORGANIZED HEALTH CARE EDUCATION/TRAINING PROGRAM

## 2025-02-06 PROCEDURE — 3600000017 HC OR TIME - EACH INCREMENTAL 1 MINUTE - PROCEDURE LEVEL SIX: Performed by: STUDENT IN AN ORGANIZED HEALTH CARE EDUCATION/TRAINING PROGRAM

## 2025-02-06 PROCEDURE — 73502 X-RAY EXAM HIP UNI 2-3 VIEWS: CPT | Mod: RIGHT SIDE | Performed by: RADIOLOGY

## 2025-02-06 PROCEDURE — 7100000002 HC RECOVERY ROOM TIME - EACH INCREMENTAL 1 MINUTE: Performed by: STUDENT IN AN ORGANIZED HEALTH CARE EDUCATION/TRAINING PROGRAM

## 2025-02-06 PROCEDURE — 2500000005 HC RX 250 GENERAL PHARMACY W/O HCPCS: Performed by: STUDENT IN AN ORGANIZED HEALTH CARE EDUCATION/TRAINING PROGRAM

## 2025-02-06 PROCEDURE — 36415 COLL VENOUS BLD VENIPUNCTURE: CPT | Performed by: ANESTHESIOLOGY

## 2025-02-06 PROCEDURE — 97110 THERAPEUTIC EXERCISES: CPT | Mod: GP

## 2025-02-06 PROCEDURE — 2780000003 HC OR 278 NO HCPCS: Performed by: STUDENT IN AN ORGANIZED HEALTH CARE EDUCATION/TRAINING PROGRAM

## 2025-02-06 PROCEDURE — A27130 PR TOTAL HIP ARTHROPLASTY: Performed by: NURSE ANESTHETIST, CERTIFIED REGISTERED

## 2025-02-06 PROCEDURE — 2500000005 HC RX 250 GENERAL PHARMACY W/O HCPCS: Performed by: NURSE ANESTHETIST, CERTIFIED REGISTERED

## 2025-02-06 PROCEDURE — 97116 GAIT TRAINING THERAPY: CPT | Mod: GP

## 2025-02-06 PROCEDURE — C1713 ANCHOR/SCREW BN/BN,TIS/BN: HCPCS | Performed by: STUDENT IN AN ORGANIZED HEALTH CARE EDUCATION/TRAINING PROGRAM

## 2025-02-06 PROCEDURE — 7100000001 HC RECOVERY ROOM TIME - INITIAL BASE CHARGE: Performed by: STUDENT IN AN ORGANIZED HEALTH CARE EDUCATION/TRAINING PROGRAM

## 2025-02-06 PROCEDURE — 3600000018 HC OR TIME - INITIAL BASE CHARGE - PROCEDURE LEVEL SIX: Performed by: STUDENT IN AN ORGANIZED HEALTH CARE EDUCATION/TRAINING PROGRAM

## 2025-02-06 DEVICE — PINNACLE CANCELLOUS BONE SCREW 6.5MM X 15MM
Type: IMPLANTABLE DEVICE | Site: HIP | Status: FUNCTIONAL
Brand: PINNACLE

## 2025-02-06 DEVICE — BONE CHIPS,  CANCELL 30CC 1.7-10MM: Type: IMPLANTABLE DEVICE | Site: HIP | Status: FUNCTIONAL

## 2025-02-06 DEVICE — ACTIS DUOFIX HIP PROSTHESIS (FEMORAL STEM 12/14 TAPER CEMENTLESS SIZE 5 HIGH COLLAR)  CE
Type: IMPLANTABLE DEVICE | Site: HIP | Status: FUNCTIONAL
Brand: ACTIS

## 2025-02-06 DEVICE — IMPLANTABLE DEVICE: Type: IMPLANTABLE DEVICE | Site: HIP | Status: FUNCTIONAL

## 2025-02-06 DEVICE — PINNACLE CANCELLOUS BONE SCREW 6.5MM X 20MM
Type: IMPLANTABLE DEVICE | Site: HIP | Status: FUNCTIONAL
Brand: PINNACLE

## 2025-02-06 DEVICE — EMPHASYS ACETABULAR SHELL MULTI-HOLE 52MM CEMENTLESS
Type: IMPLANTABLE DEVICE | Site: HIP | Status: FUNCTIONAL
Brand: EMPHASYS

## 2025-02-06 DEVICE — PINNACLE CANCELLOUS BONE SCREW 6.5MM X 45MM
Type: IMPLANTABLE DEVICE | Site: HIP | Status: FUNCTIONAL
Brand: PINNACLE

## 2025-02-06 RX ORDER — TRANEXAMIC ACID 650 MG/1
1950 TABLET ORAL ONCE
Status: COMPLETED | OUTPATIENT
Start: 2025-02-07 | End: 2025-02-07

## 2025-02-06 RX ORDER — BUPIVACAINE HCL/EPINEPHRINE 0.5-1:200K
VIAL (ML) INJECTION
Status: DISPENSED
Start: 2025-02-06 | End: 2025-02-06

## 2025-02-06 RX ORDER — ACETAMINOPHEN 325 MG/1
975 TABLET ORAL ONCE
Status: COMPLETED | OUTPATIENT
Start: 2025-02-06 | End: 2025-02-06

## 2025-02-06 RX ORDER — SODIUM CHLORIDE, SODIUM LACTATE, POTASSIUM CHLORIDE, CALCIUM CHLORIDE 600; 310; 30; 20 MG/100ML; MG/100ML; MG/100ML; MG/100ML
50 INJECTION, SOLUTION INTRAVENOUS CONTINUOUS
Status: ACTIVE | OUTPATIENT
Start: 2025-02-06 | End: 2025-02-07

## 2025-02-06 RX ORDER — ALBUTEROL SULFATE 0.83 MG/ML
3 SOLUTION RESPIRATORY (INHALATION) EVERY 2 HOUR PRN
Status: DISCONTINUED | OUTPATIENT
Start: 2025-02-06 | End: 2025-02-07 | Stop reason: HOSPADM

## 2025-02-06 RX ORDER — FENTANYL CITRATE 50 UG/ML
INJECTION, SOLUTION INTRAMUSCULAR; INTRAVENOUS AS NEEDED
Status: DISCONTINUED | OUTPATIENT
Start: 2025-02-06 | End: 2025-02-06

## 2025-02-06 RX ORDER — ROCURONIUM BROMIDE 10 MG/ML
INJECTION, SOLUTION INTRAVENOUS AS NEEDED
Status: DISCONTINUED | OUTPATIENT
Start: 2025-02-06 | End: 2025-02-06

## 2025-02-06 RX ORDER — OXYCODONE HYDROCHLORIDE 5 MG/1
5 TABLET ORAL EVERY 6 HOURS PRN
Status: DISCONTINUED | OUTPATIENT
Start: 2025-02-06 | End: 2025-02-07 | Stop reason: HOSPADM

## 2025-02-06 RX ORDER — DOCUSATE SODIUM 100 MG/1
100 CAPSULE, LIQUID FILLED ORAL 2 TIMES DAILY
Status: DISCONTINUED | OUTPATIENT
Start: 2025-02-06 | End: 2025-02-07 | Stop reason: HOSPADM

## 2025-02-06 RX ORDER — HYDROMORPHONE HYDROCHLORIDE 1 MG/ML
INJECTION, SOLUTION INTRAMUSCULAR; INTRAVENOUS; SUBCUTANEOUS AS NEEDED
Status: DISCONTINUED | OUTPATIENT
Start: 2025-02-06 | End: 2025-02-06

## 2025-02-06 RX ORDER — WATER 1 ML/ML
IRRIGANT IRRIGATION AS NEEDED
Status: DISCONTINUED | OUTPATIENT
Start: 2025-02-06 | End: 2025-02-06 | Stop reason: HOSPADM

## 2025-02-06 RX ORDER — ONDANSETRON HYDROCHLORIDE 2 MG/ML
4 INJECTION, SOLUTION INTRAVENOUS EVERY 8 HOURS PRN
Status: DISCONTINUED | OUTPATIENT
Start: 2025-02-06 | End: 2025-02-07 | Stop reason: HOSPADM

## 2025-02-06 RX ORDER — ROSUVASTATIN CALCIUM 20 MG/1
20 TABLET, COATED ORAL DAILY
Status: DISCONTINUED | OUTPATIENT
Start: 2025-02-06 | End: 2025-02-07 | Stop reason: HOSPADM

## 2025-02-06 RX ORDER — ASPIRIN 81 MG/1
81 TABLET ORAL 2 TIMES DAILY
Status: DISCONTINUED | OUTPATIENT
Start: 2025-02-06 | End: 2025-02-07 | Stop reason: HOSPADM

## 2025-02-06 RX ORDER — CEFAZOLIN 1 G/1
INJECTION, POWDER, FOR SOLUTION INTRAVENOUS AS NEEDED
Status: DISCONTINUED | OUTPATIENT
Start: 2025-02-06 | End: 2025-02-06

## 2025-02-06 RX ORDER — PROPOFOL 10 MG/ML
INJECTION, EMULSION INTRAVENOUS AS NEEDED
Status: DISCONTINUED | OUTPATIENT
Start: 2025-02-06 | End: 2025-02-06

## 2025-02-06 RX ORDER — OXYCODONE HYDROCHLORIDE 5 MG/1
10 TABLET ORAL EVERY 4 HOURS PRN
Status: DISCONTINUED | OUTPATIENT
Start: 2025-02-06 | End: 2025-02-07 | Stop reason: HOSPADM

## 2025-02-06 RX ORDER — PANTOPRAZOLE SODIUM 40 MG/1
40 TABLET, DELAYED RELEASE ORAL
Status: DISCONTINUED | OUTPATIENT
Start: 2025-02-07 | End: 2025-02-07 | Stop reason: HOSPADM

## 2025-02-06 RX ORDER — PHENYLEPHRINE HCL IN 0.9% NACL 1 MG/10 ML
SYRINGE (ML) INTRAVENOUS AS NEEDED
Status: DISCONTINUED | OUTPATIENT
Start: 2025-02-06 | End: 2025-02-06

## 2025-02-06 RX ORDER — METHOCARBAMOL 100 MG/ML
INJECTION, SOLUTION INTRAMUSCULAR; INTRAVENOUS AS NEEDED
Status: DISCONTINUED | OUTPATIENT
Start: 2025-02-06 | End: 2025-02-06

## 2025-02-06 RX ORDER — ONDANSETRON HYDROCHLORIDE 2 MG/ML
INJECTION, SOLUTION INTRAVENOUS AS NEEDED
Status: DISCONTINUED | OUTPATIENT
Start: 2025-02-06 | End: 2025-02-06

## 2025-02-06 RX ORDER — SCOPOLAMINE 1 MG/3D
1 PATCH, EXTENDED RELEASE TRANSDERMAL
Status: DISCONTINUED | OUTPATIENT
Start: 2025-02-06 | End: 2025-02-07 | Stop reason: HOSPADM

## 2025-02-06 RX ORDER — OXYCODONE HYDROCHLORIDE 5 MG/1
5 TABLET ORAL
Status: DISCONTINUED | OUTPATIENT
Start: 2025-02-06 | End: 2025-02-06 | Stop reason: HOSPADM

## 2025-02-06 RX ORDER — FLUTICASONE PROPIONATE 50 MCG
1 SPRAY, SUSPENSION (ML) NASAL AS NEEDED
Status: DISCONTINUED | OUTPATIENT
Start: 2025-02-06 | End: 2025-02-07 | Stop reason: HOSPADM

## 2025-02-06 RX ORDER — METFORMIN HYDROCHLORIDE 500 MG/1
500 TABLET ORAL
Status: DISCONTINUED | OUTPATIENT
Start: 2025-02-06 | End: 2025-02-07 | Stop reason: HOSPADM

## 2025-02-06 RX ORDER — KETOROLAC TROMETHAMINE 30 MG/ML
INJECTION, SOLUTION INTRAMUSCULAR; INTRAVENOUS AS NEEDED
Status: DISCONTINUED | OUTPATIENT
Start: 2025-02-06 | End: 2025-02-06

## 2025-02-06 RX ORDER — FENTANYL CITRATE 50 UG/ML
INJECTION, SOLUTION INTRAMUSCULAR; INTRAVENOUS
Status: DISPENSED
Start: 2025-02-06 | End: 2025-02-06

## 2025-02-06 RX ORDER — TRANEXAMIC ACID 100 MG/ML
INJECTION, SOLUTION INTRAVENOUS AS NEEDED
Status: DISCONTINUED | OUTPATIENT
Start: 2025-02-06 | End: 2025-02-06

## 2025-02-06 RX ORDER — SODIUM CHLORIDE 0.9 G/100ML
INJECTION, SOLUTION IRRIGATION AS NEEDED
Status: DISCONTINUED | OUTPATIENT
Start: 2025-02-06 | End: 2025-02-06 | Stop reason: HOSPADM

## 2025-02-06 RX ORDER — LIDOCAINE 560 MG/1
1 PATCH PERCUTANEOUS; TOPICAL; TRANSDERMAL DAILY
Status: DISCONTINUED | OUTPATIENT
Start: 2025-02-06 | End: 2025-02-07 | Stop reason: HOSPADM

## 2025-02-06 RX ORDER — SODIUM CHLORIDE, SODIUM LACTATE, POTASSIUM CHLORIDE, CALCIUM CHLORIDE 600; 310; 30; 20 MG/100ML; MG/100ML; MG/100ML; MG/100ML
100 INJECTION, SOLUTION INTRAVENOUS CONTINUOUS
Status: DISCONTINUED | OUTPATIENT
Start: 2025-02-06 | End: 2025-02-06 | Stop reason: HOSPADM

## 2025-02-06 RX ORDER — PROCHLORPERAZINE EDISYLATE 5 MG/ML
5 INJECTION INTRAMUSCULAR; INTRAVENOUS ONCE AS NEEDED
Status: DISCONTINUED | OUTPATIENT
Start: 2025-02-06 | End: 2025-02-06 | Stop reason: HOSPADM

## 2025-02-06 RX ORDER — ALBUTEROL SULFATE 0.83 MG/ML
3 SOLUTION RESPIRATORY (INHALATION) EVERY 4 HOURS PRN
Status: DISCONTINUED | OUTPATIENT
Start: 2025-02-06 | End: 2025-02-06

## 2025-02-06 RX ORDER — HYDRALAZINE HYDROCHLORIDE 20 MG/ML
5 INJECTION INTRAMUSCULAR; INTRAVENOUS EVERY 30 MIN PRN
Status: DISCONTINUED | OUTPATIENT
Start: 2025-02-06 | End: 2025-02-06 | Stop reason: HOSPADM

## 2025-02-06 RX ORDER — BISACODYL 10 MG/1
10 SUPPOSITORY RECTAL DAILY PRN
Status: DISCONTINUED | OUTPATIENT
Start: 2025-02-06 | End: 2025-02-07 | Stop reason: HOSPADM

## 2025-02-06 RX ORDER — DROPERIDOL 2.5 MG/ML
0.62 INJECTION, SOLUTION INTRAMUSCULAR; INTRAVENOUS ONCE AS NEEDED
Status: DISCONTINUED | OUTPATIENT
Start: 2025-02-06 | End: 2025-02-06 | Stop reason: HOSPADM

## 2025-02-06 RX ORDER — METOCLOPRAMIDE 10 MG/1
10 TABLET ORAL EVERY 6 HOURS PRN
Status: DISCONTINUED | OUTPATIENT
Start: 2025-02-06 | End: 2025-02-07 | Stop reason: HOSPADM

## 2025-02-06 RX ORDER — MIDAZOLAM HYDROCHLORIDE 1 MG/ML
INJECTION, SOLUTION INTRAMUSCULAR; INTRAVENOUS AS NEEDED
Status: DISCONTINUED | OUTPATIENT
Start: 2025-02-06 | End: 2025-02-06

## 2025-02-06 RX ORDER — POLYETHYLENE GLYCOL 3350 17 G/17G
17 POWDER, FOR SOLUTION ORAL DAILY
Status: DISCONTINUED | OUTPATIENT
Start: 2025-02-06 | End: 2025-02-07 | Stop reason: HOSPADM

## 2025-02-06 RX ORDER — CYCLOBENZAPRINE HCL 5 MG
5 TABLET ORAL 3 TIMES DAILY PRN
Status: DISCONTINUED | OUTPATIENT
Start: 2025-02-06 | End: 2025-02-07 | Stop reason: HOSPADM

## 2025-02-06 RX ORDER — CEFADROXIL 500 MG/1
500 CAPSULE ORAL 2 TIMES DAILY
Status: DISCONTINUED | OUTPATIENT
Start: 2025-02-07 | End: 2025-02-07 | Stop reason: HOSPADM

## 2025-02-06 RX ORDER — ONDANSETRON 4 MG/1
4 TABLET, FILM COATED ORAL EVERY 8 HOURS PRN
Status: DISCONTINUED | OUTPATIENT
Start: 2025-02-06 | End: 2025-02-07 | Stop reason: HOSPADM

## 2025-02-06 RX ORDER — CEFAZOLIN SODIUM 2 G/100ML
2 INJECTION, SOLUTION INTRAVENOUS EVERY 8 HOURS
Status: COMPLETED | OUTPATIENT
Start: 2025-02-06 | End: 2025-02-07

## 2025-02-06 RX ORDER — KETOROLAC TROMETHAMINE 30 MG/ML
15 INJECTION, SOLUTION INTRAMUSCULAR; INTRAVENOUS EVERY 6 HOURS
Status: DISCONTINUED | OUTPATIENT
Start: 2025-02-06 | End: 2025-02-07 | Stop reason: HOSPADM

## 2025-02-06 RX ORDER — SODIUM CHLORIDE, SODIUM LACTATE, POTASSIUM CHLORIDE, CALCIUM CHLORIDE 600; 310; 30; 20 MG/100ML; MG/100ML; MG/100ML; MG/100ML
INJECTION, SOLUTION INTRAVENOUS CONTINUOUS PRN
Status: DISCONTINUED | OUTPATIENT
Start: 2025-02-06 | End: 2025-02-06

## 2025-02-06 RX ORDER — ONDANSETRON HYDROCHLORIDE 2 MG/ML
4 INJECTION, SOLUTION INTRAVENOUS ONCE AS NEEDED
Status: DISCONTINUED | OUTPATIENT
Start: 2025-02-06 | End: 2025-02-06 | Stop reason: HOSPADM

## 2025-02-06 RX ORDER — METOCLOPRAMIDE HYDROCHLORIDE 5 MG/ML
10 INJECTION INTRAMUSCULAR; INTRAVENOUS EVERY 6 HOURS PRN
Status: DISCONTINUED | OUTPATIENT
Start: 2025-02-06 | End: 2025-02-07 | Stop reason: HOSPADM

## 2025-02-06 RX ORDER — LIDOCAINE HYDROCHLORIDE 20 MG/ML
INJECTION, SOLUTION INFILTRATION; PERINEURAL AS NEEDED
Status: DISCONTINUED | OUTPATIENT
Start: 2025-02-06 | End: 2025-02-06

## 2025-02-06 RX ORDER — DEXTROMETHORPHAN HYDROBROMIDE, GUAIFENESIN 5; 100 MG/5ML; MG/5ML
650 LIQUID ORAL EVERY 8 HOURS PRN
Status: ON HOLD | COMMUNITY
End: 2025-02-07 | Stop reason: ALTCHOICE

## 2025-02-06 RX ORDER — NALOXONE HYDROCHLORIDE 0.4 MG/ML
0.2 INJECTION, SOLUTION INTRAMUSCULAR; INTRAVENOUS; SUBCUTANEOUS EVERY 5 MIN PRN
Status: DISCONTINUED | OUTPATIENT
Start: 2025-02-06 | End: 2025-02-07 | Stop reason: HOSPADM

## 2025-02-06 RX ORDER — DEXAMETHASONE SODIUM PHOSPHATE 10 MG/ML
5 INJECTION INTRAMUSCULAR; INTRAVENOUS ONCE
Status: COMPLETED | OUTPATIENT
Start: 2025-02-06 | End: 2025-02-06

## 2025-02-06 RX ORDER — CELECOXIB 200 MG/1
200 CAPSULE ORAL ONCE
Status: COMPLETED | OUTPATIENT
Start: 2025-02-06 | End: 2025-02-06

## 2025-02-06 RX ORDER — ROPIVACAINE/EPI/CLONIDINE/KET 2.46-0.005
SYRINGE (ML) INJECTION AS NEEDED
Status: DISCONTINUED | OUTPATIENT
Start: 2025-02-06 | End: 2025-02-06 | Stop reason: HOSPADM

## 2025-02-06 RX ORDER — BISACODYL 5 MG
10 TABLET, DELAYED RELEASE (ENTERIC COATED) ORAL DAILY PRN
Status: DISCONTINUED | OUTPATIENT
Start: 2025-02-06 | End: 2025-02-07 | Stop reason: HOSPADM

## 2025-02-06 RX ORDER — ACETAMINOPHEN 325 MG/1
650 TABLET ORAL EVERY 4 HOURS PRN
Status: DISCONTINUED | OUTPATIENT
Start: 2025-02-06 | End: 2025-02-07 | Stop reason: HOSPADM

## 2025-02-06 RX ORDER — MIDAZOLAM HYDROCHLORIDE 1 MG/ML
INJECTION INTRAMUSCULAR; INTRAVENOUS AS NEEDED
Status: DISCONTINUED | OUTPATIENT
Start: 2025-02-06 | End: 2025-02-06

## 2025-02-06 RX ORDER — MIDAZOLAM HYDROCHLORIDE 1 MG/ML
INJECTION INTRAMUSCULAR; INTRAVENOUS
Status: DISPENSED
Start: 2025-02-06 | End: 2025-02-06

## 2025-02-06 RX ADMIN — OXYCODONE HYDROCHLORIDE 10 MG: 5 TABLET ORAL at 16:22

## 2025-02-06 RX ADMIN — ROCURONIUM BROMIDE 50 MG: 10 INJECTION, SOLUTION INTRAVENOUS at 07:58

## 2025-02-06 RX ADMIN — TRANEXAMIC ACID 1000 MG: 1 INJECTION, SOLUTION INTRAVENOUS at 08:00

## 2025-02-06 RX ADMIN — ACETAMINOPHEN 975 MG: 325 TABLET, FILM COATED ORAL at 06:29

## 2025-02-06 RX ADMIN — ASPIRIN 81 MG: 81 TABLET, COATED ORAL at 16:22

## 2025-02-06 RX ADMIN — ROCURONIUM BROMIDE 10 MG: 10 INJECTION, SOLUTION INTRAVENOUS at 08:45

## 2025-02-06 RX ADMIN — Medication 10 MG: at 09:49

## 2025-02-06 RX ADMIN — MIDAZOLAM HYDROCHLORIDE 2 MG: 1 INJECTION, SOLUTION INTRAMUSCULAR; INTRAVENOUS at 07:44

## 2025-02-06 RX ADMIN — ONDANSETRON 4 MG: 2 INJECTION, SOLUTION INTRAMUSCULAR; INTRAVENOUS at 11:50

## 2025-02-06 RX ADMIN — Medication 20 MG: at 10:26

## 2025-02-06 RX ADMIN — METHOCARBAMOL 500 MG: 100 INJECTION INTRAMUSCULAR; INTRAVENOUS at 11:28

## 2025-02-06 RX ADMIN — LIDOCAINE 4% 1 PATCH: 40 PATCH TOPICAL at 16:21

## 2025-02-06 RX ADMIN — LIDOCAINE HYDROCHLORIDE 80 MG: 20 INJECTION, SOLUTION INFILTRATION; PERINEURAL at 07:58

## 2025-02-06 RX ADMIN — CELECOXIB 200 MG: 200 CAPSULE ORAL at 06:29

## 2025-02-06 RX ADMIN — Medication 20 MG: at 11:09

## 2025-02-06 RX ADMIN — SCOPOLAMINE 1 PATCH: 1.5 PATCH, EXTENDED RELEASE TRANSDERMAL at 06:29

## 2025-02-06 RX ADMIN — ROCURONIUM BROMIDE 10 MG: 10 INJECTION, SOLUTION INTRAVENOUS at 11:11

## 2025-02-06 RX ADMIN — SUGAMMADEX 200 MG: 100 INJECTION, SOLUTION INTRAVENOUS at 12:05

## 2025-02-06 RX ADMIN — ROCURONIUM BROMIDE 10 MG: 10 INJECTION, SOLUTION INTRAVENOUS at 09:47

## 2025-02-06 RX ADMIN — KETOROLAC TROMETHAMINE 15 MG: 30 INJECTION, SOLUTION INTRAMUSCULAR at 19:06

## 2025-02-06 RX ADMIN — SODIUM CHLORIDE, SODIUM LACTATE, POTASSIUM CHLORIDE, AND CALCIUM CHLORIDE: .6; .31; .03; .02 INJECTION, SOLUTION INTRAVENOUS at 07:43

## 2025-02-06 RX ADMIN — Medication 150 MCG: at 11:19

## 2025-02-06 RX ADMIN — METHOCARBAMOL 500 MG: 100 INJECTION INTRAMUSCULAR; INTRAVENOUS at 10:19

## 2025-02-06 RX ADMIN — CEFAZOLIN SODIUM 2 G: 2 INJECTION, SOLUTION INTRAVENOUS at 20:11

## 2025-02-06 RX ADMIN — POVIDONE-IODINE 1 APPLICATION: 5 SOLUTION TOPICAL at 06:30

## 2025-02-06 RX ADMIN — DEXAMETHASONE SODIUM PHOSPHATE 5 MG: 10 INJECTION INTRAMUSCULAR; INTRAVENOUS at 19:05

## 2025-02-06 RX ADMIN — DOCUSATE SODIUM 100 MG: 100 CAPSULE, LIQUID FILLED ORAL at 16:22

## 2025-02-06 RX ADMIN — KETOROLAC TROMETHAMINE 30 MG: 30 INJECTION, SOLUTION INTRAMUSCULAR at 11:57

## 2025-02-06 RX ADMIN — SODIUM CHLORIDE, POTASSIUM CHLORIDE, SODIUM LACTATE AND CALCIUM CHLORIDE 50 ML/HR: 600; 310; 30; 20 INJECTION, SOLUTION INTRAVENOUS at 16:21

## 2025-02-06 RX ADMIN — FENTANYL CITRATE 100 MCG: 50 INJECTION, SOLUTION INTRAMUSCULAR; INTRAVENOUS at 07:15

## 2025-02-06 RX ADMIN — CEFAZOLIN 2 G: 330 INJECTION, POWDER, FOR SOLUTION INTRAMUSCULAR; INTRAVENOUS at 07:59

## 2025-02-06 RX ADMIN — HYDROMORPHONE HYDROCHLORIDE 0.5 MG: 1 INJECTION, SOLUTION INTRAMUSCULAR; INTRAVENOUS; SUBCUTANEOUS at 09:47

## 2025-02-06 RX ADMIN — DEXAMETHASONE SODIUM PHOSPHATE 4 MG: 4 INJECTION, SOLUTION INTRAMUSCULAR; INTRAVENOUS at 11:27

## 2025-02-06 RX ADMIN — FENTANYL CITRATE 50 MCG: 50 INJECTION, SOLUTION INTRAMUSCULAR; INTRAVENOUS at 07:44

## 2025-02-06 RX ADMIN — ROCURONIUM BROMIDE 10 MG: 10 INJECTION, SOLUTION INTRAVENOUS at 09:25

## 2025-02-06 RX ADMIN — ROCURONIUM BROMIDE 10 MG: 10 INJECTION, SOLUTION INTRAVENOUS at 09:16

## 2025-02-06 RX ADMIN — FENTANYL CITRATE 50 MCG: 50 INJECTION, SOLUTION INTRAMUSCULAR; INTRAVENOUS at 08:40

## 2025-02-06 RX ADMIN — SODIUM CHLORIDE, SODIUM LACTATE, POTASSIUM CHLORIDE, AND CALCIUM CHLORIDE: .6; .31; .03; .02 INJECTION, SOLUTION INTRAVENOUS at 11:11

## 2025-02-06 RX ADMIN — MIDAZOLAM HYDROCHLORIDE 2 MG: 1 INJECTION, SOLUTION INTRAMUSCULAR; INTRAVENOUS at 07:15

## 2025-02-06 RX ADMIN — PROPOFOL 150 MG: 10 INJECTION, EMULSION INTRAVENOUS at 07:58

## 2025-02-06 RX ADMIN — HYDROMORPHONE HYDROCHLORIDE 0.5 MG: 1 INJECTION, SOLUTION INTRAMUSCULAR; INTRAVENOUS; SUBCUTANEOUS at 08:45

## 2025-02-06 RX ADMIN — CEFAZOLIN 2 G: 330 INJECTION, POWDER, FOR SOLUTION INTRAMUSCULAR; INTRAVENOUS at 11:59

## 2025-02-06 SDOH — SOCIAL STABILITY: SOCIAL INSECURITY
WITHIN THE LAST YEAR, HAVE YOU BEEN KICKED, HIT, SLAPPED, OR OTHERWISE PHYSICALLY HURT BY YOUR PARTNER OR EX-PARTNER?: NO

## 2025-02-06 SDOH — SOCIAL STABILITY: SOCIAL INSECURITY: HAVE YOU HAD ANY THOUGHTS OF HARMING ANYONE ELSE?: NO

## 2025-02-06 SDOH — SOCIAL STABILITY: SOCIAL INSECURITY
WITHIN THE LAST YEAR, HAVE YOU BEEN RAPED OR FORCED TO HAVE ANY KIND OF SEXUAL ACTIVITY BY YOUR PARTNER OR EX-PARTNER?: NO

## 2025-02-06 SDOH — ECONOMIC STABILITY: INCOME INSECURITY: IN THE PAST 12 MONTHS HAS THE ELECTRIC, GAS, OIL, OR WATER COMPANY THREATENED TO SHUT OFF SERVICES IN YOUR HOME?: NO

## 2025-02-06 SDOH — SOCIAL STABILITY: SOCIAL INSECURITY: WITHIN THE LAST YEAR, HAVE YOU BEEN HUMILIATED OR EMOTIONALLY ABUSED IN OTHER WAYS BY YOUR PARTNER OR EX-PARTNER?: NO

## 2025-02-06 SDOH — SOCIAL STABILITY: SOCIAL INSECURITY: DO YOU FEEL UNSAFE GOING BACK TO THE PLACE WHERE YOU ARE LIVING?: NO

## 2025-02-06 SDOH — ECONOMIC STABILITY: FOOD INSECURITY: WITHIN THE PAST 12 MONTHS, YOU WORRIED THAT YOUR FOOD WOULD RUN OUT BEFORE YOU GOT THE MONEY TO BUY MORE.: NEVER TRUE

## 2025-02-06 SDOH — HEALTH STABILITY: MENTAL HEALTH: CURRENT SMOKER: 0

## 2025-02-06 SDOH — ECONOMIC STABILITY: HOUSING INSECURITY: AT ANY TIME IN THE PAST 12 MONTHS, WERE YOU HOMELESS OR LIVING IN A SHELTER (INCLUDING NOW)?: NO

## 2025-02-06 SDOH — SOCIAL STABILITY: SOCIAL INSECURITY: HAS ANYONE EVER THREATENED TO HURT YOUR FAMILY OR YOUR PETS?: NO

## 2025-02-06 SDOH — ECONOMIC STABILITY: HOUSING INSECURITY: IN THE PAST 12 MONTHS, HOW MANY TIMES HAVE YOU MOVED WHERE YOU WERE LIVING?: 1

## 2025-02-06 SDOH — SOCIAL STABILITY: SOCIAL INSECURITY: ABUSE: ADULT

## 2025-02-06 SDOH — ECONOMIC STABILITY: TRANSPORTATION INSECURITY: IN THE PAST 12 MONTHS, HAS LACK OF TRANSPORTATION KEPT YOU FROM MEDICAL APPOINTMENTS OR FROM GETTING MEDICATIONS?: NO

## 2025-02-06 SDOH — SOCIAL STABILITY: SOCIAL INSECURITY: ARE YOU OR HAVE YOU BEEN THREATENED OR ABUSED PHYSICALLY, EMOTIONALLY, OR SEXUALLY BY ANYONE?: NO

## 2025-02-06 SDOH — SOCIAL STABILITY: SOCIAL INSECURITY: WITHIN THE LAST YEAR, HAVE YOU BEEN AFRAID OF YOUR PARTNER OR EX-PARTNER?: NO

## 2025-02-06 SDOH — ECONOMIC STABILITY: HOUSING INSECURITY: IN THE LAST 12 MONTHS, WAS THERE A TIME WHEN YOU WERE NOT ABLE TO PAY THE MORTGAGE OR RENT ON TIME?: NO

## 2025-02-06 SDOH — ECONOMIC STABILITY: FOOD INSECURITY: WITHIN THE PAST 12 MONTHS, THE FOOD YOU BOUGHT JUST DIDN'T LAST AND YOU DIDN'T HAVE MONEY TO GET MORE.: NEVER TRUE

## 2025-02-06 SDOH — SOCIAL STABILITY: SOCIAL INSECURITY: ARE THERE ANY APPARENT SIGNS OF INJURIES/BEHAVIORS THAT COULD BE RELATED TO ABUSE/NEGLECT?: NO

## 2025-02-06 SDOH — ECONOMIC STABILITY: FOOD INSECURITY: HOW HARD IS IT FOR YOU TO PAY FOR THE VERY BASICS LIKE FOOD, HOUSING, MEDICAL CARE, AND HEATING?: NOT HARD AT ALL

## 2025-02-06 SDOH — SOCIAL STABILITY: SOCIAL INSECURITY: WERE YOU ABLE TO COMPLETE ALL THE BEHAVIORAL HEALTH SCREENINGS?: NO

## 2025-02-06 SDOH — SOCIAL STABILITY: SOCIAL INSECURITY: DOES ANYONE TRY TO KEEP YOU FROM HAVING/CONTACTING OTHER FRIENDS OR DOING THINGS OUTSIDE YOUR HOME?: NO

## 2025-02-06 SDOH — SOCIAL STABILITY: SOCIAL INSECURITY: HAVE YOU HAD THOUGHTS OF HARMING ANYONE ELSE?: NO

## 2025-02-06 SDOH — SOCIAL STABILITY: SOCIAL INSECURITY: DO YOU FEEL ANYONE HAS EXPLOITED OR TAKEN ADVANTAGE OF YOU FINANCIALLY OR OF YOUR PERSONAL PROPERTY?: NO

## 2025-02-06 ASSESSMENT — COGNITIVE AND FUNCTIONAL STATUS - GENERAL
TURNING FROM BACK TO SIDE WHILE IN FLAT BAD: A LITTLE
DRESSING REGULAR UPPER BODY CLOTHING: A LITTLE
EATING MEALS: A LITTLE
STANDING UP FROM CHAIR USING ARMS: A LITTLE
TOILETING: A LITTLE
PATIENT BASELINE BEDBOUND: NO
TOILETING: A LITTLE
HELP NEEDED FOR BATHING: A LITTLE
STANDING UP FROM CHAIR USING ARMS: A LITTLE
CLIMB 3 TO 5 STEPS WITH RAILING: A LITTLE
WALKING IN HOSPITAL ROOM: A LITTLE
WALKING IN HOSPITAL ROOM: A LITTLE
PERSONAL GROOMING: A LITTLE
MOVING FROM LYING ON BACK TO SITTING ON SIDE OF FLAT BED WITH BEDRAILS: A LITTLE
STANDING UP FROM CHAIR USING ARMS: A LITTLE
CLIMB 3 TO 5 STEPS WITH RAILING: A LITTLE
MOBILITY SCORE: 18
DRESSING REGULAR UPPER BODY CLOTHING: A LITTLE
EATING MEALS: A LITTLE
WALKING IN HOSPITAL ROOM: A LITTLE
HELP NEEDED FOR BATHING: A LITTLE
MOVING FROM LYING ON BACK TO SITTING ON SIDE OF FLAT BED WITH BEDRAILS: A LITTLE
TURNING FROM BACK TO SIDE WHILE IN FLAT BAD: A LITTLE
MOVING TO AND FROM BED TO CHAIR: A LITTLE
CLIMB 3 TO 5 STEPS WITH RAILING: TOTAL
HELP NEEDED FOR BATHING: A LITTLE
DRESSING REGULAR UPPER BODY CLOTHING: A LITTLE
MOVING TO AND FROM BED TO CHAIR: A LITTLE
DAILY ACTIVITIY SCORE: 18
EATING MEALS: A LITTLE
DRESSING REGULAR UPPER BODY CLOTHING: A LITTLE
MOBILITY SCORE: 18
DRESSING REGULAR LOWER BODY CLOTHING: A LITTLE
DRESSING REGULAR LOWER BODY CLOTHING: A LITTLE
MOVING FROM LYING ON BACK TO SITTING ON SIDE OF FLAT BED WITH BEDRAILS: A LITTLE
DAILY ACTIVITIY SCORE: 18
TOILETING: A LITTLE
WALKING IN HOSPITAL ROOM: A LITTLE
DRESSING REGULAR LOWER BODY CLOTHING: A LITTLE
DRESSING REGULAR LOWER BODY CLOTHING: A LITTLE
STANDING UP FROM CHAIR USING ARMS: A LITTLE
TURNING FROM BACK TO SIDE WHILE IN FLAT BAD: A LITTLE
MOVING TO AND FROM BED TO CHAIR: A LITTLE
WALKING IN HOSPITAL ROOM: A LITTLE
TURNING FROM BACK TO SIDE WHILE IN FLAT BAD: A LITTLE
CLIMB 3 TO 5 STEPS WITH RAILING: A LITTLE
PERSONAL GROOMING: A LITTLE
STANDING UP FROM CHAIR USING ARMS: A LITTLE
CLIMB 3 TO 5 STEPS WITH RAILING: A LITTLE
EATING MEALS: A LITTLE
MOVING FROM LYING ON BACK TO SITTING ON SIDE OF FLAT BED WITH BEDRAILS: A LITTLE
MOBILITY SCORE: 18
MOVING TO AND FROM BED TO CHAIR: A LITTLE
HELP NEEDED FOR BATHING: A LITTLE
MOVING TO AND FROM BED TO CHAIR: A LITTLE
TOILETING: A LITTLE

## 2025-02-06 ASSESSMENT — ACTIVITIES OF DAILY LIVING (ADL)
HEARING - LEFT EAR: FUNCTIONAL
FEEDING YOURSELF: INDEPENDENT
JUDGMENT_ADEQUATE_SAFELY_COMPLETE_DAILY_ACTIVITIES: YES
PATIENT'S MEMORY ADEQUATE TO SAFELY COMPLETE DAILY ACTIVITIES?: YES
WALKS IN HOME: INDEPENDENT
LACK_OF_TRANSPORTATION: NO
GROOMING: INDEPENDENT
ADL_ASSISTANCE: INDEPENDENT
TOILETING: INDEPENDENT
LACK_OF_TRANSPORTATION: NO
DRESSING YOURSELF: INDEPENDENT
HEARING - RIGHT EAR: FUNCTIONAL
ADEQUATE_TO_COMPLETE_ADL: YES
BATHING: INDEPENDENT

## 2025-02-06 ASSESSMENT — PAIN SCALES - GENERAL
PAINLEVEL_OUTOF10: 0 - NO PAIN
PAINLEVEL_OUTOF10: 0 - NO PAIN
PAINLEVEL_OUTOF10: 7
PAINLEVEL_OUTOF10: 4
PAINLEVEL_OUTOF10: 0 - NO PAIN
PAINLEVEL_OUTOF10: 7
PAINLEVEL_OUTOF10: 0 - NO PAIN

## 2025-02-06 ASSESSMENT — LIFESTYLE VARIABLES
AUDIT-C TOTAL SCORE: 0
HOW OFTEN DO YOU HAVE A DRINK CONTAINING ALCOHOL: NEVER
HOW OFTEN DO YOU HAVE 6 OR MORE DRINKS ON ONE OCCASION: NEVER
AUDIT-C TOTAL SCORE: 0
HOW MANY STANDARD DRINKS CONTAINING ALCOHOL DO YOU HAVE ON A TYPICAL DAY: PATIENT DOES NOT DRINK
SKIP TO QUESTIONS 9-10: 1

## 2025-02-06 ASSESSMENT — COLUMBIA-SUICIDE SEVERITY RATING SCALE - C-SSRS
6. HAVE YOU EVER DONE ANYTHING, STARTED TO DO ANYTHING, OR PREPARED TO DO ANYTHING TO END YOUR LIFE?: NO
2. HAVE YOU ACTUALLY HAD ANY THOUGHTS OF KILLING YOURSELF?: NO
1. IN THE PAST MONTH, HAVE YOU WISHED YOU WERE DEAD OR WISHED YOU COULD GO TO SLEEP AND NOT WAKE UP?: NO

## 2025-02-06 ASSESSMENT — PAIN - FUNCTIONAL ASSESSMENT
PAIN_FUNCTIONAL_ASSESSMENT: 0-10
PAIN_FUNCTIONAL_ASSESSMENT: UNABLE TO SELF-REPORT
PAIN_FUNCTIONAL_ASSESSMENT: 0-10

## 2025-02-06 ASSESSMENT — PATIENT HEALTH QUESTIONNAIRE - PHQ9
1. LITTLE INTEREST OR PLEASURE IN DOING THINGS: NOT AT ALL
2. FEELING DOWN, DEPRESSED OR HOPELESS: NOT AT ALL
SUM OF ALL RESPONSES TO PHQ9 QUESTIONS 1 & 2: 0

## 2025-02-06 ASSESSMENT — PAIN DESCRIPTION - DESCRIPTORS: DESCRIPTORS: SHARP

## 2025-02-06 ASSESSMENT — PAIN DESCRIPTION - LOCATION: LOCATION: HIP

## 2025-02-06 ASSESSMENT — PAIN DESCRIPTION - ORIENTATION: ORIENTATION: RIGHT

## 2025-02-06 NOTE — CARE PLAN
The patient's goals for the shift include to remain safe    The clinical goals for the shift include pain control and discharge    Over the shift, the patient did not make progress toward the following goals. Barriers to progression include post surgical. Recommendations to address these barriers include safety precautions.

## 2025-02-06 NOTE — ANESTHESIA POSTPROCEDURE EVALUATION
Patient: Bettina Kowalski    Procedure Summary       Date: 02/06/25 Room / Location: [facility] OR 05 / Virtual [facility] OR    Anesthesia Start: 0744 Anesthesia Stop: 1220    Procedure: Right Hip Total Arthroplasty ~ Anterior Approach (Right: Knee) Diagnosis:       Primary osteoarthritis of right hip      (Primary osteoarthritis of right hip [M16.11])    Surgeons: Beltran Platt MD Responsible Provider: Meron Hogue MD    Anesthesia Type: general ASA Status: 3            Anesthesia Type: general    Vitals Value Taken Time   /60 02/06/25 1301   Temp 36.2 °C (97.2 °F) 02/06/25 1217   Pulse 69 02/06/25 1301   Resp 14 02/06/25 1245   SpO2 96 % 02/06/25 1301   Vitals shown include unfiled device data.    Anesthesia Post Evaluation    Patient location during evaluation: PACU  Patient participation: complete - patient participated  Level of consciousness: awake  Pain management: adequate  Multimodal analgesia pain management approach  Airway patency: patent  Cardiovascular status: hemodynamically stable  Respiratory status: spontaneous ventilation  Hydration status: euvolemic  Postoperative Nausea and Vomiting: none        No notable events documented.

## 2025-02-06 NOTE — OP NOTE
Right Hip Total Arthroplasty Operative Note     Date: 2025  OR Location: [facility] A OR    Name: Bettina Kowalski, : 1960, Age: 64 y.o., MRN: [id number], Sex: female    Diagnosis  Pre-op Diagnosis      * Primary osteoarthritis of right hip [M16.11] Post-op Diagnosis     * Primary osteoarthritis of right hip [M16.11]     Procedures  Right Hip Total Arthroplasty ~ Anterior Approach  24116 - AK ARTHRP ACETBLR/PROX FEM PROSTC AGRFT/ALGRFT      Surgeons      * Beltran Platt - Primary    Resident/Fellow/Other Assistant:  Surgeons and Role:     * Rolan Varghese PA-C - CHAUNCEY First Assist    Staff:   Circulator: Ruben Martinez Person: Alvina Galeana Circulator: Mary Anne    Anesthesia Staff: Anesthesiologist: Meron Hogue MD  CRNA: SAGRARIO Simpson-AVIS    Procedure Summary  Anesthesia: General  ASA: III  Estimated Blood Loss: 500mL  Intra-op Medications:   Administrations occurring from 0730 to 1130 on 25:   Medication Name Total Dose   sterile water irrigation solution 1,000 mL   sodium chloride 0.9 % irrigation solution 2,000 mL   ceFAZolin (Ancef) vial 1 g 2 g   dexAMETHasone (Decadron) injection 4 mg/mL 4 mg   fentaNYL (Sublimaze) injection 50 mcg/mL 100 mcg   HYDROmorphone (Dilaudid) injection 1 mg/mL 1 mg   ketamine injection 50 mg/ 5 mL (10 mg/mL) 50 mg   LR infusion Cannot be calculated   lidocaine (Xylocaine) injection 2 % 80 mg   methocarbamol (Robaxin) injection 1,000 mg   midazolam PF (Versed) injection 1 mg/mL 2 mg   phenylephrine 100 mcg/mL syringe 10 mL (prefilled) 150 mcg   propofol (Diprivan) injection 10 mg/mL 150 mg   rocuronium (ZeMuron) 50 mg/5 mL injection 100 mg   tranexamic acid (Cyklokapron) injection 1,000 mg              Anesthesia Record               Intraprocedure I/O Totals          Intake    Tranexamic Acid 0.00 mL    The total shown is the total volume documented since Anesthesia Start was filed.    LR infusion 1600.00 mL    Total Intake 1600 mL       Output    Urine 0 mL    Est.  Blood Loss 500 mL    Total Output 500 mL       Net    Net Volume 1100 mL          Specimen: No specimens collected              Drains and/or Catheters: * None in log *    Tourniquet Times:         Implants:  Implant Name Type Inv. Item Serial No.  Lot No. LRB No. Used Action   EMPHASYS ACETABULAR SHELL, MULTI-HOLE, 52MM, CEMENTLESS   N/A DEPUY [id number] Right 1 Implanted   BONE CHIPS,  CANCELL 30CC 1.7-10MM - [id number] - [id number] Graft BONE CHIPS,  CANCELL 30CC 1.7-10MM [id number] MUSCULOSKELETAL TRNSPLNT FNDN N/A Right 1 Implanted   SCREW, PINNACLE CANCELL 6.5 X 45 - [id number] Screw SCREW, PINNACLE CANCELL 6.5 X 45  DEPUY [id number] Right 1 Implanted   SCREW CANCELL 6.5 X 15 - [id number] Screw SCREW CANCELL 6.5 X 15  DEPUY [id number] Right 1 Implanted   SCREW CANCELLOUS 6.5 X 20 - [id number] Screw SCREW CANCELLOUS 6.5 X 20  DEPUY [id number] Right 1 Implanted   emphasys polyethylene liner neutral aox 52mm x 40mm Implant   DEPUY [id number] Right 1 Implanted   STEM, ACTIS COLLAR, HIGH, SIZE 5 - [id number] Joint Hip STEM, ACTIS COLLAR, HIGH, SIZE 5  DEPUY [id number] Right 1 Implanted       Physician Narrative  Preoperative Diagnosis: Right Hip Degenerative Joint Disease  Postoperative Diagnosis: Right Hip Degenerative Joint Disease  Procedure: Right Total Hip Arthroplasty  Surgical Approach: Direct Anterior    Task performed by RFNA or Surgical Assistant  Positioning, retraction, closure.    Special Considerations  The patient was referred to a tertiary care center due to the complexity of her pathology. 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    Findings  End stage right hip degenerative joint disease as seen on preoperative imaging.    Preoperative Course  Please see clinic/consult notes for full discussion of history, indications, and treatment alternatives. Briefly, the patient had end stage right hip  degenerative joint disease causing significant symptoms. Unfortunately, conservative options had been exhausted and did not provide satisfactory pain and function levels. Ultimately, the patient and I made a shared decision to proceed with surgery.      Procedure  A first assistant actively participated and was necessary for one or more of the following: opening, exposure and visualization during the case, maintaining hemostasis, wound closure resulting in its safe and expeditious completion. I was immediately available for the entire procedure and was present for all critical portions including exposure, bone preparation, and component implantation.    The patient was identified in the preoperative area. The operative site was marked, informed consent reviewed, and operative procedure confirmed. The patient was taken to the operating room and anesthesia achieved. The patient was positioned on the surgical table with bony prominences well-padded. The operative extremity was sterilely prepped and draped. A surgical time-out was performed to confirm the patient, site, procedure, and administration of TXA and prophylactic antibiotics. New gloves were donned after draping and prior to closure, at which time a fresh closing tray with clean instruments was used. An additional dose of TXA was given when closing the wound.    A 10cm incision was made beginning 2cm lateral and 1cm distal to the anterior superior iliac spine and extending along the tensor fascia nancy. The fascia overlying the tensor fascia nancy muscle was sharply incised 1cm lateral to the interval between the tensor fascia nancy and sartorius. Finger dissection was used to sweep the tensor fascia nancy laterally and away from the fascia covering vastus lateralis and rectus, which was dissected while cauterizing the lateral circumflex vessels. A cobra retractor was placed over superior capsule to retract gluteus minimus/medius superiorly. A second cobra  retractor was placed under the inferior capsule. Pericapsular fat was removed. Rectus reflected head was released off the anterior capsule, and a bent Hohmann retractor was placed over the anterior acetabular rim.  An H capsulotomy was performed and tagged with #1 braided absorbable suture for later closure.    Retractors were moved intracapsular on the femoral neck. A provisional femoral neck osteotomy was made. The femoral head removed.    The acetabulum was exposed with a bent Hohmann retractor on the anterior rim, cobra retractor on the superoposterior rim, and 2 pronged retractor under the JOHNNY. Acetabular soft tissue debris was removed, including foveal and labral tissue. The transverse acetabular ligament was left as a landmark. Using fluoroscopy to aid positioning, the acetabulum was reamed to achieve bleeding bone all around. Notably, there was no breach noted through the medial wall. 30cc of cancellous bone graft (femoral head was not suitable for harvesting) was impacted into the focal recess in the medial wall and reverse reamed. The acetabular shell was impacted under fluoroscopic guidance with appropriate anteversion and inclination. Three acetabular screws were drilled, measured, and inserted. Osteophytes were resected. The corresponding polyethylene liner was impacted and confirmed to be locked in place.    Attention was turned to the femur. After confirming rotation and traction were released, the femur was rotated to neutral, and the femoral hook was placed proximal to the gluteal sling. The hip was externally rotated to 120°, extended, and adducted while a bone hook was used in the canal to ensure the greater trochanter cleared the acetabulum. The femoral hook was placed in its agustin and raised to gently maintain the position of the femur achieved by hand. A 2 prong retractor was placed under the anterior neck near the lesser trochanter. A long bent Hohmann retractor was placed between the  superior capsular flap and the gluteus medius/minimus muscles and tendons. This interval was further developed, superior capsular flap partially removed, and Hohmann moved over tip of greater trochanter, elevating the neck cut and femoral saddle into the wound. The release progressed through the conjoined tendon (obturator internus, superior gemelli, inferior gemelli), then piriformis, then obturator externus only as far as needed, resetting the femur intermittently to achieve more exposure.    A curved awl and bone rasp were used to open the canal, which was then broached. The trial head and neck were placed. The femoral hook was removed, leg brought up, position of broach checked on lateral femur view, and hip reduced. Component sizes, positions, and stability were checked with hip and pelvis imaging, external rotation to 100 degrees with extension, and shuck testing of tissue tension. The hip was dislocated and placed into extension, adduction, and external rotation. Trial femoral components were removed. The wound was irrigated thoroughly with dilute betadine solution then saline.    The real femoral stem was inserted, and axial and rotational stability were confirmed.    A repeat trial reduction confirmed the head length selection. The trunion was cleaned and dried, and the final head was placed and hip reduced. The final sizes, positions, and stability were checked on imaging, external rotation to 100 degrees with extension, and shuck testing of tissue tension. A betadine soak then saline irrigation were performed. Hemostatis was achieved, checking the lateral femoral circumflex vessels.    Capsular flaps were tied together with #1 braided absorbable suture. A periarticular injection was adminsitered in the capsule and about the hip. The fascia overlying the tensor fascia nancy was closed with several interrupted #1 absorbable braided sutures and a #1 running monofilament barbed suture that was continue in  another pass through the adipose layer. The skin was then closed with 2-0 absorbable braided suture and 3-0 nylon vertical mattress due to her habitus and history of rheumatoid arthritis. A sterile dressing and thigh high BRITNI hose were applied. Anesthesia was concluded. The patient was transferred to the stretcher and recovery room in stable condition.     Information and Plan  Nerve block: PENG, periarticular. Notable, general anaesthesia was used given history of lumbar surgery and Chiari malformation.  Complications: None apparent.  Weightbearing status: As tolerated.  Disposition: PACU

## 2025-02-06 NOTE — PROGRESS NOTES
S: LISY since OR. Still somnolent in PACU. Pain controlled. 2 sisters updated by me personally, pleased with care.          O  VS:  Temp:  [36.1 °C (97 °F)-36.5 °C (97.7 °F)] 36.1 °C (97 °F)  Heart Rate:  [69-94] 73  Resp:  [12-18] 14  BP: (105-158)/(60-79) 133/65    Gen: NAD  Focused exam of operative extremity:  -Dressing with minor stains  -SILT in S/S/SP/DP/T/F distributions  -Able to flex Q, TA, GS  -Palpable DP pulse, symmetric to contralateral  -Thigh high TEDs, SCDs in place    Drain  Na  Micro  Na  PT/OT  Pending  Labs (pertinent)  In AM  Imaging  R hip radiographs (AP Pelvis, AP/Lateral) on my read: BARB components in acceptable position with no sign of gross implant/hardware failure.            A&P: Bettina Kowalski is a 64 y.o. female s/p R primary BARB on 2/6/2025.  Abx prophylaxis (does not need to stay for postop dose if otherwise ready to DC)  Activity: WBAT.  Analgesia: Multimodal with breakthrough   Consults: Appreciate therapy, JM, case mgmt, IM  Diet: Routine. DC IVF when adequate PO intake  Dressing: Aquacel/Mepilex for 7d. Patient will remove at home then apply daily dry dressing PRN  DVT ppx: Early mobilization, SCDs, pharmacologic (normal risk (81mg aspirin BID 6wks starting POD0))  BLE swelling: Compression hose  DC: Pending PT/OT

## 2025-02-06 NOTE — ANESTHESIA PROCEDURE NOTES
Airway  Date/Time: 2/6/2025 7:58 AM  Urgency: elective    Airway not difficult    Staffing  Performed: CRNA   Authorized by: Meron Hogue MD    Performed by: SAGRARIO Simpson-AVIS  Patient location during procedure: OR    Indications and Patient Condition  Indications for airway management: anesthesia and airway protection  Spontaneous Ventilation: absent  Sedation level: deep  Preoxygenated: yes  Patient position: sniffing    Planned trial extubation    Final Airway Details  Final airway type: endotracheal airway      Successful airway: ETT  Cuffed: yes   Successful intubation technique: direct laryngoscopy  Facilitating devices/methods: intubating stylet  Endotracheal tube insertion site: oral  Blade: Disha  Placement verified by: chest auscultation and capnometry   Measured from: lips

## 2025-02-06 NOTE — PROGRESS NOTES
Physical Therapy    Physical Therapy Evaluation & Treatment    Patient Name: Bettina Kowalski  MRN: [id number]  Department: [place] [id number]  Room: [facility]  Today's Date: 2/6/2025   Time Calculation  Start Time: 1516  Stop Time: 1607  Time Calculation (min): 51 min    Assessment/Plan   PT Assessment  PT Assessment Results: Decreased strength, Decreased endurance, Impaired balance, Decreased mobility, Impaired sensation, Orthopedic restrictions, Pain  Rehab Prognosis: Good  Barriers to Discharge Home: Physical needs  Physical Needs: Stair navigation into home limited by function/safety, Stair navigation to access bed limited by function/safety, Ambulating household distances limited by function/safety, Intermittent mobility assistance needed, 24hr ADL assistance needed, High falls risk due to function or environment  Evaluation/Treatment Tolerance: Patient limited by pain (Lightheadedness and nausea)  Medical Staff Made Aware: Yes  Strengths: Ability to acquire knowledge, Access to adaptive/assistive products  Barriers to Participation: Comorbidities  End of Session Communication: Bedside nurse  Assessment Comment: Pt presents today with decreased BLE strength, balance, and endurance/activity tolerance. Currently, pt is below the documented PLOF requiring min assist with transfers and short distance ambulation. Continued PT BID would benefit the pt to address the above deficits and attempt stair negotiation to bring the pt closer to the PLOF while reducing fall risk. Pt lives alone and reports no physical assistance available with 2 instances of loss of balance during session. At this time pt is anticipated to benefit from moderate intensity therapy. however, pt demonstrates ability to progress to low recommendation pending progress in transfer, gait, and stair training with PT.  End of Session Patient Position: Bed, 3 rail up, Alarm on   IP OR SWING BED PT PLAN  Inpatient or Swing Bed: Inpatient  PT  Plan  Treatment/Interventions: Bed mobility, Transfer training, Gait training, Stair training, Balance training, Strengthening, Endurance training, Therapeutic exercise, Therapeutic activity, Home exercise program, Postural re-education  PT Plan: Ongoing PT  PT Frequency: BID  PT Discharge Recommendations: Moderate intensity level of continued care, Other (comment) (Potential low recommendation pending progress with PT.)  Equipment Recommended upon Discharge:  (Pt owns a FWW)  PT Recommended Transfer Status: Assist x1, Assistive device  PT - OK to Discharge: Yes (Per PT POC)    Subjective     General Visit Information:  General  Reason for Referral: 64 y.o. F s/p anterior R BARB on 2/6/25.  Referred By: MIGUEL Platt  Past Medical History Relevant to Rehab:   Past Medical History:   Diagnosis Date    Anemia     Asthma     stable    B12 deficiency     Cataract, bilateral     Cerebral herniation (Multi)     Chronic insomnia     Depression     History of blood transfusion 1984    History of Chiari malformation     stable no symptoms    History of prediabetes     Hyperlipidemia     stable without medication    Hypertension     stable without medication    Multinodular goiter     presently no medication    Obesity     Pelvic floor weakness     Prediabetes     Primary osteoarthritis of right hip     Plan: Right Hip Total Arthroplasty Anterior Approach 1/7/25    Right lumbar radiculopathy     Seropositive rheumatoid arthritis      Family/Caregiver Present: Yes  Caregiver Feedback: Sister present and supportive  Prior to Session Communication: Bedside nurse  Patient Position Received: Bed, 3 rail up, Alarm on  Preferred Learning Style: auditory, kinesthetic, visual  General Comment: Pt supine in bed upon PT arrival. Cleared to participate with RN, and agreeable to PT evaluation.  Home Living:  Home Living  Type of Home: House  Lives With: Alone  Home Adaptive Equipment: Walker rolling or standard, Cane, Crutches (FWW)  Home  Layout: Two level, Bed/bath upstairs, 1/2 bath on main level, Laundry in basement, Stairs to alternate level with rails  Alternate Level Stairs-Number of Steps: 17 steps to second level with partial L HR. 14 steps to basement with L HR  Home Access: Stairs to enter without rails  Entrance Stairs-Rails: None  Entrance Stairs-Number of Steps: 4  Bathroom Shower/Tub:  (Pt reports shower with separate tub)  Bathroom Toilet: Standard  Bathroom Equipment: None  Prior Level of Function:  Prior Function Per Pt/Caregiver Report  Level of [place]: Independent with ADLs and functional transfers  Receives Help From: Family (Pt reports no physical assistance available at D/C)  ADL Assistance: Independent  Homemaking Assistance: Needs assistance  Meal Prep:  (Pt reports sister delivers meals)  Laundry: Independent  Cleaning: Independent  Gait: Independent (Pt reports furniture walking in the home and cane usage in the community)  Leisure: Pt enjoys going to [employer]  Hand Dominance: Right  Prior Function Comments: Pt denies recent falls  Precautions:  Precautions  Hearing/Visual Limitations: +Glasses  LE Weight Bearing Status: Weight Bearing as Tolerated (RLE)  Medical Precautions: Fall precautions  Post-Surgical Precautions: Right hip precautions (Anterior hip precautions)     Date/Time Vitals Session Patient Position Pulse Resp SpO2 BP MAP (mmHg)    02/06/25 1516 During PT  Sitting  86  --  --  126/77  --     02/06/25 1517 During PT  Sitting  106  --  --  136/83  101           Vital Signs Comment: Post ambulation trial    Objective   Pain:  Pain Assessment  Pain Assessment: 0-10  0-10 (Numeric) Pain Score: 0 - No pain  Pain Type: Surgical pain  Pain Location: Hip  Pain Orientation: Right  Pain Interventions: Ambulation/increased activity  Response to Interventions: Increase in pain (7/10 pain reported in the R hip. RN notified of pt pain rating)  Cognition:  Cognition  Orientation Level: Oriented X4    General  Assessments:  Activity Tolerance  Activity Tolerance Comments: Lightheadedness with upright activity    Sensation  Sensation Comment: Pt reports n/t in the RLE    Coordination  Movements are Fluid and Coordinated: Yes    Postural Control  Trunk Control: Trunk flexion in standing with FWW support    Static Sitting Balance  Static Sitting-Balance Support: Bilateral upper extremity supported, Feet supported  Static Sitting-Level of Assistance: Close supervision  Static Sitting-Comment/Number of Minutes: At the EOB  Dynamic Sitting Balance  Dynamic Sitting-Balance Support: Bilateral upper extremity supported, Feet supported  Dynamic Sitting-Level of Assistance: Contact guard  Dynamic Sitting-Balance: Trunk control activities    Static Standing Balance  Static Standing-Balance Support: Bilateral upper extremity supported  Static Standing-Level of Assistance: Contact guard  Static Standing-Comment/Number of Minutes: +Gait belt with FWW support  Dynamic Standing Balance  Dynamic Standing-Balance Support: Bilateral upper extremity supported  Dynamic Standing-Level of Assistance: Contact guard, Minimum assistance  Dynamic Standing-Balance: Turning  Dynamic Standing-Comments: Predominantly CGA with instance on min assist to correct LOB  Functional Assessments:  Bed Mobility  Bed Mobility: Yes  Bed Mobility 1  Bed Mobility 1: Supine to sitting  Level of Assistance 1: Modified independent  Bed Mobility Comments 1: Pt able to come to long sitting and self assist the RLE off the EOB. Significantly increased time required to complete without use of bed rails. HOB flat.  Bed Mobility 2  Bed Mobility  2: Sitting to supine  Level of Assistance 2: Contact guard  Bed Mobility Comments 2: HOB flat. Pt able to self assist BLE into bed with decreased trunk control on descent to the bed CGA to trunk for safety.    Transfers  Transfer: Yes  Transfer 1  Transfer From 1: Bed to  Transfer to 1: Stand  Technique 1: Sit to stand  Transfer Device  1: Walker, Gait belt  Transfer Level of Assistance 1: Minimum assistance, Minimal verbal cues  Trials/Comments 1: VC for hand placement on bed instead of FWW for BUE push to stand. Min assist provided to reach full stand.  Transfers 2  Transfer From 2: Stand to  Transfer to 2: Bed  Technique 2: Stand to sit  Transfer Device 2: Walker, Gait belt  Transfer Level of Assistance 2: Minimum assistance, Minimal verbal cues, Minimal tactile cues  Trials/Comments 2: VC for BLE positioning against the bed before attempting to sit. VC/TC for BUE reach for the bed when sitting for controlled descent. Pt demonstrates unilateral UE reach for the bed with contralateral hand resting of FWW.    Stairs  Stairs: No  Extremity/Trunk Assessments:  RLE   RLE : Exceptions to WFL  Strength RLE  RLE Overall Strength: Deficits, Due to pain  LLE   LLE :  (Grossly at least 3-/5 MMT as observed during functional mobility)  Treatments:  Therapeutic Exercise  Therapeutic Exercise Performed: Yes  Therapeutic Exercise Activity 1: Pt completes 1x10 of the following exercises on the RLE: AP, HS, QS, GS, SLR, supine hip abduction, and LAQ. Pt provided with VC and visual demonstration to maximize understanding and technique when performing exercises.  Outcome Measures:  [facility] Basic Mobility  Turning from your back to your side while in a flat bed without using bedrails: None  Moving from lying on your back to sitting on the side of a flat bed without using bedrails: None  Moving to and from bed to chair (including a wheelchair): A little  Standing up from a chair using your arms (e.g. wheelchair or bedside chair): A little  To walk in hospital room: A little  Climbing 3-5 steps with railing: Total  Basic Mobility - Total Score: 18    Encounter Problems       Encounter Problems (Active)       Balance       Goal 1 (Progressing)       Start:  02/06/25    Expected End:  02/20/25       Pt performs all sitting and standing balance mod IND using FWW             Mobility       STG - Patient will ascend and descend a flight of stairs       Start:  02/06/25    Expected End:  02/20/25       With L HR support, LRAD, and supervision            Mobility       STG - Patient will ambulate (Progressing)       Start:  02/06/25    Expected End:  02/20/25       >100 ft mod IND using proper gait mechanics            PT Problem       PT Goal 1 (Progressing)       Start:  02/06/25    Expected End:  02/20/25       Pt demonstrates IND in performing 2 sets of 12 reps of prescribed LE HEP per anterior BARB protocol            PT Transfers       STG - Patient to transfer to and from sit to supine (Progressing)       Start:  02/06/25    Expected End:  02/20/25       IND         STG - Patient will transfer sit to and from stand (Progressing)       Start:  02/06/25    Expected End:  02/20/25       Mod IND using FWW with proper body mechanics              Education Documentation  Handouts, taught by Devon Guerra PT at 2/6/2025  4:38 PM.  Learner: Patient  Readiness: Acceptance  Method: Explanation, Demonstration, Handout  Response: Verbalizes Understanding    Precautions, taught by Devon Guerra PT at 2/6/2025  4:38 PM.  Learner: Patient  Readiness: Acceptance  Method: Explanation, Demonstration, Handout  Response: Verbalizes Understanding    Body Mechanics, taught by Devon Guerra PT at 2/6/2025  4:38 PM.  Learner: Patient  Readiness: Acceptance  Method: Explanation, Demonstration, Handout  Response: Verbalizes Understanding    Home Exercise Program, taught by Devon Guerra PT at 2/6/2025  4:38 PM.  Learner: Patient  Readiness: Acceptance  Method: Explanation, Demonstration, Handout  Response: Verbalizes Understanding    Mobility Training, taught by Devon Guerra PT at 2/6/2025  4:38 PM.  Learner: Patient  Readiness: Acceptance  Method: Explanation, Demonstration, Handout  Response: Verbalizes Understanding

## 2025-02-06 NOTE — ANESTHESIA PREPROCEDURE EVALUATION
Patient: Bettina Kowalski    Procedure Information       Date/Time: 02/06/25 0730    Procedure: Right Hip Total Arthroplasty ~ Anterior Approach (Right: Knee) - PENG & PV/FI/QLL -not femoral    Location: [facility] / [facility]    Surgeons: Beltran Platt MD                                                         Pre-Anesthesia Evaluation                                         Bettina Kowalski is a 64 y.o. female who presents for the above mentioned procedure due to Primary osteoarthritis of right hip [M16.11]    Past Medical History:   Diagnosis Date    Anemia     Asthma     stable    B12 deficiency     Cataract, bilateral     Cerebral herniation (Multi)     Chronic insomnia     Depression     History of blood transfusion 1984    History of Chiari malformation     stable no symptoms    History of prediabetes     Hyperlipidemia     stable without medication    Hypertension     stable without medication    Multinodular goiter     presently no medication    Obesity     Pelvic floor weakness     Prediabetes     Primary osteoarthritis of right hip     Plan: Right Hip Total Arthroplasty Anterior Approach 1/7/25    Right lumbar radiculopathy     Seropositive rheumatoid arthritis      Past Surgical History:   Procedure Laterality Date    COLONOSCOPY      HYSTERECTOMY  06/17/2009    LUMBAR FUSION      OTHER SURGICAL HISTORY      Skull Excision Subfascial Soft Tissue Tumor    TOTAL KNEE ARTHROPLASTY Left     TRANSOBTURATOR SLING       Social History   She reports that she quit smoking about 40 years ago. Her smoking use included cigarettes. She started smoking about 50 years ago. She has a 20 pack-year smoking history. She has never used smokeless tobacco. She reports that she does not drink alcohol and does not use drugs.    Allergies and Medications   Allergies   Allergen Reactions    Gabapentin Hallucinations    Metronidazole Hives       Administrations This Visit       acetaminophen (Tylenol) tablet 975 mg       Admin  "Date  02/06/2025 Action  Given Dose  975 mg Route  oral Documented By  Rosalba Reis RN              celecoxib (CeleBREX) capsule 200 mg       Admin Date  02/06/2025 Action  Given Dose  200 mg Route  oral Documented By  Rosalba Reis RN              povidone-iodine 5 % kit kit       Admin Date  02/06/2025 Action  Given Dose  1 Application Route  Topical Documented By  Rosalba Reis RN              scopolamine (Transderm-Scop) patch 1 patch       Admin Date  02/06/2025 Action  Medication Applied Dose  1 patch Route  transdermal Documented By  Rosalba Reis RN                   Current Outpatient Medications   Medication Instructions    acetaminophen (TYLENOL 8 HOUR) 650 mg, Every 8 hours PRN    acetaminophen (TYLENOL) 1,000 mg, oral, Every 8 hours PRN, For the first 2 weeks after surgery, take every 8 hours. After that, take every 8 hours only if needed.    albuterol 90 mcg/actuation inhaler 2 puffs, inhalation, Every 4 hours PRN    aspirin 81 mg, oral, 2 times daily    BD Ultra-Fine Mini Pen Needle 31 gauge x 3/16\" needle     celecoxib (CELEBREX) 200 mg, oral, 2 times daily, For the first 4 weeks after surgery, take twice per day every day.    docusate sodium (COLACE) 100 mg, oral, 2 times daily, Take the entire time you are taking tramadol and oxycodone. After that, you may stop.    metFORMIN (GLUCOPHAGE) 500 mg, oral, 2 times daily (morning and late afternoon)    methocarbamol (ROBAXIN) 500 mg, oral, 3 times daily PRN    NON FORMULARY Disability parking placard needed for permanent lifetime orthopaedic disability.    oxyCODONE (ROXICODONE) 5 mg, oral, Every 6 hours PRN    pantoprazole (PROTONIX) 40 mg, oral, Daily before breakfast, Do not crush, chew, or split. Take the entire time you are taking your blood thinning and pain medications. After that, you may stop.    polyethylene glycol (GLYCOLAX, MIRALAX) 17 g, oral, Daily, Mix 1 cap (17g) into 8 ounces of fluid. Take the entire time you are taking " tramadol and oxycodone. After that, you may stop.    rosuvastatin (CRESTOR) 20 mg, oral, Daily    traMADol (ULTRAM) 50 mg, oral, Every 6 hours PRN       Recent Labs     12/27/24  1129 11/18/24  1438 10/31/24  1240   WBC 6.4  --  6.2   HGB 12.8 11.9* 12.7   HCT 39.4 38.3 40.9     --  300   MCV 88  --  93     Recent Labs     12/05/22  1753 11/03/21  1555   PROTIME 12.8 13.5*   INR 1.1 1.2*   APTT  --  31     Lab Results   Component Value Date    ABO O 12/15/2021       Recent Labs     12/27/24  1129 10/31/24  1240 05/17/24  1654   EGFR 87 86 88   ANIONGAP 13 13 16   BUN 15 11 13   CREATININE 0.76 0.77 0.75    144 141   K 4.1 4.2 4.3    105 102   CO2 27 30 27   GLUCOSE 91 109* 87     Recent Labs     11/18/24  1438 10/31/24  1240 05/17/24  1654   PROT  --  7.7 7.8   ALBUMIN 4.3 4.5 4.7   BILITOT  --  0.8 0.5   ALKPHOS  --  102 96   ALT  --  16 21   AST  --  19 22     Recent Labs     10/31/24  1240 12/13/23  0827   HGBA1C 5.7* 5.6   TSH 0.65 1.09     Recent Labs     12/27/24  1129 10/31/24  1240 12/15/21  1406 11/10/21  0847   CALCIUM 10.2 10.5   < > 9.1   PHOS  --   --   --  2.9    < > = values in this interval not displayed.         Lab Results   Component Value Date    STAPHMRSASCR No Staphylococcus aureus isolated 12/27/2024         Encounter Date: 01/02/25   ECG 12 lead (Clinic Performed)    Narrative    NSR with nonspecific STT wave changes        Transthoracic Echo (TTE) Complete 01/13/2025  1. Left ventricular ejection fraction is normal, by visual estimate at 65-70%.  2. There is normal right ventricular global systolic function.  3. Right ventricular systolic pressure is within normal limits.    Ejection Fraction   LV EF   Date/Time Value Ref Range Status   01/13/2025 02:54 PM 68 %        Nuclear Stress Test 1/13/25  Impression  1. Negative myocardial perfusion study without evidence of inducible  myocardial ischemia or prior infarction.  2. The left ventricle is normal in size.  3. Normal LV  "wall motion with a post-stress LV EF estimated greater  than 65%.    Visit Vitals  /79   Pulse 80   Temp 36.5 °C (97.7 °F) (Temporal)   Resp 17   Ht 1.626 m (5' 4\")   Wt 79 kg (174 lb 2.6 oz)   SpO2 99%   BMI 29.90 kg/m²   OB Status Postmenopausal   Smoking Status Former   BSA 1.89 m²     Medical Gas Therapy: None (Room air)          2/6/2025     6:29 AM 1/2/2025     9:00 AM 12/27/2024    10:39 AM 12/14/2024     4:16 PM 12/14/2024     3:45 PM 12/14/2024     3:15 PM 12/14/2024     3:00 PM   BP REVIEW   BP (ultimate) 158/79 160/92 152/69 160/84 162/78 175/77 133/87               Relevant Problems   Anesthesia (within normal limits)      Cardiac   (+) Chest pain   (+) Hyperlipidemia   (+) Hypertension      Pulmonary   (+) Asthma   (+) Short of breath on exertion      Neuro   (+) Depression   (+) History of Chiari malformation   (+) Mild major depression (CMS-HCC)   (+) Right lumbar radiculopathy   (+) Sciatica of right side      Endocrine   (+) History of prediabetes   (+) Multinodular goiter   (+) Obesity      Hematology   (+) Anemia   (+) History of blood transfusion      Musculoskeletal   (+) Generalized osteoarthritis of multiple sites   (+) Osteoarthritis of knee   (+) Primary localized osteoarthrosis of left lower leg   (+) Primary osteoarthritis of right hip   (+) Seropositive rheumatoid arthritis   (+) Spinal stenosis of lumbar region with neurogenic claudication      Nervous   (+) Arnold-Chiari syndrome without spina bifida or hydrocephalus (Multi)   (+) Cerebral herniation (Multi)   (+) S/P lumbar spinal fusion       Clinical information reviewed:   Tobacco  Allergies  Meds   Med Hx  Surg Hx  OB Status  Fam Hx  Soc   Hx        NPO Detail:  NPO/Void Status  Carbohydrate Drink Given Prior to Surgery? : N  Date of Last Liquid: 02/05/25  Time of Last Liquid: 2230  Date of Last Solid: 02/05/25  Time of Last Solid: 1230  Last Intake Type: Clear fluids  Time of Last Void: 0600         Physical " Exam    Airway  Mallampati: II  TM distance: >3 FB  Neck ROM: full     Cardiovascular   Rhythm: regular  Rate: normal     Dental - normal exam     Pulmonary   Comments: Normal RR  Non-labored respiration    Abdominal            Anesthesia Plan    History of general anesthesia?: yes  History of complications of general anesthesia?: no    ASA 3     general     The patient is not a current smoker.  Patient did not smoke on day of procedure.    intravenous induction   Postoperative administration of opioids is intended.  Anesthetic plan and risks discussed with patient and sibling.  Use of blood products discussed with patient who consented to blood products.    Plan discussed with CRNA.

## 2025-02-06 NOTE — H&P
"BARB/TKA Related Summary           L hip: N  L knee  3/30/2024: Primary TKA (Dr. Myles at )  R hip: N  R knee: N  Lumbar surgery or significant pathology  11/8/2021: L3-5 lami/fusion, L4-5 TLIF (Dr. Ayala at  [facility]). Doing well.            CC/SUBJECTIVE/HPI            Referring provider: No ref. provider found  Bettina Kowalski is a 64 y.o. female presenting for planned right total hip arthroplasty.  Please see 11/18/2024 consultation note for full details.          HISTORIES            Patient reports no major changes in health, substance use, or baseline medications since last visit.            OBJECTIVE            Physical exam  Estimated body mass index is 29.9 kg/m² as calculated from the following:    Height as of this encounter: 1.626 m (5' 4\").    Weight as of this encounter: 79 kg (174 lb 2.6 oz).  Gen/psych: NAD, conversational, appropriate    Ambulation  Gait: antalgic  Assistive device: cane  Spine: Unchnged     Focused MSK exam: R  Hip  Trendelenberg test: Unable to tolerate due to pain  Skin/incision: normal in area of planned/possible incision (DA approach)   Tenderness: proximal thigh and groin  Pain with log roll: pos  Stinchfield: pos  ROM: limited, painful  Flexion: 90º  IR: <10º  ER: 30º  Abd: 20º  Neurovascular             Strength: 5/5 hip/knee/ankle flexion and extension  Sensory (L2-S1): SILT throughout lower extremity  Edema/stasis: no pitting edema  Pulse: DP 1+, PT 1+     Leg lengths: R short ~1cm obj, subjl    Imaging  No new imaging.            ASSESSMENT & PLAN           Patient verbalized understanding of below A&P. All questions answered.  R hip DJD  Logistics (day of surgery timing, etc) covered in Preop Joints Class.  Planned incision site discussed  Proceed with surgery  Surgical risks discussion: See consent.  PMH, PSH, other considerations discussed  Chiari malformation  Seropositive RA not on antirheumatic medications, follows with rheumatology  DM and elective arthroplasty " req A1c <7.5 (5.7 on 10/31/2024)  L3-5 fusion but good lumbar mobility on exam today, DM likely not necessary  BMI<40 but on spectrum of increased risk of surgical complications.  SDD candidate but would like to stay  Listed in EMR but not endorsed by pt or evident on exam today: Leg swelling, R lumbar radiculopathy and neurogenic claudication resolved with previous spine surgery, L UKA (was TKA)  Referred by Dr. Myles.  She met with my existing patient, Love Augustine, to talk about her experience with BARB  Note: Sister works here at   Occupation: Mortician (retiring soon)  Hobbies: Travel  PCP: Christopher D'Amico, DO

## 2025-02-06 NOTE — ANESTHESIA PROCEDURE NOTES
Peripheral Block    Patient location during procedure: pre-op  Start time: 2/6/2025 7:17 AM  End time: 2/6/2025 7:23 AM  Reason for block: at surgeon's request and post-op pain management  Staffing  Performed: attending   Authorized by: Meron Hogue MD    Performed by: Meron Hogue MD  Preanesthetic Checklist  Completed: patient identified, IV checked, site marked, risks and benefits discussed, surgical consent, monitors and equipment checked, pre-op evaluation and timeout performed   Timeout performed at: 2/6/2025 7:14 AM  Peripheral Block  Patient position: laying flat  Prep: ChloraPrep and site prepped and draped  Patient monitoring: continuous pulse ox, heart rate and cardiac monitor  Block type: other  Laterality: right  Injection technique: single-shot  Guidance: ultrasound guided  Local infiltration: lidocaine  Infiltration strength: 1 %  Dose: 3 mL  Needle  Needle type: short-bevel   Needle gauge: 22 G  Needle length: 8 cm  Needle localization: ultrasound guidance and anatomical landmarks  Test dose: negative  Assessment  Injection assessment: negative aspiration for heme, no paresthesia on injection and incremental injection  Heart rate change: no  Slow fractionated injection: yes  Additional Notes  Pre-medication with Versed 2 mg and fentanyl 100 mcg intravenously.  Verbal consent was provided by the patient and/or surrogate decision maker for PENG block. Anticoagulation (if any) was held per UTE guidelines. 25 ml of 0.375% Bupivacaine with epi 5 mcg/ml, and decadron 4 mg was injected in 5 ml increments. Post procedure vital signs were stable and no immediate complications.were noted.

## 2025-02-07 ENCOUNTER — DOCUMENTATION (OUTPATIENT)
Dept: HOME HEALTH SERVICES | Facility: HOME HEALTH | Age: 65
End: 2025-02-07
Payer: MEDICARE

## 2025-02-07 ENCOUNTER — PHARMACY VISIT (OUTPATIENT)
Dept: PHARMACY | Facility: CLINIC | Age: 65
End: 2025-02-07
Payer: COMMERCIAL

## 2025-02-07 VITALS
BODY MASS INDEX: 29.73 KG/M2 | HEIGHT: 64 IN | HEART RATE: 71 BPM | DIASTOLIC BLOOD PRESSURE: 57 MMHG | WEIGHT: 174.16 LBS | RESPIRATION RATE: 17 BRPM | TEMPERATURE: 97.7 F | SYSTOLIC BLOOD PRESSURE: 103 MMHG | OXYGEN SATURATION: 97 %

## 2025-02-07 LAB
ANION GAP SERPL CALC-SCNC: 10 MMOL/L (ref 10–20)
BUN SERPL-MCNC: 16 MG/DL (ref 6–23)
CALCIUM SERPL-MCNC: 8.7 MG/DL (ref 8.6–10.3)
CHLORIDE SERPL-SCNC: 107 MMOL/L (ref 98–107)
CO2 SERPL-SCNC: 25 MMOL/L (ref 21–32)
CREAT SERPL-MCNC: 0.79 MG/DL (ref 0.5–1.05)
EGFRCR SERPLBLD CKD-EPI 2021: 83 ML/MIN/1.73M*2
ERYTHROCYTE [DISTWIDTH] IN BLOOD BY AUTOMATED COUNT: 16.3 % (ref 11.5–14.5)
GLUCOSE SERPL-MCNC: 145 MG/DL (ref 74–99)
HCT VFR BLD AUTO: 26.7 % (ref 36–46)
HGB BLD-MCNC: 8.7 G/DL (ref 12–16)
MCH RBC QN AUTO: 29.5 PG (ref 26–34)
MCHC RBC AUTO-ENTMCNC: 32.6 G/DL (ref 32–36)
MCV RBC AUTO: 91 FL (ref 80–100)
NRBC BLD-RTO: 0 /100 WBCS (ref 0–0)
PLATELET # BLD AUTO: 185 X10*3/UL (ref 150–450)
POTASSIUM SERPL-SCNC: 4.4 MMOL/L (ref 3.5–5.3)
RBC # BLD AUTO: 2.95 X10*6/UL (ref 4–5.2)
SODIUM SERPL-SCNC: 138 MMOL/L (ref 136–145)
WBC # BLD AUTO: 13.3 X10*3/UL (ref 4.4–11.3)

## 2025-02-07 PROCEDURE — 2500000002 HC RX 250 W HCPCS SELF ADMINISTERED DRUGS (ALT 637 FOR MEDICARE OP, ALT 636 FOR OP/ED): Performed by: STUDENT IN AN ORGANIZED HEALTH CARE EDUCATION/TRAINING PROGRAM

## 2025-02-07 PROCEDURE — 2500000004 HC RX 250 GENERAL PHARMACY W/ HCPCS (ALT 636 FOR OP/ED): Performed by: STUDENT IN AN ORGANIZED HEALTH CARE EDUCATION/TRAINING PROGRAM

## 2025-02-07 PROCEDURE — 2500000005 HC RX 250 GENERAL PHARMACY W/O HCPCS: Performed by: STUDENT IN AN ORGANIZED HEALTH CARE EDUCATION/TRAINING PROGRAM

## 2025-02-07 PROCEDURE — 97110 THERAPEUTIC EXERCISES: CPT | Mod: GP,CQ

## 2025-02-07 PROCEDURE — 2500000001 HC RX 250 WO HCPCS SELF ADMINISTERED DRUGS (ALT 637 FOR MEDICARE OP): Performed by: STUDENT IN AN ORGANIZED HEALTH CARE EDUCATION/TRAINING PROGRAM

## 2025-02-07 PROCEDURE — 80048 BASIC METABOLIC PNL TOTAL CA: CPT | Performed by: STUDENT IN AN ORGANIZED HEALTH CARE EDUCATION/TRAINING PROGRAM

## 2025-02-07 PROCEDURE — 7100000011 HC EXTENDED STAY RECOVERY HOURLY - NURSING UNIT

## 2025-02-07 PROCEDURE — 97530 THERAPEUTIC ACTIVITIES: CPT | Mod: GP,CQ

## 2025-02-07 PROCEDURE — 85027 COMPLETE CBC AUTOMATED: CPT | Performed by: STUDENT IN AN ORGANIZED HEALTH CARE EDUCATION/TRAINING PROGRAM

## 2025-02-07 PROCEDURE — 36415 COLL VENOUS BLD VENIPUNCTURE: CPT | Performed by: STUDENT IN AN ORGANIZED HEALTH CARE EDUCATION/TRAINING PROGRAM

## 2025-02-07 RX ADMIN — TRANEXAMIC ACID 1950 MG: 650 TABLET ORAL at 06:34

## 2025-02-07 RX ADMIN — OXYCODONE HYDROCHLORIDE 10 MG: 5 TABLET ORAL at 00:03

## 2025-02-07 RX ADMIN — ROSUVASTATIN 20 MG: 20 TABLET, FILM COATED ORAL at 08:28

## 2025-02-07 RX ADMIN — CEFADROXIL 500 MG: 500 CAPSULE ORAL at 08:27

## 2025-02-07 RX ADMIN — DOCUSATE SODIUM 100 MG: 100 CAPSULE, LIQUID FILLED ORAL at 04:10

## 2025-02-07 RX ADMIN — PANTOPRAZOLE SODIUM 40 MG: 40 TABLET, DELAYED RELEASE ORAL at 06:19

## 2025-02-07 RX ADMIN — METFORMIN HYDROCHLORIDE 500 MG: 500 TABLET ORAL at 08:26

## 2025-02-07 RX ADMIN — OXYCODONE HYDROCHLORIDE 10 MG: 5 TABLET ORAL at 16:12

## 2025-02-07 RX ADMIN — KETOROLAC TROMETHAMINE 15 MG: 30 INJECTION, SOLUTION INTRAMUSCULAR at 06:16

## 2025-02-07 RX ADMIN — POLYETHYLENE GLYCOL 3350 17 G: 17 POWDER, FOR SOLUTION ORAL at 08:28

## 2025-02-07 RX ADMIN — ASPIRIN 81 MG: 81 TABLET, COATED ORAL at 04:10

## 2025-02-07 RX ADMIN — CEFAZOLIN SODIUM 2 G: 2 INJECTION, SOLUTION INTRAVENOUS at 04:10

## 2025-02-07 RX ADMIN — KETOROLAC TROMETHAMINE 15 MG: 30 INJECTION, SOLUTION INTRAMUSCULAR at 00:03

## 2025-02-07 RX ADMIN — LIDOCAINE 4% 1 PATCH: 40 PATCH TOPICAL at 08:28

## 2025-02-07 RX ADMIN — OXYCODONE HYDROCHLORIDE 10 MG: 5 TABLET ORAL at 08:27

## 2025-02-07 SDOH — ECONOMIC STABILITY: FOOD INSECURITY: WITHIN THE PAST 12 MONTHS, THE FOOD YOU BOUGHT JUST DIDN'T LAST AND YOU DIDN'T HAVE MONEY TO GET MORE.: NEVER TRUE

## 2025-02-07 SDOH — HEALTH STABILITY: MENTAL HEALTH: HOW OFTEN DO YOU HAVE A DRINK CONTAINING ALCOHOL?: NEVER

## 2025-02-07 SDOH — SOCIAL STABILITY: SOCIAL INSECURITY: ARE YOU MARRIED, WIDOWED, DIVORCED, SEPARATED, NEVER MARRIED, OR LIVING WITH A PARTNER?: WIDOWED

## 2025-02-07 SDOH — HEALTH STABILITY: MENTAL HEALTH
DO YOU FEEL STRESS - TENSE, RESTLESS, NERVOUS, OR ANXIOUS, OR UNABLE TO SLEEP AT NIGHT BECAUSE YOUR MIND IS TROUBLED ALL THE TIME - THESE DAYS?: NOT AT ALL

## 2025-02-07 SDOH — ECONOMIC STABILITY: HOUSING INSECURITY: IN THE LAST 12 MONTHS, WAS THERE A TIME WHEN YOU WERE NOT ABLE TO PAY THE MORTGAGE OR RENT ON TIME?: NO

## 2025-02-07 SDOH — ECONOMIC STABILITY: INCOME INSECURITY: IN THE PAST 12 MONTHS HAS THE ELECTRIC, GAS, OIL, OR WATER COMPANY THREATENED TO SHUT OFF SERVICES IN YOUR HOME?: NO

## 2025-02-07 SDOH — HEALTH STABILITY: MENTAL HEALTH: HOW MANY DRINKS CONTAINING ALCOHOL DO YOU HAVE ON A TYPICAL DAY WHEN YOU ARE DRINKING?: PATIENT DOES NOT DRINK

## 2025-02-07 SDOH — ECONOMIC STABILITY: HOUSING INSECURITY: AT ANY TIME IN THE PAST 12 MONTHS, WERE YOU HOMELESS OR LIVING IN A SHELTER (INCLUDING NOW)?: NO

## 2025-02-07 SDOH — HEALTH STABILITY: MENTAL HEALTH: HOW OFTEN DO YOU HAVE SIX OR MORE DRINKS ON ONE OCCASION?: NEVER

## 2025-02-07 SDOH — ECONOMIC STABILITY: TRANSPORTATION INSECURITY: IN THE PAST 12 MONTHS, HAS LACK OF TRANSPORTATION KEPT YOU FROM MEDICAL APPOINTMENTS OR FROM GETTING MEDICATIONS?: NO

## 2025-02-07 SDOH — ECONOMIC STABILITY: FOOD INSECURITY: HOW HARD IS IT FOR YOU TO PAY FOR THE VERY BASICS LIKE FOOD, HOUSING, MEDICAL CARE, AND HEATING?: NOT VERY HARD

## 2025-02-07 SDOH — ECONOMIC STABILITY: HOUSING INSECURITY: IN THE PAST 12 MONTHS, HOW MANY TIMES HAVE YOU MOVED WHERE YOU WERE LIVING?: 0

## 2025-02-07 ASSESSMENT — PAIN - FUNCTIONAL ASSESSMENT
PAIN_FUNCTIONAL_ASSESSMENT: 0-10
PAIN_FUNCTIONAL_ASSESSMENT: UNABLE TO SELF-REPORT
PAIN_FUNCTIONAL_ASSESSMENT: 0-10
PAIN_FUNCTIONAL_ASSESSMENT: 0-10

## 2025-02-07 ASSESSMENT — PAIN DESCRIPTION - ORIENTATION
ORIENTATION: RIGHT
ORIENTATION: RIGHT

## 2025-02-07 ASSESSMENT — PAIN DESCRIPTION - LOCATION
LOCATION: HIP
LOCATION: HIP

## 2025-02-07 ASSESSMENT — COGNITIVE AND FUNCTIONAL STATUS - GENERAL
DRESSING REGULAR UPPER BODY CLOTHING: A LITTLE
EATING MEALS: A LITTLE
MOVING FROM LYING ON BACK TO SITTING ON SIDE OF FLAT BED WITH BEDRAILS: A LITTLE
DRESSING REGULAR LOWER BODY CLOTHING: A LITTLE
STANDING UP FROM CHAIR USING ARMS: A LITTLE
CLIMB 3 TO 5 STEPS WITH RAILING: A LITTLE
MOVING TO AND FROM BED TO CHAIR: A LITTLE
TURNING FROM BACK TO SIDE WHILE IN FLAT BAD: A LITTLE
STANDING UP FROM CHAIR USING ARMS: A LITTLE
PERSONAL GROOMING: A LITTLE
HELP NEEDED FOR BATHING: A LITTLE
HELP NEEDED FOR BATHING: A LITTLE
DAILY ACTIVITIY SCORE: 18
MOVING TO AND FROM BED TO CHAIR: A LITTLE
MOVING FROM LYING ON BACK TO SITTING ON SIDE OF FLAT BED WITH BEDRAILS: A LITTLE
STANDING UP FROM CHAIR USING ARMS: A LITTLE
WALKING IN HOSPITAL ROOM: A LITTLE
TOILETING: A LITTLE
MOVING TO AND FROM BED TO CHAIR: A LITTLE
DRESSING REGULAR LOWER BODY CLOTHING: A LITTLE
TURNING FROM BACK TO SIDE WHILE IN FLAT BAD: A LITTLE
TURNING FROM BACK TO SIDE WHILE IN FLAT BAD: A LITTLE
PERSONAL GROOMING: A LITTLE
CLIMB 3 TO 5 STEPS WITH RAILING: A LOT
DRESSING REGULAR UPPER BODY CLOTHING: A LITTLE
HELP NEEDED FOR BATHING: A LITTLE
MOBILITY SCORE: 18
MOVING TO AND FROM BED TO CHAIR: A LITTLE
MOVING TO AND FROM BED TO CHAIR: A LITTLE
MOBILITY SCORE: 19
DRESSING REGULAR LOWER BODY CLOTHING: A LITTLE
CLIMB 3 TO 5 STEPS WITH RAILING: A LITTLE
DAILY ACTIVITIY SCORE: 18
WALKING IN HOSPITAL ROOM: A LITTLE
WALKING IN HOSPITAL ROOM: A LITTLE
EATING MEALS: A LITTLE
STANDING UP FROM CHAIR USING ARMS: A LITTLE
DAILY ACTIVITIY SCORE: 18
DRESSING REGULAR UPPER BODY CLOTHING: A LITTLE
CLIMB 3 TO 5 STEPS WITH RAILING: A LITTLE
MOBILITY SCORE: 18
TOILETING: A LITTLE
STANDING UP FROM CHAIR USING ARMS: A LITTLE
CLIMB 3 TO 5 STEPS WITH RAILING: A LITTLE
MOBILITY SCORE: 20
MOBILITY SCORE: 18
WALKING IN HOSPITAL ROOM: A LITTLE
MOVING FROM LYING ON BACK TO SITTING ON SIDE OF FLAT BED WITH BEDRAILS: A LITTLE
WALKING IN HOSPITAL ROOM: A LITTLE
TOILETING: A LITTLE
PERSONAL GROOMING: A LITTLE
EATING MEALS: A LITTLE

## 2025-02-07 ASSESSMENT — PAIN SCALES - GENERAL
PAINLEVEL_OUTOF10: 7
PAINLEVEL_OUTOF10: 7
PAINLEVEL_OUTOF10: 3
PAINLEVEL_OUTOF10: 7
PAINLEVEL_OUTOF10: 7

## 2025-02-07 ASSESSMENT — LIFESTYLE VARIABLES
AUDIT-C TOTAL SCORE: 0
SKIP TO QUESTIONS 9-10: 1

## 2025-02-07 ASSESSMENT — ACTIVITIES OF DAILY LIVING (ADL): LACK_OF_TRANSPORTATION: NO

## 2025-02-07 NOTE — NURSING NOTE
Met with Patient and Care Partner at bedside- Patient is s/p Right Anterior Hip Replacement with Dr. Zelaya.  Discussion with patient included education on the following topics: TJR Education: Wound Care (Bandage Care & Removal, Personal Hygiene & Infection Prevention), Post-Op Activity (Home PT Regimen, Movement Precautions, Assistive Equipment & 1-2hr Movement), Post-Op Precautions (Falls, Blood Clots & Constipation), Cold-Therapy (Ice vs. Cold Therapy Machines) , Methods for Symptom Management (Pain, Nausea, Swelling & Constipation), Importance of Post-op Prescriptions, How to Obtain Medication Refills, When to Resume Driving & Who to Call, Use of [employer] & Staff Contact Information, When to call 9-1-1, and When to call the Surgeon's Office.  Patient completed online joint class prior to surgery.  Patient is able to verbalize understanding of class content/discussion.  Contact information was provided to patient for support and assistance during the post-operative period.

## 2025-02-07 NOTE — HH CARE COORDINATION
Home Care received a Referral for Physical Therapy and Occupational Therapy. We have processed the referral for a Start of Care on 24 hrs.     If you have any questions or concerns, please feel free to contact us at [phone]. Follow the prompts, enter your five digit zip code, and you will be directed to your care team on [facility].

## 2025-02-07 NOTE — NURSING NOTE

## 2025-02-07 NOTE — PROGRESS NOTES
Physical Therapy    Physical Therapy Treatment    Patient Name: Bettina Kowalski  MRN: [id number]  Department: [place] [address]  Room: [address] [place] [address]  Today's Date: 2/7/2025  Time Calculation  Start Time: 0856  Stop Time: 0928  Time Calculation (min): 32 min         Assessment/Plan   PT Assessment  End of Session Communication: Bedside nurse  Assessment Comment: Pt session focused on review of HEP, hip precautions and ambulation. Functional mobility limited by increase in pain.  End of Session Patient Position: Up in chair, Alarm on  PT Plan  Inpatient/Swing Bed or Outpatient: Inpatient  PT Plan  Treatment/Interventions: Bed mobility, Transfer training, Gait training, Stair training  PT Plan: Ongoing PT  PT Frequency: BID  PT Discharge Recommendations: Moderate intensity level of continued care, Other (comment)  Equipment Recommended upon Discharge:  (Pt owns a FWW)  PT Recommended Transfer Status: Assist x1, Assistive device  PT - OK to Discharge: Yes (Per PT POC.)      General Visit Information:   PT  Visit  PT Received On: 02/07/25  General  Reason for Referral: 64 y.o. F s/p anterior R BARB on 2/6/25.  Referred By: MIGUEL Platt  Prior to Session Communication: Bedside nurse  Patient Position Received: Bed, 3 rail up, Alarm on  Preferred Learning Style: auditory, kinesthetic, visual  General Comment: Pt supine in bed upon arrival, agreeable to PT session.    Subjective   Precautions:         Vital Signs Comment: Post ambulation trial     Objective   Pain:  Pain Assessment  Pain Assessment: 0-10  0-10 (Numeric) Pain Score: 7  Pain Location: Hip  Pain Orientation: Right  Pain Interventions: Medication (See MAR), Repositioned (RN notified of patients current pain level.)    Treatments:  Therapeutic Exercise  Therapeutic Exercise Performed: Yes  Therapeutic Exercise Activity 1: Pt completes 1x10 of the following exercises on the RLE: AP, HS, QS, GS, SLR, supine hip abduction, and SAQ.              Bed Mobility  Bed Mobility: Yes  Bed  "Mobility 1  Bed Mobility 1: Supine to sitting  Level of Assistance 1: Modified independent  Bed Mobility Comments 1: HOB elevated. Pt required increased time and effort for performance.    Ambulation/Gait Training  Ambulation/Gait Training Performed: Yes  Ambulation/Gait Training 1  Surface 1: Level tile  Device 1: Rolling walker  Gait Support Devices: Gait belt  Assistance 1: Contact guard, Minimum assistance, Minimal verbal cues  Quality of Gait 1: Narrow base of support, Forward flexed posture, Decreased step length, Antalgic  Comments/Distance (ft) 1: 25'x2 (Completed with slow kathleen due to current pain level. Pt reported \"sciatic pain\" casuing increase in pain.)  Transfers  Transfer: Yes  Transfer 1  Transfer From 1: Bed to  Transfer to 1: Stand  Technique 1: Sit to stand  Transfer Device 1: Walker, Gait belt  Transfer Level of Assistance 1: Contact guard  Trials/Comments 1: x1 Trial    Stairs  Stairs: No (Requested to perform stairs at a later time due to current pain level.)    Outcome Measures:  [facility] Basic Mobility  Turning from your back to your side while in a flat bed without using bedrails: None  Moving from lying on your back to sitting on the side of a flat bed without using bedrails: None  Moving to and from bed to chair (including a wheelchair): A little  Standing up from a chair using your arms (e.g. wheelchair or bedside chair): A little  To walk in hospital room: A little  Climbing 3-5 steps with railing: A lot  Basic Mobility - Total Score: 19    Education Documentation  Handouts, taught by Mayra Qureshi PTA at 2/7/2025  2:39 PM.  Learner: Patient  Readiness: Acceptance  Method: Explanation  Response: Verbalizes Understanding    Precautions, taught by Mayra Qureshi PTA at 2/7/2025  2:39 PM.  Learner: Patient  Readiness: Acceptance  Method: Explanation  Response: Verbalizes Understanding    Body Mechanics, taught by Mayra Qureshi PTA at 2/7/2025  2:39 PM.  Learner: Patient  Readiness: " Acceptance  Method: Explanation  Response: Verbalizes Understanding    Home Exercise Program, taught by Mayra Qureshi PTA at 2/7/2025  2:39 PM.  Learner: Patient  Readiness: Acceptance  Method: Explanation  Response: Verbalizes Understanding    Mobility Training, taught by Mayra Qureshi PTA at 2/7/2025  2:39 PM.  Learner: Patient  Readiness: Acceptance  Method: Explanation  Response: Verbalizes Understanding    Education Comments  No comments found.        OP EDUCATION:       Encounter Problems       Encounter Problems (Active)       Balance       Goal 1 (Progressing)       Start:  02/06/25    Expected End:  02/20/25       Pt performs all sitting and standing balance mod IND using FWW            Mobility       STG - Patient will ascend and descend a flight of stairs       Start:  02/06/25    Expected End:  02/20/25       With L HR support, LRAD, and supervision            Mobility       STG - Patient will ambulate (Progressing)       Start:  02/06/25    Expected End:  02/20/25       >100 ft mod IND using proper gait mechanics            PT Problem       PT Goal 1 (Progressing)       Start:  02/06/25    Expected End:  02/20/25       Pt demonstrates IND in performing 2 sets of 12 reps of prescribed LE HEP per anterior BARB protocol            PT Transfers       STG - Patient to transfer to and from sit to supine (Progressing)       Start:  02/06/25    Expected End:  02/20/25       IND         STG - Patient will transfer sit to and from stand (Progressing)       Start:  02/06/25    Expected End:  02/20/25       Mod IND using FWW with proper body mechanics            Pain - Adult

## 2025-02-07 NOTE — PROGRESS NOTES
Physical Therapy    Physical Therapy Treatment    Patient Name: Bettina Kowalski  MRN: [id number]  Department: [place] [address]  Room: [address] [place] [address]  Today's Date: 2/7/2025  Time Calculation  Start Time: 1400  Stop Time: 1416  Time Calculation (min): 16 min         Assessment/Plan   PT Assessment  End of Session Communication: Bedside nurse  Assessment Comment: Pt session focused on safe stair navigation, review of precautions, and car transfer. Pt reported feeling confident with steps and demo'd understanding of precautions and transfer safety.  End of Session Patient Position: Up in chair, Alarm on  PT Plan  Inpatient/Swing Bed or Outpatient: Inpatient  PT Plan  Treatment/Interventions: Transfer training, Stair training  PT Plan: Ongoing PT  PT Frequency: BID  PT Discharge Recommendations: Moderate intensity level of continued care, Other (comment)  Equipment Recommended upon Discharge:  (Pt owns a FWW)  PT Recommended Transfer Status: Assist x1, Assistive device  PT - OK to Discharge: Yes (Per PT POC.)      General Visit Information:   PT  Visit  PT Received On: 02/07/25  General  Reason for Referral: 64 y.o. F s/p anterior R BARB on 2/6/25.  Referred By: MIGUEL Platt  Prior to Session Communication: Bedside nurse  Patient Position Received: Up in chair, Alarm on  Preferred Learning Style: auditory, kinesthetic, visual  General Comment:  (Pt up in chair upon arrival. agreeable to therapy session.)    Subjective   Precautions:         Vital Signs Comment: Post ambulation trial     Objective   Pain:  Pain Assessment  Pain Assessment: 0-10  0-10 (Numeric) Pain Score: 3  Pain Location: Hip  Pain Orientation: Right  Pain Interventions: Medication (See MAR), Repositioned (RN notified of patients current pain level.)         Treatments:       Transfers  Transfer: Yes  Transfer 1  Transfer From 1: Bed to  Transfer to 1: Stand  Technique 1: Sit to stand  Transfer Device 1: Walker, Gait belt  Transfer Level of Assistance 1: Contact  guard  Trials/Comments 1: x1 Trial  Transfers 2  Technique 2: Stand to sit  Transfer Device 2: Walker, Gait belt  Transfer Level of Assistance 2: Close supervision  Trials/Comments 2: x3 Trials (STS completed from chair w/ arms, toilet trasnfer. Demonstrated safe and efficent transfer techqniue.)    Stairs  Stairs: Yes (Pt completed 2 x5, 6in steps CGA. Trial 1 performed with BHR, CGA. Trial 2 performed R HR CGA. Completed with slow pacing, no instability.)    Outcome Measures:  [facility] Basic Mobility  Turning from your back to your side while in a flat bed without using bedrails: None  Moving from lying on your back to sitting on the side of a flat bed without using bedrails: None  Moving to and from bed to chair (including a wheelchair): A little  Standing up from a chair using your arms (e.g. wheelchair or bedside chair): A little  To walk in hospital room: A little  Climbing 3-5 steps with railing: A little  Basic Mobility - Total Score: 20    Education Documentation  Handouts, taught by Mayra Qureshi PTA at 2/7/2025  2:50 PM.  Learner: Patient  Readiness: Acceptance  Method: Explanation  Response: Verbalizes Understanding    Precautions, taught by Mayra Qureshi PTA at 2/7/2025  2:50 PM.  Learner: Patient  Readiness: Acceptance  Method: Explanation  Response: Verbalizes Understanding    Body Mechanics, taught by Mayra Qureshi PTA at 2/7/2025  2:50 PM.  Learner: Patient  Readiness: Acceptance  Method: Explanation  Response: Verbalizes Understanding    Home Exercise Program, taught by Mayra Qureshi PTA at 2/7/2025  2:50 PM.  Learner: Patient  Readiness: Acceptance  Method: Explanation  Response: Verbalizes Understanding    Mobility Training, taught by Mayra Qureshi PTA at 2/7/2025  2:50 PM.  Learner: Patient  Readiness: Acceptance  Method: Explanation  Response: Verbalizes Understanding    Handouts, taught by Mayra Qureshi PTA at 2/7/2025  2:39 PM.  Learner: Patient  Readiness: Acceptance  Method:  Explanation  Response: Verbalizes Understanding    Precautions, taught by Mayra Qureshi PTA at 2/7/2025  2:39 PM.  Learner: Patient  Readiness: Acceptance  Method: Explanation  Response: Verbalizes Understanding    Body Mechanics, taught by Mayra Qureshi PTA at 2/7/2025  2:39 PM.  Learner: Patient  Readiness: Acceptance  Method: Explanation  Response: Verbalizes Understanding    Home Exercise Program, taught by Mayra Qureshi PTA at 2/7/2025  2:39 PM.  Learner: Patient  Readiness: Acceptance  Method: Explanation  Response: Verbalizes Understanding    Mobility Training, taught by Mayra Qureshi PTA at 2/7/2025  2:39 PM.  Learner: Patient  Readiness: Acceptance  Method: Explanation  Response: Verbalizes Understanding    Education Comments  No comments found.        OP EDUCATION:       Encounter Problems       Encounter Problems (Active)       Balance       Goal 1 (Progressing)       Start:  02/06/25    Expected End:  02/20/25       Pt performs all sitting and standing balance mod IND using FWW            Mobility       STG - Patient will ascend and descend a flight of stairs       Start:  02/06/25    Expected End:  02/20/25       With L HR support, LRAD, and supervision            Mobility       STG - Patient will ambulate (Progressing)       Start:  02/06/25    Expected End:  02/20/25       >100 ft mod IND using proper gait mechanics            PT Problem       PT Goal 1 (Progressing)       Start:  02/06/25    Expected End:  02/20/25       Pt demonstrates IND in performing 2 sets of 12 reps of prescribed LE HEP per anterior BARB protocol            PT Transfers       STG - Patient to transfer to and from sit to supine (Progressing)       Start:  02/06/25    Expected End:  02/20/25       IND         STG - Patient will transfer sit to and from stand (Progressing)       Start:  02/06/25    Expected End:  02/20/25       Mod IND using FWW with proper body mechanics            Pain - Adult

## 2025-02-07 NOTE — NURSING NOTE
Interdisciplinary team present: NP, PT, NM, CC, SW, Orthopedic Coordinator, and bedside RN.  Pain - controlled  Nausea - none  Discharge barrier - needs to improve with PT  Discharge plan - Home with [facility]  Discharge date/time - today or tomorrow

## 2025-02-07 NOTE — PROGRESS NOTES
02/07/25 1321   Discharge Planning   Living Arrangements Alone   Support Systems Family members   Assistance Needed none   Type of Residence Private residence   Number of Stairs to Enter Residence 4   Number of Stairs Within Residence 17   Do you have animals or pets at home? No   Who is requesting discharge planning? Provider   Home or Post Acute Services In home services   Type of Home Care Services Home OT;Home PT   Expected Discharge Disposition Home H   Does the patient need discharge transport arranged? No   Financial Resource Strain   How hard is it for you to pay for the very basics like food, housing, medical care, and heating? Not very   Housing Stability   In the last 12 months, was there a time when you were not able to pay the mortgage or rent on time? N   In the past 12 months, how many times have you moved where you were living? 0   At any time in the past 12 months, were you homeless or living in a shelter (including now)? N   Transportation Needs   In the past 12 months, has lack of transportation kept you from medical appointments or from getting medications? no   In the past 12 months, has lack of transportation kept you from meetings, work, or from getting things needed for daily living? No   Patient Choice   Provider Choice list and CMS website (https://medicare.gov/care-compare#search) for post-acute Quality and Resource Measure Data were provided and reviewed with: Patient   Patient / Family choosing to utilize agency / facility established prior to hospitalization No   Stroke Family Assessment   Stroke Family Assessment Needed No   Intensity of Service   Intensity of Service 0-30 min     2/7/25 1322  Met with patient and son at bedside to discuss discharge planning.  Patient lives at home alone in a house.  She is independent with ADLs and drives at baseline.  She does not use any assistive devices for ambulation but has a walker at home for discharge.  She plans on returning home at  discharge.  He son will be staying with her until he leaves early Sunday morning to return to [place].  Her sister can help out some but works full time.  She is concerned about going home because of the 17 steps to her bedroom.  Waiting on PT to see again today to work on stairs.  PT rec low with [facility] 18.  Patient agreeable to [facility].  Explained [facility] options and expectations.  She would like to use [facility].  SOC confirmed with [facility] for tomorrow.  Brenda Birmingham RN TCC

## 2025-02-07 NOTE — PROGRESS NOTES
S: NAEON.  Feeling well.  Son, Neeraj, in town from [place] and at bedside.  Both appreciative of care.          O  VS:  Temp:  [36.1 °C (97 °F)-36.7 °C (98.1 °F)] 36.6 °C (97.8 °F)  Heart Rate:  [] 78  Resp:  [12-18] 18  BP: (105-152)/(56-83) 108/56    Gen: NAD  Focused exam of operative extremity:  -Dressing with minor stains  -SILT in S/S/SP/DP/T/F distributions  -Able to flex Q, TA, GS  -Palpable DP pulse, symmetric to contralateral  -Thigh high TEDs, SCDs in place    Drain  Na  Micro  Na  PT/OT  Pending  Labs (pertinent)  Hgb 8.7  Imaging  No new imaging          A&P: Bettina Kowalski is a 64 y.o. female s/p R primary BARB on 2/6/2025.  Abx prophylaxis (does not need to stay for postop dose if otherwise ready to DC)  Activity: WBAT.  Analgesia: Multimodal with breakthrough   Consults: Appreciate therapy, SW, case mgmt, IM  Diet: Routine. DC IVF when adequate PO intake  Dressing: Aquacel/Mepilex for 7d. Patient will remove at home then apply daily dry dressing PRN  DVT ppx: Early mobilization, SCDs, pharmacologic (normal risk (81mg aspirin BID 6wks starting POD0))  BLE swelling: Compression hose  DC: Pending PT/OT

## 2025-02-07 NOTE — CARE PLAN
The patient's goals for the shift include      The clinical goals for the shift include Patient will remain hemodynamically stable.      Problem: Fall/Injury  Goal: Not fall by end of shift  Outcome: Progressing     Problem: Fall/Injury  Goal: Be free from injury by end of the shift  Outcome: Progressing     Problem: Pain - Adult  Goal: Verbalizes/displays adequate comfort level or baseline comfort level  Outcome: Progressing

## 2025-02-08 ENCOUNTER — HOME CARE VISIT (OUTPATIENT)
Dept: HOME HEALTH SERVICES | Facility: HOME HEALTH | Age: 65
End: 2025-02-08
Payer: MEDICARE

## 2025-02-08 NOTE — DISCHARGE SUMMARY
Discharge dx: S/p R Primary BARB    Hospital Course: Bettina Kowalski underwent the above procedure on 2/6/2025 followed by transfer to the PACU then floor, where pain was controlled and diet was advanced as tolerated. Bettina progressed with PT/OT and was discharged home in stable condition on 2/7/2025. There were no known hospital issues or complications at time of discharge.    Rx: Based on clinical judgment, I provided an opioid prescription for acute pain following major surgery. Safe use of opioids was discussed.    Instructions: Discharge details, including weight bearing status, dressing instructions, VTE prophylaxis, new/continued/discontinued medications, reasons to call the office or report to the ED, and follow-up plans were provided in the After Visit Summary.    Follow-up: Bettina will call the office with any questions/concerns prior to the first follow-up appt.

## 2025-02-09 ENCOUNTER — HOME CARE VISIT (OUTPATIENT)
Dept: HOME HEALTH SERVICES | Facility: HOME HEALTH | Age: 65
End: 2025-02-09

## 2025-02-10 ENCOUNTER — APPOINTMENT (OUTPATIENT)
Dept: HOME HEALTH SERVICES | Facility: HOME HEALTH | Age: 65
End: 2025-02-10
Payer: MEDICARE

## 2025-02-10 ENCOUNTER — HOME CARE VISIT (OUTPATIENT)
Dept: HOME HEALTH SERVICES | Facility: HOME HEALTH | Age: 65
End: 2025-02-10
Payer: MEDICARE

## 2025-02-10 ENCOUNTER — TELEPHONE (OUTPATIENT)
Dept: ORTHOPEDIC SURGERY | Facility: HOSPITAL | Age: 65
End: 2025-02-10

## 2025-02-10 PROCEDURE — G0151 HHCP-SERV OF PT,EA 15 MIN: HCPCS

## 2025-02-10 SDOH — HEALTH STABILITY: PHYSICAL HEALTH: EXERCISE TYPE: THA PROTOCOL

## 2025-02-10 ASSESSMENT — ACTIVITIES OF DAILY LIVING (ADL)
ENTERING_EXITING_HOME: MINIMUM ASSIST
OASIS_M1830: 04

## 2025-02-10 ASSESSMENT — ENCOUNTER SYMPTOMS
PERSON REPORTING PAIN: PATIENT
MUSCLE WEAKNESS: 1
PAIN LOCATION: RIGHT HIP
PAIN: 1

## 2025-02-10 NOTE — TELEPHONE ENCOUNTER
Spoke with patient during follow-up phone call.  Patient indicates that they are doing well and pain is under control. She has not had a bowel movement since surgery.  She is taking the prescribed medications, miralax and colace. She was encouraged to try a suppository and to call back if she has no results.  Patient denies questions related to discharge instructions or homegoing medications.  Patient is aware of follow up visit with surgeon's office.  Home Care has established a first visit with patient.   Patient denies addtional questions or concerns at this time and verbalizes understanding to call RN Navigator or Surgeon's office with any new or worsening symptoms.

## 2025-02-11 ENCOUNTER — HOME CARE VISIT (OUTPATIENT)
Dept: HOME HEALTH SERVICES | Facility: HOME HEALTH | Age: 65
End: 2025-02-11
Payer: MEDICARE

## 2025-02-11 VITALS — OXYGEN SATURATION: 99 % | HEART RATE: 78 BPM

## 2025-02-11 PROCEDURE — G0152 HHCP-SERV OF OT,EA 15 MIN: HCPCS

## 2025-02-11 SDOH — ECONOMIC STABILITY: HOUSING INSECURITY: HOME SAFETY: ADHESIVE GRAB BAR NOTED TO BE LOOSE

## 2025-02-11 ASSESSMENT — ACTIVITIES OF DAILY LIVING (ADL)
FEEDING ASSESSED: 1
DRESSING_UB_CURRENT_FUNCTION: INDEPENDENT
TOILETING: 1
GROOMING_CURRENT_FUNCTION: INDEPENDENT
BATHING_CURRENT_FUNCTION: SUPERVISION
GROOMING ASSESSED: 1
BATHING ASSESSED: 1
TOILETING: SUPERVISION
PREPARING MEALS: NEEDS ASSISTANCE
DRESSING_LB_CURRENT_FUNCTION: MODERATE ASSIST
FEEDING: INDEPENDENT

## 2025-02-11 ASSESSMENT — ENCOUNTER SYMPTOMS
HIGHEST PAIN SEVERITY IN PAST 24 HOURS: 1/10
LOWEST PAIN SEVERITY IN PAST 24 HOURS: 0/10
PAIN LOCATION: RIGHT LEG
PAIN: 1
PERSON REPORTING PAIN: PATIENT

## 2025-02-13 ENCOUNTER — HOME CARE VISIT (OUTPATIENT)
Dept: HOME HEALTH SERVICES | Facility: HOME HEALTH | Age: 65
End: 2025-02-13
Payer: MEDICARE

## 2025-02-13 PROCEDURE — G0151 HHCP-SERV OF PT,EA 15 MIN: HCPCS

## 2025-02-13 ASSESSMENT — ENCOUNTER SYMPTOMS
PERSON REPORTING PAIN: PATIENT
PAIN: 1
LIMITED RANGE OF MOTION: 1
MUSCLE WEAKNESS: 1
PAIN LOCATION: RIGHT LEG

## 2025-02-17 ENCOUNTER — HOME CARE VISIT (OUTPATIENT)
Dept: HOME HEALTH SERVICES | Facility: HOME HEALTH | Age: 65
End: 2025-02-17
Payer: MEDICARE

## 2025-02-18 ENCOUNTER — HOME CARE VISIT (OUTPATIENT)
Dept: HOME HEALTH SERVICES | Facility: HOME HEALTH | Age: 65
End: 2025-02-18
Payer: MEDICARE

## 2025-02-18 ENCOUNTER — APPOINTMENT (OUTPATIENT)
Dept: RADIOLOGY | Facility: HOSPITAL | Age: 65
End: 2025-02-18
Payer: MEDICARE

## 2025-02-18 PROCEDURE — G0152 HHCP-SERV OF OT,EA 15 MIN: HCPCS

## 2025-02-18 PROCEDURE — G0151 HHCP-SERV OF PT,EA 15 MIN: HCPCS

## 2025-02-18 ASSESSMENT — ENCOUNTER SYMPTOMS
PERSON REPORTING PAIN: PATIENT
PAIN: 1
PAIN: 1
PAIN LOCATION: RIGHT LEG
PERSON REPORTING PAIN: PATIENT

## 2025-02-19 ENCOUNTER — APPOINTMENT (OUTPATIENT)
Dept: HOME HEALTH SERVICES | Facility: HOME HEALTH | Age: 65
End: 2025-02-19
Payer: MEDICARE

## 2025-02-20 ENCOUNTER — HOME CARE VISIT (OUTPATIENT)
Dept: HOME HEALTH SERVICES | Facility: HOME HEALTH | Age: 65
End: 2025-02-20
Payer: MEDICARE

## 2025-02-24 ENCOUNTER — HOME CARE VISIT (OUTPATIENT)
Dept: HOME HEALTH SERVICES | Facility: HOME HEALTH | Age: 65
End: 2025-02-24
Payer: MEDICARE

## 2025-02-24 PROCEDURE — G0151 HHCP-SERV OF PT,EA 15 MIN: HCPCS

## 2025-02-24 ASSESSMENT — ENCOUNTER SYMPTOMS
PERSON REPORTING PAIN: PATIENT
PAIN: 1

## 2025-02-25 ENCOUNTER — APPOINTMENT (OUTPATIENT)
Dept: PRIMARY CARE | Facility: CLINIC | Age: 65
End: 2025-02-25
Payer: COMMERCIAL

## 2025-02-27 ENCOUNTER — HOME CARE VISIT (OUTPATIENT)
Dept: HOME HEALTH SERVICES | Facility: HOME HEALTH | Age: 65
End: 2025-02-27
Payer: MEDICARE

## 2025-02-27 PROCEDURE — G0151 HHCP-SERV OF PT,EA 15 MIN: HCPCS

## 2025-02-27 ASSESSMENT — ENCOUNTER SYMPTOMS: OCCASIONAL FEELINGS OF UNSTEADINESS: 0

## 2025-02-27 ASSESSMENT — ACTIVITIES OF DAILY LIVING (ADL)
OASIS_M1830: 00
HOME_HEALTH_OASIS: 00

## 2025-02-28 ENCOUNTER — OFFICE VISIT (OUTPATIENT)
Dept: ORTHOPEDIC SURGERY | Facility: CLINIC | Age: 65
End: 2025-02-28
Payer: MEDICARE

## 2025-02-28 DIAGNOSIS — Z96.641 HISTORY OF TOTAL HIP ARTHROPLASTY, RIGHT: Primary | ICD-10-CM

## 2025-02-28 PROCEDURE — 99211 OFF/OP EST MAY X REQ PHY/QHP: CPT | Performed by: STUDENT IN AN ORGANIZED HEALTH CARE EDUCATION/TRAINING PROGRAM

## 2025-02-28 PROCEDURE — 1159F MED LIST DOCD IN RCRD: CPT | Performed by: STUDENT IN AN ORGANIZED HEALTH CARE EDUCATION/TRAINING PROGRAM

## 2025-02-28 ASSESSMENT — PAIN SCALES - GENERAL: PAINLEVEL_OUTOF10: 0 - NO PAIN

## 2025-02-28 ASSESSMENT — PAIN - FUNCTIONAL ASSESSMENT: PAIN_FUNCTIONAL_ASSESSMENT: 0-10

## 2025-02-28 NOTE — PROGRESS NOTES
"BARB/TKA Related Summary           L hip: N  L knee  3/30/2024: Primary TKA (Dr. Myles at )  R hip  2/6/2025: Primary BARB (myself at )  R knee: N  Lumbar surgery or significant pathology  11/8/2021: L3-5 lami/fusion, L4-5 TLIF (Dr. Ayala at  [facility]). Doing well.            CC/SUBJECTIVE/HPI            Bettina Kowalski is a 64 y.o. female following up regarding below concerns. Residing at home.    R hip: S/p R Primary BARB on 2/16/2025. No significant interval issues reported with operative site.  Reports outcome as good  Pain mgmt: OTC meds  Assistive device: cane  Able to navigate stairs: Y  Sleeping normally: Y  PT: yes, current (2/wk)  DVT prophylaxis: normal risk (81mg aspirin BID 6wks starting POD0)            HISTORIES            Patient reports no major changes in health, substance use, or baseline medications since last visit.            OBJECTIVE            Physical exam  Estimated body mass index is 29.9 kg/m² as calculated from the following:    Height as of 2/6/25: 1.626 m (5' 4\").    Weight as of 2/6/25: 79 kg (174 lb 2.6 oz).  Gen/psych: NAD, conversational, appropriate    Ambulation  Gait: normal  Assistive device: cane  Spine: Unchanged from previous visit    Focused MSK exam: R  BARB  Trendelenberg test: neg  Skin/incision: CDI, healing appropriately  Tenderness: none  Pain with log roll: neg  ROM: within expected range, nonpainful   Neurovascular    Strength: 5/5 hip/knee/ankle flexion and extension  Sensory (L2-S1): SILT throughout lower extremity  Edema/stasis: no pitting edema  Pulse: DP 1+, PT 1+    Imaging  No new imaging             ASSESSMENT & PLAN           Patient verbalized understanding of below A&P. All questions answered.  L Primary TKA (Dr. Myles, 3/30/2024)  Activity/Therapy/Incision: Continue low impact exercise and weight mgmt.  Follow-up: 5yrs with XR (2029)  R Primary BARB (myself, 2/6/2025)  Incision: Dressing and sutures removed. Incision care discussed. Keep clean, dry. May " shower but no bathing/scrubbing/swimming until completely healed.  Activity/Therapy: Continue exercises and PT. Advance gait aide as appropriate.  Pain: Decrease narcotics; supplement with OTCs, ice.  DVT prophylaxis  BRITNI/ACE: PRN for operative extremity swelling.  Follow-up: 6wks postop with XRs.  PMH, PSH, other considerations discussed  Chiari malformation  Seropositive RA not on antirheumatic medications, follows with rheumatology  DM and elective arthroplasty req A1c <7.5 (5.7 on 10/31/2024)  L3-5 fusion but mobility on exam and sit/stand films did not require DM construct  BMI<40 but on spectrum of increased risk of surgical complications.  SDD candidate but stayed 1 night after R BARB  Listed in EMR but not endorsed by pt or evident on exam: Leg swelling, R lumbar radiculopathy and neurogenic claudication resolved with previous spine surgery, L UKA (was TKA)  Referred by Dr. Myles.  Prior to Bettina's R BARB, she met with my existing patient, Love Augustine, to talk about Love's her experience with BARB  Note: Son (lives in [place]) is  orthopedic OR tech and visited in hospital postop. Sister works at .  Occupation: Retired (mortician)  Hobbies: Travel  PCP: Christopher D'Amico, DO

## 2025-03-03 ENCOUNTER — APPOINTMENT (OUTPATIENT)
Dept: RADIOLOGY | Facility: CLINIC | Age: 65
End: 2025-03-03
Payer: MEDICARE

## 2025-03-03 DIAGNOSIS — Z12.31 SCREENING MAMMOGRAM FOR BREAST CANCER: ICD-10-CM

## 2025-03-12 ENCOUNTER — EVALUATION (OUTPATIENT)
Dept: PHYSICAL THERAPY | Facility: CLINIC | Age: 65
End: 2025-03-12
Payer: MEDICARE

## 2025-03-12 DIAGNOSIS — Z96.641 HISTORY OF TOTAL HIP ARTHROPLASTY, RIGHT: ICD-10-CM

## 2025-03-12 PROCEDURE — 97161 PT EVAL LOW COMPLEX 20 MIN: CPT | Mod: GP | Performed by: PHYSICAL THERAPIST

## 2025-03-12 PROCEDURE — 97110 THERAPEUTIC EXERCISES: CPT | Mod: GP | Performed by: PHYSICAL THERAPIST

## 2025-03-12 ASSESSMENT — ENCOUNTER SYMPTOMS
DEPRESSION: 0
OCCASIONAL FEELINGS OF UNSTEADINESS: 0
LOSS OF SENSATION IN FEET: 1

## 2025-03-12 NOTE — PROGRESS NOTES
Physical Therapy    Physical Therapy Evaluation and Treatment      Patient Name: Bettina Kowalski  MRN: [id number]  Today's Date: 3/12/2025  Visit: 1  Insurance: Reviewed  Physician: Beltran Platt  Problem List Items Addressed This Visit             ICD-10-CM    History of total hip arthroplasty, right Z96.641    Relevant Orders    Follow Up In Physical Therapy        Time Entry:   Time Calculation  Start Time: 1030  Stop Time: 1110  Time Calculation (min): 40 min  PT Evaluation Time Entry  PT Evaluation (Low) Time Entry: 30  PT Therapeutic Procedures Time Entry  Therapeutic Exercise Time Entry: 10      Assessment: Patient seen in PT for Initial Evaluation for post op BARB.   Patient presents with postural deviation  decreased hip ROM , decreased hip strength, hip tenderness, gait deviation, decreased balance.  Functionally, patient  unable to take long walks  only doing light ADL's. .  Patient rates LEFS  at 48.75%.    PT Assessment  Rehab Prognosis: Good  Evaluation/Treatment Tolerance: Patient tolerated treatment well     Plan:  Continue with POC  BARB protocol  OP PT Plan  PT Plan: Skilled PT  PT Frequency: 1 time per week  Duration: 8  Onset Date: 02/09/25  Certification Period Start Date: 03/12/25  Certification Period End Date: 06/10/25  Rehab Potential: Good  Plan of Care Agreement: Patient    Current Problem:   1. History of total hip arthroplasty, right  Referral to Physical Therapy    Follow Up In Physical Therapy          Subjective    General: Patient with a history of right hip pain for a year+ and worse since October for which patient had BARB (anterior approach) on 2/six/25.  No complications.   Patient treated with home PT and .  Patient complains of pain in the region of the right groin, lateral hip, sharp pain, 2/10 at worst last 7 days.  Patient's pain is worse with sit to stand, turning too fast, walk or stand too long.  Functionally, patient is able to do only light ADL's, no long walks.  Retired.      Precautions: HTN, DM, asthma  Precautions  STEADI Fall Risk Score (The score of 4 or more indicates an increased risk of falling): 2    Prior Level of Function: limited walking due to pain        Objective     Posture: patient keeps right knee and hip slightly flexed, leans weight to left leg   LE ROM: right hip ROM flexion 105, ext 0, abd 35  LE strength: 4/5 hip abd, 4+/5 hip add, 4/5 hip flex and ext, 4/5 knee flex and ext  Gait: holds right leg stiff in stance with decreased heel strike and toe off on right   Balance: less then 1 sec SLS on right, 4 sec on left  Well healing incision     Outcome Measures:  Other Measures  Lower Extremity Funtional Score (LEFS): 39     Treatments:  Patient instructed in HEP including: hip add squeezes, hip abd/er with green theraband, bridges, leg lift, 5-20X (10 minutes)    EDUCATION: HEP       Goals:  Active       PT Problem       PT Goal 1       Start:  03/12/25    Expected End:  06/10/25       Hip pain to 1/10 or less         PT Goal 2       Start:  03/12/25    Expected End:  06/10/25       Improve LEFS by 20%         PT Goal 3       Start:  03/12/25    Expected End:  06/10/25       Normal gait without device         PT Goal 4       Start:  03/12/25    Expected End:  06/10/25       5/5 hip and knee strength            PT Goal 5       Start:  03/12/25    Expected End:  06/10/25       Maximize hip ROM

## 2025-03-17 ENCOUNTER — TREATMENT (OUTPATIENT)
Dept: PHYSICAL THERAPY | Facility: CLINIC | Age: 65
End: 2025-03-17
Payer: MEDICARE

## 2025-03-17 DIAGNOSIS — Z96.641 HISTORY OF TOTAL HIP ARTHROPLASTY, RIGHT: Primary | ICD-10-CM

## 2025-03-17 PROCEDURE — 97110 THERAPEUTIC EXERCISES: CPT | Mod: GP | Performed by: PHYSICAL THERAPIST

## 2025-03-17 NOTE — PROGRESS NOTES
Physical Therapy    Physical Therapy Treatment    Patient Name: Bettina Kowalski  MRN: [id number]  Today's Date: 3/17/2025  Visit: 2/8  Insurance: [facility]   Authorization: 3/12/25-6/10/25    Time Entry:   Time Calculation  Start Time: 1030  Stop Time: 1110  Time Calculation (min): 40 min     PT Therapeutic Procedures Time Entry  Therapeutic Exercise Time Entry: 40    Assessment: Good understanding and compliance to HEP.       Plan:  Continue to HEP        Current Problem  1. History of total hip arthroplasty, right            General      Going without cane in house - taking cane   Right hip pain 1/10 at worst last 2 days   Still doing steps one at a time  Was able to go into store      Subjective    Precautions: HTN, DM, asthma        Pain       Objective     Independent with HEP     Treatments:  SciFit stepper 7 minutes   Step stretch 20X  Standing hip flexion, abduction 15X  Standing heel raises and squats 15X  Reviewed heel to toe walking with cane 20' X 2  Reviewed HEP  Sidelying clamshells 20X   Sidelying hip abd 10X  (40 minutes)      OP EDUCATION: HEP       Goals:  Active       PT Problem       PT Goal 1       Start:  03/12/25    Expected End:  06/10/25       Hip pain to 1/10 or less         PT Goal 2       Start:  03/12/25    Expected End:  06/10/25       Improve LEFS by 20%         PT Goal 3       Start:  03/12/25    Expected End:  06/10/25       Normal gait without device         PT Goal 4       Start:  03/12/25    Expected End:  06/10/25       5/5 hip and knee strength            PT Goal 5       Start:  03/12/25    Expected End:  06/10/25       Maximize hip ROM

## 2025-03-24 ENCOUNTER — APPOINTMENT (OUTPATIENT)
Dept: PHYSICAL THERAPY | Facility: CLINIC | Age: 65
End: 2025-03-24
Payer: MEDICARE

## 2025-03-26 ENCOUNTER — APPOINTMENT (OUTPATIENT)
Dept: PHYSICAL THERAPY | Facility: CLINIC | Age: 65
End: 2025-03-26
Payer: MEDICARE

## 2025-04-01 ENCOUNTER — APPOINTMENT (OUTPATIENT)
Dept: PHYSICAL THERAPY | Facility: CLINIC | Age: 65
End: 2025-04-01
Payer: MEDICARE

## 2025-04-03 ENCOUNTER — TREATMENT (OUTPATIENT)
Dept: PHYSICAL THERAPY | Facility: CLINIC | Age: 65
End: 2025-04-03
Payer: MEDICARE

## 2025-04-03 DIAGNOSIS — Z96.641 HISTORY OF TOTAL HIP ARTHROPLASTY, RIGHT: ICD-10-CM

## 2025-04-03 PROCEDURE — 97110 THERAPEUTIC EXERCISES: CPT | Mod: GP | Performed by: PHYSICAL THERAPIST

## 2025-04-03 NOTE — PROGRESS NOTES
"Physical Therapy    Physical Therapy Treatment    Patient Name: Bettina Kowalski  MRN: [id number]  Today's Date: 4/3/2025  Visit: 3/8  Insurance: [facility]   Authorization: 3/12/25-6/10/25    Time Entry:   Time Calculation  Start Time: 0100  Stop Time: 0140  Time Calculation (min): 40 min     PT Therapeutic Procedures Time Entry  Therapeutic Exercise Time Entry: 40    Assessment: Patient with increased pain since reducing her use of cane and doing more ADL's.       Plan:  Continue with HEP        Current Problem  1. History of total hip arthroplasty, right  Follow Up In Physical Therapy          General     Right hip pain 3.5/10 at worst last 2 days   Not extending leg behind her per doctors report   Seeing doctor 4/11    Subjective    Precautions: HTN, DM, asthma        Pain       Objective         Treatments:  SciFit stepper 10 minutes   Step stretch 20X  Standing hip flex and abd with 2# ankle weights   Step ups 4\" 20X   Standing heel raises and squats 15X  Reviewed heel to toe walking in parallel bars 5X B+F  Side to side walking in parallel bars 5X B+F  SLS on foam 10X each   Bridges 20X  Heel slides on red theraball 20X  Hip abd/add on red theraball 20X   Sidelying clamshells 20X   (40 minutes)      OP EDUCATION: HEP       Goals:  Active       PT Problem       PT Goal 1       Start:  03/12/25    Expected End:  06/10/25       Hip pain to 1/10 or less         PT Goal 2       Start:  03/12/25    Expected End:  06/10/25       Improve LEFS by 20%         PT Goal 3       Start:  03/12/25    Expected End:  06/10/25       Normal gait without device         PT Goal 4       Start:  03/12/25    Expected End:  06/10/25       5/5 hip and knee strength            PT Goal 5       Start:  03/12/25    Expected End:  06/10/25       Maximize hip ROM                        "

## 2025-04-07 ENCOUNTER — HOSPITAL ENCOUNTER (OUTPATIENT)
Dept: RADIOLOGY | Facility: CLINIC | Age: 65
Discharge: HOME | End: 2025-04-07
Payer: MEDICARE

## 2025-04-07 DIAGNOSIS — Z12.31 SCREENING MAMMOGRAM FOR BREAST CANCER: ICD-10-CM

## 2025-04-07 PROCEDURE — 77063 BREAST TOMOSYNTHESIS BI: CPT | Performed by: RADIOLOGY

## 2025-04-07 PROCEDURE — 77067 SCR MAMMO BI INCL CAD: CPT | Performed by: RADIOLOGY

## 2025-04-07 PROCEDURE — 77067 SCR MAMMO BI INCL CAD: CPT

## 2025-04-08 ENCOUNTER — TREATMENT (OUTPATIENT)
Dept: PHYSICAL THERAPY | Facility: CLINIC | Age: 65
End: 2025-04-08
Payer: MEDICARE

## 2025-04-08 DIAGNOSIS — Z96.641 HISTORY OF TOTAL HIP ARTHROPLASTY, RIGHT: ICD-10-CM

## 2025-04-08 PROCEDURE — 97110 THERAPEUTIC EXERCISES: CPT | Mod: GP,CQ

## 2025-04-08 ASSESSMENT — PAIN SCALES - GENERAL: PAINLEVEL_OUTOF10: 4

## 2025-04-08 ASSESSMENT — PAIN - FUNCTIONAL ASSESSMENT: PAIN_FUNCTIONAL_ASSESSMENT: 0-10

## 2025-04-08 NOTE — PROGRESS NOTES
"Physical Therapy    Physical Therapy Treatment    Patient Name: Bettina Kowalski  MRN: [id number]  Today's Date: 4/8/2025  Visit: 4/8  Insurance: [facility]   Authorization: 3/12/25-6/10/25  Time Entry:   Time Calculation  Start Time: 0100  Stop Time: 0145  Time Calculation (min): 45 min     PT Therapeutic Procedures Time Entry  Therapeutic Exercise Time Entry: 45    Assessment:   PT Assessment  Evaluation/Treatment Tolerance: Patient tolerated treatment well  Repeated and new therex completed without painful increase; fatigues, rest breaks taken when needed. Cueing given for clams to maintain vertical pelvis and methods of use for HEP. Quad strengthening added. Patient looking into purchasing ankle weights for better HEP progression.     Plan:  Continue with HEP      Current Problem  1. History of total hip arthroplasty, right  Follow Up In Physical Therapy      General   Right hip pain 3.5/10 at worst last 2 days   Not extending leg behind her per doctors report   Seeing doctor 4/11    Subjective   My hip is achy today, the weather is cold and not helping  Current pain level given is a 4/10  HEP completed daily    Precautions: HTN, DM, asthma        Pain  Pain Assessment  Pain Assessment: 0-10  0-10 (Numeric) Pain Score: 4    Objective     Treatments:  SciFit stepper x 8 minutes   Step stretch 20X  Step ups 6\" 20X   Lateral step ups 6\" 20X  Standing hip flex and abd with 2# ankle weights x 20  Standing heel raises and squats 15X  Side to side walking in parallel bars 5X B+F RTB  Bridges 20X  Sidelying clamshells 20X   Seated EOB LAQ x 20  SLS on foam 10X each    (40 minutes)    OP EDUCATION: HEP       Goals:  Active       PT Problem       PT Goal 1       Start:  03/12/25    Expected End:  06/10/25       Hip pain to 1/10 or less         PT Goal 2       Start:  03/12/25    Expected End:  06/10/25       Improve LEFS by 20%         PT Goal 3       Start:  03/12/25    Expected End:  06/10/25       Normal gait without device    "      PT Goal 4       Start:  03/12/25    Expected End:  06/10/25       5/5 hip and knee strength            PT Goal 5       Start:  03/12/25    Expected End:  06/10/25       Maximize hip ROM

## 2025-04-10 ENCOUNTER — APPOINTMENT (OUTPATIENT)
Dept: PHYSICAL THERAPY | Facility: CLINIC | Age: 65
End: 2025-04-10
Payer: MEDICARE

## 2025-04-11 ENCOUNTER — HOSPITAL ENCOUNTER (OUTPATIENT)
Dept: RADIOLOGY | Facility: CLINIC | Age: 65
Discharge: HOME | End: 2025-04-11
Payer: MEDICARE

## 2025-04-11 ENCOUNTER — OFFICE VISIT (OUTPATIENT)
Dept: ORTHOPEDIC SURGERY | Facility: CLINIC | Age: 65
End: 2025-04-11
Payer: MEDICARE

## 2025-04-11 DIAGNOSIS — Z98.890 HISTORY OF ARTHROPLASTY: Primary | ICD-10-CM

## 2025-04-11 DIAGNOSIS — Z96.641 HISTORY OF TOTAL HIP ARTHROPLASTY, RIGHT: ICD-10-CM

## 2025-04-11 DIAGNOSIS — Z96.641 HISTORY OF TOTAL HIP ARTHROPLASTY, RIGHT: Primary | ICD-10-CM

## 2025-04-11 PROCEDURE — 1159F MED LIST DOCD IN RCRD: CPT | Performed by: STUDENT IN AN ORGANIZED HEALTH CARE EDUCATION/TRAINING PROGRAM

## 2025-04-11 PROCEDURE — 73502 X-RAY EXAM HIP UNI 2-3 VIEWS: CPT | Mod: RT

## 2025-04-11 PROCEDURE — 99211 OFF/OP EST MAY X REQ PHY/QHP: CPT | Performed by: STUDENT IN AN ORGANIZED HEALTH CARE EDUCATION/TRAINING PROGRAM

## 2025-04-11 RX ORDER — CYCLOBENZAPRINE HCL 5 MG
5 TABLET ORAL 3 TIMES DAILY
Qty: 21 TABLET | Refills: 0 | Status: SHIPPED | OUTPATIENT
Start: 2025-04-11 | End: 2025-04-18

## 2025-04-11 RX ORDER — AMOXICILLIN 500 MG/1
2000 CAPSULE ORAL ONCE
Qty: 4 CAPSULE | Refills: 0 | Status: SHIPPED | OUTPATIENT
Start: 2025-04-11 | End: 2025-04-11

## 2025-04-11 ASSESSMENT — PAIN - FUNCTIONAL ASSESSMENT: PAIN_FUNCTIONAL_ASSESSMENT: 0-10

## 2025-04-11 ASSESSMENT — PAIN SCALES - GENERAL: PAINLEVEL_OUTOF10: 0 - NO PAIN

## 2025-04-11 NOTE — PROGRESS NOTES
"BARB/TKA Related Summary           L hip: N  L knee  3/30/2024: Primary TKA (Dr. Myles at )  R hip  2/6/2025: Primary BARB (myself at )  R knee: N  Lumbar surgery or significant pathology  11/8/2021: L3-5 lami/fusion, L4-5 TLIF (Dr. Ayala at  [facility]). Doing well.            CC/SUBJECTIVE/HPI            Bettina Kowalski is a 64 y.o. female following up regarding:    R hip: S/p R Primary BARB   Reports outcome as good  Pain mgmt: OTC meds  Assistive device: cane  Able to navigate stairs: Y  Sleeping normally: Y  PT: yes, current (2/wk)            HISTORIES            Reports no major changes in health, substance use, or baseline medications.            OBJECTIVE            Physical exam  Estimated body mass index is 29.9 kg/m² as calculated from the following:    Height as of 2/6/25: 1.626 m (5' 4\").    Weight as of 2/6/25: 79 kg (174 lb 2.6 oz).  Gen/psych: NAD, conversational, appropriate    Ambulation  Gait: normal  Assistive device: cane  Spine: Unchanged from previous visit    Focused MSK exam: R  BARB  Trendelenberg test: neg  Skin/incision: well healed  Tenderness: none  Pain with log roll: neg  ROM: within expected range, nonpainful  Neurovascular    Strength: 5/5 hip/knee/ankle flexion and extension  Sensory (L2-S1): SILT throughout lower extremity  Edema/stasis: no pitting edema  Pulse: DP 1+, PT 1+      Imaging  4/11/2025 R hip radiographs (AP Pelvis, AP/Lateral) on my read: BARB components in acceptable position with no sign of gross implant/hardware failure.             ASSESSMENT & PLAN           L Primary TKA (Dr. Myles, 3/30/2024)  Doing well  Follow-up: 5yrs with XR (2029)  R Primary BARB (myself, 2/6/2025)  Activity/Therapy/Incision: Continue low impact exercise and weight mgmt. May swim/bathe now that incision healed.  Pain: OTCs, ice as needed  Follow-up: 3mo postop  PMH, PSH, other considerations discussed  Chiari malformation  Seropositive RA not on antirheumatic medications, follows with " rheumatology  DM and elective arthroplasty req A1c <7.5 (5.7 on 10/31/2024)  L3-5 fusion but mobility on exam and sit/stand films did not require DM construct  BMI<40 but on spectrum of increased risk of surgical complications.  SDD candidate but stayed 1 night after R BARB  Listed in EMR but not endorsed by pt or evident on exam: Leg swelling, R lumbar radiculopathy and neurogenic claudication resolved with previous spine surgery, L UKA (was TKA)  Referred by Dr. Myles.  Prior to Bettina's R BARB, she met with my existing patient, Love Augustine, to talk about Love's her experience with BARB  Note: Son (lives in [place]) is  orthopedic OR tech and visited in hospital postop. Sister works at .  Occupation: Retired (mortician)  Hobbies: Travel  PCP: Christopher D'Amico, DO

## 2025-04-21 ENCOUNTER — TREATMENT (OUTPATIENT)
Dept: PHYSICAL THERAPY | Facility: CLINIC | Age: 65
End: 2025-04-21
Payer: MEDICARE

## 2025-04-21 DIAGNOSIS — Z96.641 HISTORY OF TOTAL HIP ARTHROPLASTY, RIGHT: ICD-10-CM

## 2025-04-21 PROCEDURE — 97110 THERAPEUTIC EXERCISES: CPT | Mod: GP,CQ

## 2025-04-21 ASSESSMENT — PAIN SCALES - GENERAL: PAINLEVEL_OUTOF10: 1

## 2025-04-21 ASSESSMENT — PAIN - FUNCTIONAL ASSESSMENT: PAIN_FUNCTIONAL_ASSESSMENT: 0-10

## 2025-04-21 NOTE — PROGRESS NOTES
"Physical Therapy    Physical Therapy Treatment    Patient Name: Bettina Kowalski  MRN: [id number]  Today's Date: 4/21/2025  Visit: 5/8  Insurance: [facility]   Authorization: 3/12/25-6/10/25  Time Entry:   Time Calculation  Start Time: 0340  Stop Time: 0425  Time Calculation (min): 45 min     PT Therapeutic Procedures Time Entry  Therapeutic Exercise Time Entry: 45    Assessment:   PT Assessment  Evaluation/Treatment Tolerance: Patient tolerated treatment well  Progressed quad and hip flexor strengthening today; slight assistance required with SLR and gradual increase of quad lag. Patient shows good understanding of therex. Completes today's session without painful increase.     Plan:  Continue with HEP      Current Problem  1. History of total hip arthroplasty, right  Follow Up In Physical Therapy      General   Right hip pain 3.5/10 at worst last 2 days   Not extending leg behind her per doctors report   Seeing doctor 4/11    Subjective   1/10 right hip pain and sore, the rain is not helping  Using advil cream as needed  Performing around four of my exercises daily along with ascending/descending a lot of stairs  No other noted concerns/questions    Precautions: HTN, DM, asthma        Pain  Pain Assessment  Pain Assessment: 0-10  0-10 (Numeric) Pain Score: 1    Objective     Treatments:  SciFit stepper x 8 minutes   Step stretch 20X  Lateral step ups 6\" 20X  Ascending/descending 6\" steps x 5  Mini squats x 20  Side to side walking in parallel bars 5X B+F RTB  Standing Rtb stationary march x 20  Standing hip abduction RTB x 20  SLR with slight hand assistance x 20  Seated EOB LAQ x 20 2.5# (3 sec pause, slow lower)  Bridges with hip adduction 15X  Sidelying clamshells 20X     (40 minutes)    OP EDUCATION: HEP       Goals:  Active       PT Problem       PT Goal 1       Start:  03/12/25    Expected End:  06/10/25       Hip pain to 1/10 or less         PT Goal 2       Start:  03/12/25    Expected End:  06/10/25       Improve " LEFS by 20%         PT Goal 3       Start:  03/12/25    Expected End:  06/10/25       Normal gait without device         PT Goal 4       Start:  03/12/25    Expected End:  06/10/25       5/5 hip and knee strength            PT Goal 5       Start:  03/12/25    Expected End:  06/10/25       Maximize hip ROM

## 2025-04-28 ENCOUNTER — TREATMENT (OUTPATIENT)
Dept: PHYSICAL THERAPY | Facility: CLINIC | Age: 65
End: 2025-04-28
Payer: MEDICARE

## 2025-04-28 DIAGNOSIS — Z96.641 HISTORY OF TOTAL HIP ARTHROPLASTY, RIGHT: ICD-10-CM

## 2025-04-28 PROCEDURE — 97110 THERAPEUTIC EXERCISES: CPT | Mod: GP,CQ

## 2025-04-28 ASSESSMENT — PAIN - FUNCTIONAL ASSESSMENT: PAIN_FUNCTIONAL_ASSESSMENT: 0-10

## 2025-04-28 ASSESSMENT — PAIN SCALES - GENERAL: PAINLEVEL_OUTOF10: 1

## 2025-04-28 NOTE — PROGRESS NOTES
"Physical Therapy    Physical Therapy Treatment    Patient Name: Bettina Kowalski  MRN: [id number]  Today's Date: 4/28/2025  Visit: 6/8  Insurance: [facility]   Authorization: 3/12/25-6/10/25  Time Entry:   Time Calculation  Start Time: 0230  Stop Time: 0315  Time Calculation (min): 45 min     PT Therapeutic Procedures Time Entry  Therapeutic Exercise Time Entry: 45    Assessment:   PT Assessment  Evaluation/Treatment Tolerance: Patient tolerated treatment well  Progressing quad and hip flexor strengthening; continues to have discomfort during SLR. Lateral walking and monster walking increased patient fatigue, but completed well without pain increase. Reassess next visit with evaluating therapist.     Plan:  Continue with HEP      Current Problem  1. History of total hip arthroplasty, right  Follow Up In Physical Therapy      General   Right hip pain 3.5/10 at worst last 2 days   Not extending leg behind her per doctors report   Seeing doctor 4/11    Subjective   Pain of 1/10 currently  Completes parts of her HEP daily  I feel better after doing my exercises  No further updates/concerns    Precautions: HTN, DM, asthma        Pain  Pain Assessment  Pain Assessment: 0-10  0-10 (Numeric) Pain Score: 1  Objective     Treatments:  SciFit stepper x 8 minutes   Step stretch 20X  Leg extensions 10# x 20  Standing 2.5# aw marches x 20  SLR with strap assistance x 20  Lateral walking white tape x 3 GTB  Monster walking white tape x 2 GTB  Sit to stand from chair x 10  Ascending/descending 6\" steps x 5  Sidelying clamshells 20X    (40 minutes)    OP EDUCATION: HEP     Goals:  Active       PT Problem       PT Goal 1       Start:  03/12/25    Expected End:  06/10/25       Hip pain to 1/10 or less         PT Goal 2       Start:  03/12/25    Expected End:  06/10/25       Improve LEFS by 20%         PT Goal 3       Start:  03/12/25    Expected End:  06/10/25       Normal gait without device         PT Goal 4       Start:  03/12/25    " Expected End:  06/10/25       5/5 hip and knee strength            PT Goal 5       Start:  03/12/25    Expected End:  06/10/25       Maximize hip ROM

## 2025-05-05 ENCOUNTER — HOSPITAL ENCOUNTER (OUTPATIENT)
Dept: RADIOLOGY | Facility: CLINIC | Age: 65
Discharge: HOME | End: 2025-05-05
Payer: MEDICARE

## 2025-05-05 ENCOUNTER — OFFICE VISIT (OUTPATIENT)
Dept: URGENT CARE | Age: 65
End: 2025-05-05
Payer: MEDICARE

## 2025-05-05 ENCOUNTER — APPOINTMENT (OUTPATIENT)
Dept: PHYSICAL THERAPY | Facility: CLINIC | Age: 65
End: 2025-05-05
Payer: MEDICARE

## 2025-05-05 VITALS
HEART RATE: 92 BPM | SYSTOLIC BLOOD PRESSURE: 150 MMHG | DIASTOLIC BLOOD PRESSURE: 83 MMHG | RESPIRATION RATE: 18 BRPM | WEIGHT: 185 LBS | BODY MASS INDEX: 31.76 KG/M2 | OXYGEN SATURATION: 95 % | TEMPERATURE: 97.9 F

## 2025-05-05 DIAGNOSIS — M21.932: Primary | ICD-10-CM

## 2025-05-05 DIAGNOSIS — M21.932: ICD-10-CM

## 2025-05-05 PROCEDURE — 73110 X-RAY EXAM OF WRIST: CPT | Mod: LT

## 2025-05-05 PROCEDURE — 73110 X-RAY EXAM OF WRIST: CPT | Mod: LEFT SIDE | Performed by: RADIOLOGY

## 2025-05-05 RX ORDER — IBUPROFEN 600 MG/1
600 TABLET ORAL ONCE
Status: COMPLETED | OUTPATIENT
Start: 2025-05-05 | End: 2025-05-05

## 2025-05-05 RX ADMIN — IBUPROFEN 600 MG: 600 TABLET ORAL at 16:30

## 2025-05-05 ASSESSMENT — PATIENT HEALTH QUESTIONNAIRE - PHQ9
2. FEELING DOWN, DEPRESSED OR HOPELESS: NOT AT ALL
SUM OF ALL RESPONSES TO PHQ9 QUESTIONS 1 AND 2: 0
1. LITTLE INTEREST OR PLEASURE IN DOING THINGS: NOT AT ALL

## 2025-05-05 ASSESSMENT — PAIN SCALES - GENERAL: PAINLEVEL_OUTOF10: 6
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
## 2025-05-09 ASSESSMENT — ENCOUNTER SYMPTOMS
COUGH: 0
HEADACHES: 0
FEVER: 0
CHILLS: 0
SHORTNESS OF BREATH: 0
FATIGUE: 0

## 2025-05-20 ENCOUNTER — APPOINTMENT (OUTPATIENT)
Dept: OPHTHALMOLOGY | Facility: CLINIC | Age: 65
End: 2025-05-20
Payer: COMMERCIAL

## 2025-05-20 DIAGNOSIS — H25.13 AGE-RELATED NUCLEAR CATARACT OF BOTH EYES: ICD-10-CM

## 2025-05-20 DIAGNOSIS — H52.4 HYPEROPIA WITH PRESBYOPIA OF BOTH EYES: Primary | ICD-10-CM

## 2025-05-20 DIAGNOSIS — H52.03 HYPEROPIA WITH PRESBYOPIA OF BOTH EYES: Primary | ICD-10-CM

## 2025-05-20 PROBLEM — H25.10 NUCLEAR SENILE CATARACT: Status: RESOLVED | Noted: 2020-12-23 | Resolved: 2025-05-20

## 2025-05-20 PROBLEM — H53.9 CHANGE IN VISION: Status: RESOLVED | Noted: 2023-05-24 | Resolved: 2025-05-20

## 2025-05-20 PROBLEM — H52.00 HYPEROPIA: Status: RESOLVED | Noted: 2023-05-24 | Resolved: 2025-05-20

## 2025-05-20 PROCEDURE — 92015 DETERMINE REFRACTIVE STATE: CPT | Performed by: STUDENT IN AN ORGANIZED HEALTH CARE EDUCATION/TRAINING PROGRAM

## 2025-05-20 PROCEDURE — 92004 COMPRE OPH EXAM NEW PT 1/>: CPT | Performed by: STUDENT IN AN ORGANIZED HEALTH CARE EDUCATION/TRAINING PROGRAM

## 2025-05-20 ASSESSMENT — REFRACTION_MANIFEST
OS_ADD: +2.50
METHOD_AUTOREFRACTION: 1
OD_AXIS: 170
OS_CYLINDER: +0.75
OD_SPHERE: +2.00
OS_SPHERE: +1.00
OD_CYLINDER: +0.50
OD_SPHERE: +1.50
OD_CYLINDER: +0.75
OS_AXIS: 002
OS_SPHERE: +1.50
OD_ADD: +2.50
OD_AXIS: 165
OS_AXIS: 011
OS_CYLINDER: +0.75

## 2025-05-20 ASSESSMENT — ENCOUNTER SYMPTOMS
CONSTITUTIONAL NEGATIVE: 0
NEUROLOGICAL NEGATIVE: 0
ENDOCRINE NEGATIVE: 0
ALLERGIC/IMMUNOLOGIC NEGATIVE: 0
PSYCHIATRIC NEGATIVE: 0
HEMATOLOGIC/LYMPHATIC NEGATIVE: 0
RESPIRATORY NEGATIVE: 0
GASTROINTESTINAL NEGATIVE: 0
MUSCULOSKELETAL NEGATIVE: 0
EYES NEGATIVE: 0
CARDIOVASCULAR NEGATIVE: 0

## 2025-05-20 ASSESSMENT — SLIT LAMP EXAM - LIDS
COMMENTS: GOOD POSITION, MILD MGD
COMMENTS: GOOD POSITION, MILD MGD

## 2025-05-20 ASSESSMENT — CONF VISUAL FIELD
OS_INFERIOR_NASAL_RESTRICTION: 0
OD_SUPERIOR_NASAL_RESTRICTION: 0
OS_SUPERIOR_TEMPORAL_RESTRICTION: 0
OS_NORMAL: 1
OS_INFERIOR_TEMPORAL_RESTRICTION: 0
OD_NORMAL: 1
OS_SUPERIOR_NASAL_RESTRICTION: 0
OD_INFERIOR_NASAL_RESTRICTION: 0
OD_SUPERIOR_TEMPORAL_RESTRICTION: 0
OD_INFERIOR_TEMPORAL_RESTRICTION: 0
METHOD: COUNTING FINGERS

## 2025-05-20 ASSESSMENT — VISUAL ACUITY
OS_CC: 20/20
OD_CC: 20/20
METHOD: SNELLEN - LINEAR
CORRECTION_TYPE: GLASSES

## 2025-05-20 ASSESSMENT — REFRACTION_WEARINGRX
OS_SPHERE: +1.00
OS_AXIS: 177
OD_AXIS: 167
OD_SPHERE: +1.25
OS_CYLINDER: +0.50
OD_CYLINDER: +0.75
OS_ADD: +2.50
OD_ADD: +2.50

## 2025-05-20 ASSESSMENT — TONOMETRY
IOP_METHOD: GOLDMANN APPLANATION
OS_IOP_MMHG: 16
OD_IOP_MMHG: 16

## 2025-05-20 ASSESSMENT — CUP TO DISC RATIO
OD_RATIO: .3
OS_RATIO: .3

## 2025-05-20 ASSESSMENT — EXTERNAL EXAM - LEFT EYE: OS_EXAM: NORMAL

## 2025-05-20 ASSESSMENT — EXTERNAL EXAM - RIGHT EYE: OD_EXAM: NORMAL

## 2025-05-20 NOTE — PROGRESS NOTES
Assessment/Plan   Diagnoses and all orders for this visit:  Hyperopia with presbyopia of both eyes  -New spec rx released today per patient request. Ocular health wnl for age OU. Monitor 1 year or sooner prn. Refraction billed today.  -mild changes compared to previous RX  -BCVA 20/20 OD+OS  Age-related nuclear cataract of both eyes  -mild non visually significant; pt ed; monitor at this time    RTC 1 year for annual with DFE and MRX    DVT ppx: lovenox subq daily  Diet: regular  Pain control: toradol 30 IVP q8h prn mod pain, morphine 4mg IVPq6h prn severe pain  Constipation: miralax, senna DVT ppx: lovenox subq daily  Diet: regular  Pain control: toradol 30 IVP q8h prn mod pain, morphine 4mg IVPq6h prn severe pain  Constipation: miralax, senna. Will consider enema if pt does not have bowel movement

## 2025-05-27 ASSESSMENT — ENCOUNTER SYMPTOMS
ABDOMINAL PAIN: 0
AURA: 0
HEADACHES: 0
MEMORY LOSS: 0
LOSS OF BALANCE: 0
FATIGUE: 0
PALPITATIONS: 0
NECK PAIN: 0
DIZZINESS: 0
VISUAL CHANGE: 1
CLUMSINESS: 0
FOCAL SENSORY LOSS: 1
LIGHT-HEADEDNESS: 0
FEVER: 0
ALTERED MENTAL STATUS: 0
DIAPHORESIS: 0
SLURRED SPEECH: 0
BOWEL INCONTINENCE: 0
NEUROLOGIC COMPLAINT: 1
SHORTNESS OF BREATH: 1
NEAR-SYNCOPE: 0
VERTIGO: 0
FOCAL WEAKNESS: 1
BACK PAIN: 0
WEAKNESS: 0
NAUSEA: 0
CONFUSION: 0

## 2025-05-28 ENCOUNTER — APPOINTMENT (OUTPATIENT)
Dept: PRIMARY CARE | Facility: CLINIC | Age: 65
End: 2025-05-28
Payer: COMMERCIAL

## 2025-05-28 VITALS
HEART RATE: 68 BPM | WEIGHT: 203 LBS | SYSTOLIC BLOOD PRESSURE: 140 MMHG | BODY MASS INDEX: 34.66 KG/M2 | DIASTOLIC BLOOD PRESSURE: 84 MMHG | OXYGEN SATURATION: 97 % | HEIGHT: 64 IN

## 2025-05-28 DIAGNOSIS — R35.0 INCREASED URINARY FREQUENCY: ICD-10-CM

## 2025-05-28 DIAGNOSIS — M05.9 SEROPOSITIVE RHEUMATOID ARTHRITIS: ICD-10-CM

## 2025-05-28 DIAGNOSIS — I10 BENIGN HYPERTENSION: Primary | ICD-10-CM

## 2025-05-28 DIAGNOSIS — M54.42 CHRONIC BILATERAL LOW BACK PAIN WITH SCIATICA, SCIATICA LATERALITY UNSPECIFIED: ICD-10-CM

## 2025-05-28 DIAGNOSIS — E04.2 MULTINODULAR GOITER: ICD-10-CM

## 2025-05-28 DIAGNOSIS — Q07.00 ARNOLD-CHIARI SYNDROME WITHOUT SPINA BIFIDA OR HYDROCEPHALUS (MULTI): ICD-10-CM

## 2025-05-28 DIAGNOSIS — G89.29 CHRONIC BILATERAL LOW BACK PAIN WITH SCIATICA, SCIATICA LATERALITY UNSPECIFIED: ICD-10-CM

## 2025-05-28 DIAGNOSIS — J45.909 ASTHMA, UNSPECIFIED ASTHMA SEVERITY, UNSPECIFIED WHETHER COMPLICATED, UNSPECIFIED WHETHER PERSISTENT (HHS-HCC): ICD-10-CM

## 2025-05-28 DIAGNOSIS — M54.41 CHRONIC BILATERAL LOW BACK PAIN WITH SCIATICA, SCIATICA LATERALITY UNSPECIFIED: ICD-10-CM

## 2025-05-28 DIAGNOSIS — R73.03 PREDIABETES: ICD-10-CM

## 2025-05-28 DIAGNOSIS — E78.5 HYPERLIPIDEMIA, UNSPECIFIED HYPERLIPIDEMIA TYPE: ICD-10-CM

## 2025-05-28 PROBLEM — M71.30 CYST OF BURSA: Status: ACTIVE | Noted: 2021-11-14

## 2025-05-28 RX ORDER — ALBUTEROL SULFATE 90 UG/1
2 INHALANT RESPIRATORY (INHALATION) EVERY 4 HOURS PRN
Qty: 18 G | Refills: 3 | Status: SHIPPED | OUTPATIENT
Start: 2025-05-28

## 2025-05-28 ASSESSMENT — ENCOUNTER SYMPTOMS
DEPRESSION: 0
LOSS OF SENSATION IN FEET: 0
OCCASIONAL FEELINGS OF UNSTEADINESS: 1

## 2025-05-28 ASSESSMENT — PATIENT HEALTH QUESTIONNAIRE - PHQ9
1. LITTLE INTEREST OR PLEASURE IN DOING THINGS: NOT AT ALL
2. FEELING DOWN, DEPRESSED OR HOPELESS: NOT AT ALL
SUM OF ALL RESPONSES TO PHQ9 QUESTIONS 1 AND 2: 0

## 2025-05-28 ASSESSMENT — ACTIVITIES OF DAILY LIVING (ADL)
DRESSING: INDEPENDENT
BATHING: INDEPENDENT
TAKING_MEDICATION: INDEPENDENT
MANAGING_FINANCES: INDEPENDENT
DOING_HOUSEWORK: INDEPENDENT
GROCERY_SHOPPING: INDEPENDENT

## 2025-05-28 NOTE — PROGRESS NOTES
65-year-old female presenting for follow-up on multiple concerns.  Mostly stable.    HTN  Stable without medication.    HLD  Stable without medication.  Was previously on statin.  Discontinued, but no side effects.    Prediabetes  Was on metformin, has not been taking.    Rheumatoid arthritis  Previously following with rheumatology, lost to follow-up.  Not on any medications, condition has been worsening.    Asthma  Stable on albuterol as needed    Arnold-Chiari syndrome  Stable without symptoms.    Chronic low back pain with sciatica  Had success in the past with nerve RFA.  Would like to go back to pain management.    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    Increased urinary frequency recently, no dysuria.    SocHx:   - Smoking: Quit 40 years ago    12 point ROS reviewed and negative other than as stated in HPI      General: Alert, oriented, pleasant, in no acute distress  HEENT:      Head: normocephalic, atraumatic;      eyes: EOMI, no scleral icterus;   Neck: soft, supple, non-tender, no masses appreciated  CV: Heart with regular rate and rhythm, normal S1/S2, no murmurs  Lungs: CTAB without wheezing, rhonchi or rales; good respiratory effort, no increased work of breathing  Abdomen: soft, non-tender, non-distended, no masses appreciated  Extremities: Trace edema bilaterally  Neuro: Cranial nerves grossly intact; alert and oriented  Psych: Appropriate mood and affect    #HTN  -Slightly above goal in office  - Not currently on any medications  -Home blood pressure logs given, goal 130/80, may consider treatment if persistently above  - Repeat CMP    #HLD   - Currently denies any chest pain, recent nuclear stress test negative, echo negative  -Was previously on statin, but stopped taking, states she was on too many medications  - Will get CT calcium score to better elucidate need for statin  - Repeat lipid panel    #Prediabetes #obesity  - Previously taking metformin, currently  unmedicated  -Interested in GLP-1 therapy, [facility] pharmacist to check for coverage    #Rheumatoid arthritis  - Referral back to rheumatology    #Asthma  - Continue albuterol as needed    #Arnold-Chiari syndrome  - Asymptomatic    #Multinodular goiter  - Previously on levothyroxine 112 mcg daily currently not taking medication  - Repeat TSH    #Increased urinary frequency  -UA, culture  -No empiric medication sent    #Chronic back pain with sciatica  -Pain medicine referral    F/U 4-6 months, Medicare due in October    Christopher D'Amico, DO    Answers submitted by the patient for this visit:  Neurological Problem Questionnaire (Submitted on 5/27/2025)  Chief Complaint: Neurologic complaint  clumsiness: No  altered mental status: No  syncope: No  loss of balance: No  focal sensory loss: Yes  memory loss: No  near-syncope: No  slurred speech: No  visual change: Yes  weakness: No  focal weakness: Yes  Chronicity: new  Onset: more than 1 month ago  Onset quality: gradually  Progression since onset: waxing and waning  Focality: lower extremity  abdominal pain: No  aura: No  back pain: No  bladder incontinence: Yes  bowel incontinence: No  chest pain: No  confusion: No  dizziness: No  fatigue: No  vertigo: No  fever: No  headaches: No  auditory change: No  light-headedness: No  nausea: No  neck pain: No  palpitations: No  shortness of breath: Yes  diaphoresis: No  Treatments tried: acetaminophen, aspirin, position change, walking  Improvement on treatment: no relief

## 2025-05-30 LAB
ALBUMIN SERPL-MCNC: 4.4 G/DL (ref 3.6–5.1)
ALP SERPL-CCNC: 119 U/L (ref 37–153)
ALT SERPL-CCNC: 14 U/L (ref 6–29)
ANION GAP SERPL CALCULATED.4IONS-SCNC: 6 MMOL/L (CALC) (ref 7–17)
APPEARANCE UR: CLEAR
AST SERPL-CCNC: 18 U/L (ref 10–35)
BACTERIA #/AREA URNS HPF: ABNORMAL /HPF
BACTERIA UR CULT: NORMAL
BILIRUB SERPL-MCNC: 0.5 MG/DL (ref 0.2–1.2)
BILIRUB UR QL STRIP: NEGATIVE
BUN SERPL-MCNC: 15 MG/DL (ref 7–25)
CALCIUM SERPL-MCNC: 10 MG/DL (ref 8.6–10.4)
CHLORIDE SERPL-SCNC: 106 MMOL/L (ref 98–110)
CHOLEST SERPL-MCNC: 232 MG/DL
CHOLEST/HDLC SERPL: 3.1 (CALC)
CO2 SERPL-SCNC: 29 MMOL/L (ref 20–32)
COLOR UR: ABNORMAL
CREAT SERPL-MCNC: 0.65 MG/DL (ref 0.5–1.05)
EGFRCR SERPLBLD CKD-EPI 2021: 97 ML/MIN/1.73M2
ERYTHROCYTE [DISTWIDTH] IN BLOOD BY AUTOMATED COUNT: 14 % (ref 11–15)
EST. AVERAGE GLUCOSE BLD GHB EST-MCNC: 131 MG/DL
EST. AVERAGE GLUCOSE BLD GHB EST-SCNC: 7.3 MMOL/L
GLUCOSE SERPL-MCNC: 83 MG/DL (ref 65–99)
GLUCOSE UR QL STRIP: NEGATIVE
HBA1C MFR BLD: 6.2 %
HCT VFR BLD AUTO: 38.3 % (ref 35–45)
HDLC SERPL-MCNC: 74 MG/DL
HGB BLD-MCNC: 12.3 G/DL (ref 11.7–15.5)
HGB UR QL STRIP: NEGATIVE
HYALINE CASTS #/AREA URNS LPF: ABNORMAL /LPF
KETONES UR QL STRIP: NEGATIVE
LDLC SERPL CALC-MCNC: 132 MG/DL (CALC)
LEUKOCYTE ESTERASE UR QL STRIP: ABNORMAL
MCH RBC QN AUTO: 28.9 PG (ref 27–33)
MCHC RBC AUTO-ENTMCNC: 32.1 G/DL (ref 32–36)
MCV RBC AUTO: 90.1 FL (ref 80–100)
NITRITE UR QL STRIP: NEGATIVE
NONHDLC SERPL-MCNC: 158 MG/DL (CALC)
PH UR STRIP: 5.5 [PH] (ref 5–8)
PLATELET # BLD AUTO: 233 THOUSAND/UL (ref 140–400)
PMV BLD REES-ECKER: 11.1 FL (ref 7.5–12.5)
POTASSIUM SERPL-SCNC: 3.9 MMOL/L (ref 3.5–5.3)
PROT SERPL-MCNC: 7.4 G/DL (ref 6.1–8.1)
PROT UR QL STRIP: NEGATIVE
RBC # BLD AUTO: 4.25 MILLION/UL (ref 3.8–5.1)
RBC #/AREA URNS HPF: ABNORMAL /HPF
SERVICE CMNT-IMP: ABNORMAL
SODIUM SERPL-SCNC: 141 MMOL/L (ref 135–146)
SP GR UR STRIP: 1.02 (ref 1–1.03)
SQUAMOUS #/AREA URNS HPF: ABNORMAL /HPF
TRIGL SERPL-MCNC: 151 MG/DL
TSH SERPL-ACNC: 1.13 MIU/L (ref 0.4–4.5)
WBC # BLD AUTO: 5.4 THOUSAND/UL (ref 3.8–10.8)
WBC #/AREA URNS HPF: ABNORMAL /HPF

## 2025-05-30 NOTE — RESULT ENCOUNTER NOTE
LDL moderately elevated at 132, borderline elevated triglycerides at 151, very good HDL at 74.  ASCVD 10-year risk score is slightly elevated at 11.4%.  Continue with CT calcium score discussed in office to help determine whether treatment is warranted.    Hemoglobin A1c at 6.2.  Still prediabetic, unfortunately this means GLP-1 coverage is not great.  Should definitely work on diet, recommend Mediterranean diet, reduction in processed foods.    Urine with trace leukocyte esterase, however, urine culture not finding infectious organism.  Not sure that there would be any benefit in taking antibiotics, given that there is no discomfort with urination either.    Remaining labs essentially unremarkable.

## 2025-06-02 ENCOUNTER — OFFICE VISIT (OUTPATIENT)
Dept: PAIN MEDICINE | Facility: HOSPITAL | Age: 65
End: 2025-06-02
Payer: MEDICARE

## 2025-06-02 ENCOUNTER — HOSPITAL ENCOUNTER (OUTPATIENT)
Dept: RADIOLOGY | Facility: HOSPITAL | Age: 65
Discharge: HOME | End: 2025-06-02
Payer: MEDICARE

## 2025-06-02 DIAGNOSIS — N39.41 URGE INCONTINENCE OF URINE: ICD-10-CM

## 2025-06-02 DIAGNOSIS — M43.16 SPONDYLOLISTHESIS OF LUMBAR REGION: ICD-10-CM

## 2025-06-02 DIAGNOSIS — M54.41 CHRONIC BILATERAL LOW BACK PAIN WITH SCIATICA, SCIATICA LATERALITY UNSPECIFIED: ICD-10-CM

## 2025-06-02 DIAGNOSIS — M54.42 CHRONIC BILATERAL LOW BACK PAIN WITH SCIATICA, SCIATICA LATERALITY UNSPECIFIED: ICD-10-CM

## 2025-06-02 DIAGNOSIS — M71.38 SYNOVIAL CYST OF LUMBAR SPINE: ICD-10-CM

## 2025-06-02 DIAGNOSIS — M54.16 LUMBAR RADICULOPATHY: ICD-10-CM

## 2025-06-02 DIAGNOSIS — G89.29 CHRONIC BILATERAL LOW BACK PAIN WITH SCIATICA, SCIATICA LATERALITY UNSPECIFIED: ICD-10-CM

## 2025-06-02 DIAGNOSIS — M96.1 LUMBAR POSTLAMINECTOMY SYNDROME: ICD-10-CM

## 2025-06-02 DIAGNOSIS — M54.16 LUMBAR RADICULOPATHY: Primary | ICD-10-CM

## 2025-06-02 DIAGNOSIS — Z98.1 HISTORY OF LUMBAR FUSION: ICD-10-CM

## 2025-06-02 PROCEDURE — 72120 X-RAY BEND ONLY L-S SPINE: CPT

## 2025-06-02 PROCEDURE — 72110 X-RAY EXAM L-2 SPINE 4/>VWS: CPT | Performed by: RADIOLOGY

## 2025-06-02 PROCEDURE — 99204 OFFICE O/P NEW MOD 45 MIN: CPT | Performed by: PAIN MEDICINE

## 2025-06-02 PROCEDURE — 99214 OFFICE O/P EST MOD 30 MIN: CPT | Performed by: PAIN MEDICINE

## 2025-06-02 PROCEDURE — 1125F AMNT PAIN NOTED PAIN PRSNT: CPT | Performed by: PAIN MEDICINE

## 2025-06-02 RX ORDER — METHYLPREDNISOLONE 4 MG/1
TABLET ORAL
Qty: 21 TABLET | Refills: 0 | Status: SHIPPED | OUTPATIENT
Start: 2025-06-02 | End: 2025-06-08

## 2025-06-02 ASSESSMENT — PAIN - FUNCTIONAL ASSESSMENT: PAIN_FUNCTIONAL_ASSESSMENT: 0-10

## 2025-06-02 ASSESSMENT — PAIN SCALES - GENERAL: PAINLEVEL_OUTOF10: 8

## 2025-06-02 ASSESSMENT — PAIN DESCRIPTION - DESCRIPTORS: DESCRIPTORS: NUMBNESS;TINGLING;SHOOTING

## 2025-06-02 NOTE — PROGRESS NOTES
"Subjective   Patient ID: Bettina Kowalski is a 65 y.o. female with a past medical history of obesity, chronic low back pain, right hip osteoarthritis s/p total hip replacement 02/2025, left knee osteoarthritis s/p TKA 03/2024, s/p L3-L5 laminectomy and fusion with L4-L5 TLIF 11/8/2021, who presents today for low back pain with radicular symptoms to the bilateral lower extremities posterior laterally. Patient states that she had had low back pain radiating to her bilateral lower extremities posterior laterally for a few years, but symptoms significantly worsened after her right total hip replacement.  She has difficulty sitting for prolonged period of time, and pain is especially worsened by walking for prolonged period of time.  She has attempted physical therapy without significant relief, as well as oral medications including acetaminophen, NSAIDs, gabapentinoids.  She has not attempted oral steroids yet.  She has had weakness in the bilateral extremities, that is worse in the lateral, as well as difficulty controlling her bladder.  She states that she was worked up with labs, and UTI was ruled out.  She was told that everything was okay with her bladder, though she has a history of bladder prolapse.  The pain causes significant stress in the patient's life, interfering with general activity, mood, ability to walk distances, ability to perform tasks at home and/or work. Patient participates in physical therapy, and continues to perform physician directed exercises at home. Patient denies any bowel or bladder incontinence, saddle anesthesia, weaknesses, or falls.      Review of Systems   13-point ROS done and negative except for HPI.     Current Outpatient Medications   Medication Instructions    albuterol 90 mcg/actuation inhaler 2 puffs, inhalation, Every 4 hours PRN    BD Ultra-Fine Mini Pen Needle 31 gauge x 3/16\" needle     metFORMIN (GLUCOPHAGE) 500 mg, oral, 2 times daily (morning and late afternoon)    " methylPREDNISolone (Medrol Dospak) 4 mg tablets Follow schedule on package instructions    NON FORMULARY Disability parking placard needed for permanent lifetime orthopaedic disability.       Medical History[1]     Surgical History[2]     Family History[3]     RX Allergies[4]     Objective     There were no vitals filed for this visit.     Physical Exam  General: NAD, well groomed, well nourished  Eyes: Non-icteric sclera, EOMI  Ears, Nose, Mouth, and Throat: External ears and nose appear to be without deformity or rash. No lesions or masses noted. Hearing is grossly intact.   Neck: Supple, trachea midline, no appreciable lumps or lymph nodes  Respiratory: Nonlabored breathing   Cardiovascular: No peripheral edema observed  Skin: No rashes or open lesions/ulcers identified on skin.  Psychiatric: Alert, orientation to person, place, and time. Cooperative.    Neurologic:   Cranial nerves grossly intact.   Strength: 5/5 and symmetric plantar/dorsiflexion   Sensation: Normal to light touch throughout; pinprick intact throughout.   DTRs:normal and symmetric throughout  Jackson: absent  Clonus: absent    Back:   Palpation: Tenderness to palpation over lumbar paraspinous muscles.   Straight leg raise: positive at 60 degrees bilaterally  MUSA Maneuver does not reproduce pain bilaterally  Facet Loading test: absent on lumbar spine    Hip: Pain not reproduced with hip internal/external rotation. , Pain over right greater trochanter., and Pain over left greater trochanter.    Imaging personally reviewed and independently interpreted:   XR hip right with pelvis when performed 2 or 3 views 04/11/2025    Narrative  Interpreted By:  Elizabeth Milian,  STUDY:  Single view pelvis.  Two views right hip.    INDICATION:  Signs/Symptoms:BARB f/up doing well    COMPARISON:  02/06/2025.    ACCESSION NUMBER(S):  [id number]    ORDERING CLINICIAN:  KIRBY MINA    FINDINGS:  No acute fracture or malalignment.  Patient is status post right  total hip arthroplasty. No perihardware  fractures or lucencies. Alignment is anatomic. Left acetabular  protrusio and moderate left hip arthritis observed.    Impression  1.  Right total hip arthroplasty without hardware complication.  2. Left acetabular protrusio and moderate left hip arthritis.    MACRO:  None.    Signed by: Elizabeth Milian 4/12/2025 9:21 AM  Dictation workstation:   [id number]      XR hip right with pelvis when performed 2 or 3 views 02/06/2025    Narrative  Interpreted By:  Mike Waldron,  STUDY:  XR HIP RIGHT WITH PELVIS WHEN PERFORMED 2 OR 3 VIEWS    INDICATION:  Signs/Symptoms:Post op hip.    COMPARISON:  January 25    ACCESSION NUMBER(S):  [id number]    ORDERING CLINICIAN:  KIRBY MINA    FINDINGS:  Postsurgical changes of right total hip arthroplasty without  fracture, malalignment, or periprosthetic lucency.    Impression  Satisfactory appearance status post right hip arthroplasty.    Signed by: Mike Waldron 2/6/2025 1:56 PM  Dictation workstation:   [id number]      XR hip right with pelvis when performed 2 or 3 views 01/25/2025    Narrative  Interpreted By:  Marco Kearney,  STUDY:  XR HIP RIGHT WITH PELVIS WHEN PERFORMED 2 OR 3 VIEWS;  1/25/2025 9:36  am    INDICATION:  Signs/Symptoms:preop.    COMPARISON:  11/18/2024.    ACCESSION NUMBER(S):  [id number]    ORDERING CLINICIAN:  KIRBY MINA    FINDINGS:  Partially imaged lower lumbar fusion hardware. No pelvic fracture  identified. No acute fracture or dislocation of the right hip.  Moderate joint space narrowing. Slight acetabular protrusio.  Subchondral sclerosis and osteophyte formation.    Impression  Moderate to severe degenerative changes of the right hip.    MACRO:  None    Signed by: Marco Kearney 1/27/2025 9:32 AM  Dictation workstation:   [id number]      XR hip right with pelvis when performed 2 or 3 views 11/18/2024    Narrative  Interpreted By:  Juan Minaya,  STUDY:  XR HIP RIGHT WITH PELVIS WHEN PERFORMED 2 OR 3  VIEWS; ;  11/18/2024  1:30 pm    INDICATION:  Signs/Symptoms:pain.    ,M16.11 Unilateral primary osteoarthritis, right hip    COMPARISON:  02/16/2023    ACCESSION NUMBER(S):  [id number]    ORDERING CLINICIAN:  KIRBY MINA    FINDINGS:  Right hip, three views    There is moderate joint space narrowing osteophytosis and sclerosis  in the hips bilaterally with acetabular protrusio. No fracture or  dislocation seen    Impression  Moderate bilateral hip osteoarthritis with acetabular protrusion      MACRO:  None    Signed by: Juan Minaya 11/19/2024 6:35 PM  Dictation workstation:   [id number]     No image results found.     1. Lumbar radiculopathy  XR lumbar spine 4+ views w flexion extension    MR lumbar spine wo IV contrast    methylPREDNISolone (Medrol Dospak) 4 mg tablets      2. Chronic bilateral low back pain with sciatica, sciatica laterality unspecified  Referral to Pain Medicine    XR lumbar spine 4+ views w flexion extension    MR lumbar spine wo IV contrast    methylPREDNISolone (Medrol Dospak) 4 mg tablets      3. Lumbar postlaminectomy syndrome  XR lumbar spine 4+ views w flexion extension    MR lumbar spine wo IV contrast    methylPREDNISolone (Medrol Dospak) 4 mg tablets      4. History of lumbar fusion  XR lumbar spine 4+ views w flexion extension    MR lumbar spine wo IV contrast    methylPREDNISolone (Medrol Dospak) 4 mg tablets      5. Synovial cyst of lumbar spine  XR lumbar spine 4+ views w flexion extension    methylPREDNISolone (Medrol Dospak) 4 mg tablets      6. Spondylolisthesis of lumbar region  XR lumbar spine 4+ views w flexion extension    methylPREDNISolone (Medrol Dospak) 4 mg tablets      7. Urge incontinence of urine  XR lumbar spine 4+ views w flexion extension    MR lumbar spine wo IV contrast           Assessment/Plan   Bettina JOCELYNE Dex is a 65 y.o. female with a past medical history of obesity, chronic low back pain, right hip osteoarthritis s/p total hip replacement 02/2025,  left knee osteoarthritis s/p TKA 03/2024, s/p L3-L5 laminectomy and fusion with L4-L5 TLIF 11/8/2021, who presents today for low back pain with radicular symptoms to the bilateral lower extremities posterior laterally. Patient states that she had had low back pain radiating to her bilateral lower extremities posterior laterally for a few years, but symptoms significantly worsened after her right total hip replacement. Patient's symptoms appear consistent with lumbar spinal stenosis with neurogenic claudication.  She does have bilateral lower extremity weakness worse in the right lower extremity, as well as worsening urinary incontinence previously worked up with urine labs without signs of UTI.  I will need MRI of the lumbar spine or urgently given her urinary incontinence bilateral lower extremity weakness.  This imaging will also be helpful to find causes of lumbar spinal stenosis.    Plan:  - We will order x-ray lumbar spine flexion/extension today.  - We will order MRI lumbar spine stat as above.  - Will start patient on Medrol Dosepak to provide pain control until we are able to evaluate and plan for possible interventions pending results of MRI lumbar spine.  We may consider an epidural steroid injection.  The patient has already failed conservative therapies including medications such as acetaminophen, NSAIDs, and gabapentinoids; as well as physical therapy. Therefore, we discussed extensively the risks, benefits, and alternatives to the procedure. The patient's questions were addressed and answered in detail. The patient demonstrated understanding of the procedure, and is amenable to proceeding with it. The Risks of the procedure that were discussed with the patient include but are not limited to the following: A lack of efficacy, transient worsening of pain, bleeding, infection, nerve injury, nerve damage, neuritis or sunburn sensation, worsening control of blood glucose from steroid medication, epidural  hematoma, posterior puncture headache, and paralysis.  We also discussed the increased risk of infection and wound dehiscence associated with smoking, obesity, and diabetes.     Follow up: After MRI    The patient was invited to contact us back anytime with any questions or concerns and follow-up with us in the office as needed.     Diagnoses and all orders for this visit:  Lumbar radiculopathy  -     XR lumbar spine 4+ views w flexion extension; Future  -     MR lumbar spine wo IV contrast; Future  -     methylPREDNISolone (Medrol Dospak) 4 mg tablets; Follow schedule on package instructions  Chronic bilateral low back pain with sciatica, sciatica laterality unspecified  -     Referral to Pain Medicine  -     XR lumbar spine 4+ views w flexion extension; Future  -     MR lumbar spine wo IV contrast; Future  -     methylPREDNISolone (Medrol Dospak) 4 mg tablets; Follow schedule on package instructions  Lumbar postlaminectomy syndrome  -     XR lumbar spine 4+ views w flexion extension; Future  -     MR lumbar spine wo IV contrast; Future  -     methylPREDNISolone (Medrol Dospak) 4 mg tablets; Follow schedule on package instructions  History of lumbar fusion  -     XR lumbar spine 4+ views w flexion extension; Future  -     MR lumbar spine wo IV contrast; Future  -     methylPREDNISolone (Medrol Dospak) 4 mg tablets; Follow schedule on package instructions  Synovial cyst of lumbar spine  -     XR lumbar spine 4+ views w flexion extension; Future  -     methylPREDNISolone (Medrol Dospak) 4 mg tablets; Follow schedule on package instructions  Spondylolisthesis of lumbar region  -     XR lumbar spine 4+ views w flexion extension; Future  -     methylPREDNISolone (Medrol Dospak) 4 mg tablets; Follow schedule on package instructions  Urge incontinence of urine  -     XR lumbar spine 4+ views w flexion extension; Future  -     MR lumbar spine wo IV contrast; Future      This note was generated with the aid of dictation  software, there may be typos despite my attempts at proofreading.     Marta Keating MD  Interventional Pain Medicine Fellow  [facility]          [1]   Past Medical History:  Diagnosis Date    Anemia     Arthritis unsure    unsure of the year    Asthma     stable    B12 deficiency     Cataract, bilateral     Cerebral herniation (Multi)     Chronic insomnia     Depression     Diverticulosis     2013    History of blood transfusion 1984    History of Chiari malformation     stable no symptoms    History of prediabetes     Hyperlipidemia     stable without medication    Hypertension     stable without medication    Joint pain 2013    Multinodular goiter     presently no medication    Neuromuscular disorder (Multi) 11/2024    Obesity     Pelvic floor weakness     Prediabetes     Primary osteoarthritis of right hip     Plan: Right Hip Total Arthroplasty Anterior Approach 1/7/25    Right lumbar radiculopathy     Seropositive rheumatoid arthritis    [2]   Past Surgical History:  Procedure Laterality Date    BACK SURGERY  unsure    BLADDER SUSPENSION  1995    BRAIN SURGERY  unsure    CARPAL TUNNEL RELEASE  1999    COLONOSCOPY      EYE SURGERY  unsure    HYSTERECTOMY  06/17/2009    LUMBAR FUSION      OTHER SURGICAL HISTORY      Skull Excision Subfascial Soft Tissue Tumor    TOTAL KNEE ARTHROPLASTY Left     TRANSOBTURATOR SLING     [3]   Family History  Problem Relation Name Age of Onset    Stroke Mother Cheryl eMng     Transient ischemic attack Mother Cheryl Meng     Arthritis Mother Cheryl Meng     Diabetes Mother Cheryl Meng     Breast cancer Maternal Grandmother      Diabetes Maternal Grandmother      Coronary artery disease Maternal Grandmother      Vision loss Maternal Grandmother      Breast cancer Mother's Sister Laura Berrios 74    Cancer Mother's Sister Laura Berrios22     Cancer Other Laura Berrios     Miscarriages / Stillbirths Other Bettina Kowalski     [4]   Allergies  Allergen Reactions    Metronidazole Hives

## 2025-06-03 ENCOUNTER — APPOINTMENT (OUTPATIENT)
Dept: PRIMARY CARE | Facility: CLINIC | Age: 65
End: 2025-06-03
Payer: MEDICARE

## 2025-06-09 ENCOUNTER — HOSPITAL ENCOUNTER (OUTPATIENT)
Dept: RADIOLOGY | Facility: HOSPITAL | Age: 65
Discharge: HOME | End: 2025-06-09
Payer: MEDICARE

## 2025-06-09 DIAGNOSIS — M54.41 CHRONIC BILATERAL LOW BACK PAIN WITH SCIATICA, SCIATICA LATERALITY UNSPECIFIED: ICD-10-CM

## 2025-06-09 DIAGNOSIS — M96.1 LUMBAR POSTLAMINECTOMY SYNDROME: ICD-10-CM

## 2025-06-09 DIAGNOSIS — M54.16 LUMBAR RADICULOPATHY: ICD-10-CM

## 2025-06-09 DIAGNOSIS — G89.29 CHRONIC BILATERAL LOW BACK PAIN WITH SCIATICA, SCIATICA LATERALITY UNSPECIFIED: ICD-10-CM

## 2025-06-09 DIAGNOSIS — M54.42 CHRONIC BILATERAL LOW BACK PAIN WITH SCIATICA, SCIATICA LATERALITY UNSPECIFIED: ICD-10-CM

## 2025-06-09 DIAGNOSIS — Z98.1 HISTORY OF LUMBAR FUSION: ICD-10-CM

## 2025-06-09 DIAGNOSIS — N39.41 URGE INCONTINENCE OF URINE: ICD-10-CM

## 2025-06-09 PROCEDURE — 72148 MRI LUMBAR SPINE W/O DYE: CPT | Performed by: RADIOLOGY

## 2025-06-09 PROCEDURE — 72148 MRI LUMBAR SPINE W/O DYE: CPT

## 2025-06-10 DIAGNOSIS — M54.16 LUMBAR RADICULOPATHY: ICD-10-CM

## 2025-06-13 ENCOUNTER — APPOINTMENT (OUTPATIENT)
Dept: ORTHOPEDIC SURGERY | Facility: CLINIC | Age: 65
End: 2025-06-13
Payer: MEDICARE

## 2025-06-16 ENCOUNTER — APPOINTMENT (OUTPATIENT)
Dept: PHYSICAL THERAPY | Facility: CLINIC | Age: 65
End: 2025-06-16
Payer: MEDICARE

## 2025-06-24 ENCOUNTER — HOSPITAL ENCOUNTER (OUTPATIENT)
Facility: HOSPITAL | Age: 65
Discharge: HOME | End: 2025-06-24
Payer: MEDICARE

## 2025-06-24 VITALS
HEART RATE: 64 BPM | DIASTOLIC BLOOD PRESSURE: 73 MMHG | BODY MASS INDEX: 34.66 KG/M2 | TEMPERATURE: 99 F | HEIGHT: 64 IN | OXYGEN SATURATION: 99 % | WEIGHT: 203 LBS | RESPIRATION RATE: 16 BRPM | SYSTOLIC BLOOD PRESSURE: 146 MMHG

## 2025-06-24 DIAGNOSIS — M54.16 LUMBAR RADICULOPATHY: ICD-10-CM

## 2025-06-24 PROCEDURE — 64483 NJX AA&/STRD TFRM EPI L/S 1: CPT | Performed by: PAIN MEDICINE

## 2025-06-24 PROCEDURE — 64483 NJX AA&/STRD TFRM EPI L/S 1: CPT | Mod: 50 | Performed by: PAIN MEDICINE

## 2025-06-24 PROCEDURE — 2550000001 HC RX 255 CONTRASTS: Mod: JW | Performed by: PAIN MEDICINE

## 2025-06-24 PROCEDURE — 2500000004 HC RX 250 GENERAL PHARMACY W/ HCPCS (ALT 636 FOR OP/ED): Mod: JZ | Performed by: PAIN MEDICINE

## 2025-06-24 RX ORDER — ASPIRIN 81 MG/1
81 TABLET ORAL DAILY
COMMUNITY

## 2025-06-24 RX ORDER — METHYLPREDNISOLONE ACETATE 40 MG/ML
INJECTION, SUSPENSION INTRA-ARTICULAR; INTRALESIONAL; INTRAMUSCULAR; SOFT TISSUE
Status: COMPLETED | OUTPATIENT
Start: 2025-06-24 | End: 2025-06-24

## 2025-06-24 RX ORDER — LIDOCAINE HYDROCHLORIDE 5 MG/ML
INJECTION, SOLUTION INFILTRATION; INTRAVENOUS
Status: COMPLETED | OUTPATIENT
Start: 2025-06-24 | End: 2025-06-24

## 2025-06-24 RX ADMIN — IOHEXOL 1.5 ML: 240 INJECTION, SOLUTION INTRATHECAL; INTRAVASCULAR; INTRAVENOUS; ORAL at 13:00

## 2025-06-24 RX ADMIN — METHYLPREDNISOLONE ACETATE 40 MG: 40 INJECTION, SUSPENSION INTRA-ARTICULAR; INTRALESIONAL; INTRAMUSCULAR; SOFT TISSUE at 13:00

## 2025-06-24 RX ADMIN — LIDOCAINE HYDROCHLORIDE 4 ML: 5 INJECTION, SOLUTION INFILTRATION at 12:59

## 2025-06-24 ASSESSMENT — PAIN SCALES - GENERAL
PAINLEVEL_OUTOF10: 5 - MODERATE PAIN
PAINLEVEL_OUTOF10: 8
PAINLEVEL_OUTOF10: 5 - MODERATE PAIN
PAINLEVEL_OUTOF10: 5 - MODERATE PAIN

## 2025-06-24 ASSESSMENT — PAIN - FUNCTIONAL ASSESSMENT
PAIN_FUNCTIONAL_ASSESSMENT: 0-10
PAIN_FUNCTIONAL_ASSESSMENT: WONG-BAKER FACES
PAIN_FUNCTIONAL_ASSESSMENT: WONG-BAKER FACES
PAIN_FUNCTIONAL_ASSESSMENT: 0-10

## 2025-06-24 NOTE — DISCHARGE INSTRUCTIONS
DISCHARGE INSTRUCTIONS FOR INJECTIONS     You underwent bilateral lumbar transforaminal epidural steroid injection today    After most injections, it is recommended that you relax and limit your activity for the remainder of the day unless you have been told otherwise by your pain physician.  You should not drive a car, operate machinery, or make important legal decisions unless otherwise directed by your pain physician.  You may resume your normal activity, including exercise, tomorrow.      Keep a written pain diary of how much pain relief you experienced following the injection procedure and the length of time of pain relief you experienced pain relief. Following diagnostic injections like medial branch nerve blocks, sacroiliac joint blocks, stellate ganglion injections and other blocks, it is very important you record the specific amount of pain relief you experienced immediately after the injectionand how long it lasted. Your doctor will ask you for this information at your follow up visit.     For all injections, please keep the injection site dry and inspect the site for a couple of days. You may remove the Band-Aid the day of the injection at any time.     Some discomfort, bruising or slight swelling may occur at the injection site. This is not abnormal if it occurs.  If needed you may:    -Take over the counter medication such as Tylenol or Motrin.   -Apply an ice pack for 30 minutes, 2 to 3 times a day for the first 24 hours.     You may shower today; no soaking baths, hot tubs, whirlpools or swimming pools for two days.      If you are given steroids in your injection, it may take 3-5 days for the steroid medication to take effect. You may notice a worsening of your symptoms for 1-2 days after the injection. This is not abnormal.  You may use acetaminophen, ibuprofen, or prescription medication that your doctor may have prescribed for you if you need to do so.     A few common side effects of steroids  include facial flushing, sweating, restlessness, irritability,difficulty sleeping, increase in blood sugar, and increased blood pressure. If you have diabetes, please monitor your blood sugar at least once a day for at least 5 days. If you have poorly controlled high blood pressure, monitoryour blood pressure for at least 2 days and contact your primary care physician if these numbers are unusually high for you.      If you take aspirin or non-steroidal anti-inflammatory drugs (examples are Motrin, Advil, ibuprofen, Naprosyn, Voltaren, Relafen, etc.) you may restart these this evening, but stop taking it 3 days before your next appointment, unless instructed otherwiseby your physician.      You do not need to discontinue non-aspirin-containing pain medications prior to an injection (examples: Celebrex, tramadol, hydrocodone and acetaminophen).      If you take a blood thinning medication (Coumadin, Lovenox, Fragmin,Ticlid, Plavix, Pradaxa, etc.), please discuss this with your primary care physician/cardiologist and your pain physician. These medications MUST be discontinued before you can have an injection safely, without the risk of uncontrolled bleeding. If these medications are not discontinued for an appropriate period of time, you will not be able to receivean injection. Please adhere to instructions given to you about when to restart your blood thinning medication. If you have any questions please reach out to our team.    If you are taking Coumadin, please have your INR checked the morning of your procedure and bring the result to your appointment unless otherwise instructed. If your INR is over 1.2, your injection may need to be rescheduled to avoid uncontrolled bleeding from the needle placement.     Call [facility]  and ask for Pain Management at [phone] between 8am-4pm Monday - Friday if you are experiencing the following:    If you received an epidural or spinal injection:    -Headache that doesnot  go away with medicine, is worse when sitting or standing up, and is greatly relieved upon lying down.   -Severe pain worse than or different than your baseline pain.   -Chills or fever (101º F or greater).   -Drainage or signs of infection at the injection site     Go directly to the Emergency Department if you are experiencing the following and received an epidural or spinal injection:   -Abrupt weakness or progressive weakness in your legs that starts after you leave the clinic.   -Abrupt severe or worsening numbness in your legs.   -Inability to urinate after the injection or loss of bowel or bladder control without the urge to defecate or urinate.     If you have a clinical question that cannot wait until your next appointment, please call [phone] between 8am-4pm Monday - Friday or send a [employer] message. We do our best to return all non-emergency messages within 24 hours, Monday - Friday. A nurse or physician will return your message. You may also try calling and they will do their best to answer your question(s):  - Dr. Zane Whitehead's nurse ([phone])  - Dr. Nevaeh Owen/Dr. Rahman's nurse ([phone])  - Dr. Marli Gutierrez/Marli's nurse ([phone])     If you need to cancel an appointment, please call the scheduling staff at [phone] during normal business hours or leave a message at least 24 hours in advance.     If you are going to be sedated for your next procedure, you MUST have responsible adult who can legally drive accompany you home. You cannot eat or drink for at least eight hours prior to the planned procedure if you are going to receive sedation. You may take your non-blood thinning medications with a small sip of water.

## 2025-06-24 NOTE — H&P
Pain Management H&P    History Of Present Illness  Bettina Kowalski is a 65 y.o. female presents for procedure state below. Endorses no changes in past medical history or medical health since last seen in clinic.      Past Medical History  She has a past medical history of Anemia, Arthritis (unsure), Asthma, B12 deficiency, Cataract, bilateral, Cerebral herniation (Multi), Chronic insomnia, Depression, Diverticulosis, History of blood transfusion (1984), History of Chiari malformation, History of prediabetes, Hyperlipidemia, Hypertension, Joint pain (2013), Multinodular goiter, Neuromuscular disorder (Multi) (11/2024), Obesity, Pelvic floor weakness, Prediabetes, Primary osteoarthritis of right hip, Right lumbar radiculopathy, and Seropositive rheumatoid arthritis.    She has no past medical history of Personal history of irradiation.    Surgical History  She has a past surgical history that includes Other surgical history; Hysterectomy (06/17/2009); Colonoscopy; Transobturator sling; Total knee arthroplasty (Left); Lumbar fusion; Back surgery (unsure); Eye surgery (unsure); Brain surgery (unsure); Bladder suspension (1995); and Carpal tunnel release (1999).     Social History  She reports that she quit smoking about 40 years ago. Her smoking use included cigarettes. She started smoking about 50 years ago. She has a 20 pack-year smoking history. She has never used smokeless tobacco. She reports that she does not drink alcohol and does not use drugs.    Family History  Family History[1]     Allergies  Metronidazole    Review of Symptoms:   Constitutional: Negative for chills, diaphoresis or fever  HENT: Negative for neck swelling  Eyes:.  Negative for eye pain  Respiratory:.  Negative for cough, shortness of breath or wheezing    Cardiovascular:.  Negative for chest pain or palpitations  Gastrointestinal:.  Negative for abdominal pain, nausea and vomiting  Genitourinary:.  Negative for urgency  Musculoskeletal:   "Positive for back pain. Positive for joint pain. Denies falls within the past 3 months.  Skin: Negative for wounds or itching   Neurological: Negative for dizziness, seizures, loss of consciousness and weakness  Endo/Heme/Allergies: Does not bruise/bleed easily  Psychiatric/Behavioral: Negative for depression. The patient does not appear anxious.      Pre-sedation Evaluation  ASA class 2  Mallampati score 2     PHYSICAL EXAM  Vitals signs reviewed  Constitutional:       General: Not in acute distress     Appearance: Normal appearance. Not ill-appearing.  HENT:     Head: Normocephalic and atraumatic  Eyes:     Conjunctiva/sclera: Conjunctivae normal  Cardiovascular:     Rate and Rhythm: Normal rate and regular rhythm  Pulmonary:     Effort: No respiratory distress  Abdominal:     Palpations: Abdomen is soft  Musculoskeletal: CRUZ  Skin:     General: Skin is warm and dry  Neurological:     General: No focal deficit present  Psychiatric:         Mood and Affect: Mood normal         Behavior: Behavior normal     Last Recorded Vitals  There were no vitals taken for this visit.    Relevant Results  Current Outpatient Medications   Medication Instructions    albuterol 90 mcg/actuation inhaler 2 puffs, inhalation, Every 4 hours PRN    BD Ultra-Fine Mini Pen Needle 31 gauge x 3/16\" needle     metFORMIN (GLUCOPHAGE) 500 mg, oral, 2 times daily (morning and late afternoon)    NON FORMULARY Disability parking placard needed for permanent lifetime orthopaedic disability.         MR lumbar spine wo IV contrast 06/09/2025    Narrative  Interpreted By:  Diaz Schafer,  STUDY:  MRI of the lumbar spine without IV contrast;  6/9/2025 8:00 am    INDICATION:  Signs/Symptoms:back pain - incontinence.    ,G89.29 Other chronic pain,M54.41 Lumbago with sciatica, right  side,M54.42 Lumbago with sciatica, left side,M96.1 Postlaminectomy  syndrome, not elsewhere classified,Z98.1 Arthrodesis status,M54.16  Radiculopathy, lumbar region,N39.41 Urge " incontinence    COMPARISON:  06/02/2025 lumbar radiographs and 10/06/2021 lumbar spine MR    ACCESSION NUMBER(S):  [id number]    ORDERING CLINICIAN:  DELFINO PINEDA    TECHNIQUE:  Sagittal and axial STIR and T1-weighted MRI images of the lumbar  spine were acquired using a spondylolysis protocol.  No contrast was  administered.    FINDINGS:  There are 5 lumbar type vertebrae.    Bilateral pedicle screw/javan fusion from L3-L4 with interbody spacing  device at L4-5 is similar to the recent radiographs, new from the  prior MR.    L3-4 has grade 1 anterolisthesis with mild loss of disc height,  unchanged. L4-5 has grade 1 anterolisthesis similar to the recent  radiograph.    The vertebral body marrow adjacent to the L2-3 disc has patchy areas  of degenerative edema. The disc has degenerative desiccation and loss  of height as well. There are Schmorl's nodes along the right lateral  aspect of the disc.    The tip of the spinal cord ends normally at L1.    T10-11 and T11-12: No stenoses are noted on the sagittal images.    T12-L1: No stenosis is noted.    L1-2: The facet joints are mildly to moderately arthritic.    L2-3: The right neural foramen and lateral recess are severely  stenosed secondary to new disc extrusion with fragment of disc  extending along the right lateral recess to the level of the right L2  pedicle. The fragment is 13 mm diameter.    The central canal and left lateral recess are also severely stenosed  secondary to disc extrusion and bulge along with facet hypertrophy  and ligament thickening. The facet joints are now severely arthritic.  The right neural foramen is severely stenosed with mild stenosis on  the left.    L3-4: No stenosis is noted.    L4-5: The left lateral recess is stenosed by disc bulge and endplate  spurring less so than noted previously. The thecal sac bulges  posteriorly into the laminectomy defect. Severe stenosis at this  level noted previously has cleared.    L5-S1: The facet  joints are moderately arthritic. No stenosis is  noted.    No paraspinous mass or inflammatory process is noted. Posterior  paraspinous musculature has mildly atrophied since the 2021 MR.    Impression  1. The L2-3 spinal canal and lateral recesses are severely stenosed  by new disc extrusion extending into the right foramen with a  fragment of disc extending superiorly to the right L2 pedicle.    2. Additional degenerative changes as noted.    I personally reviewed the images/study and I agree with the findings  as stated. This study was interpreted at [place], [place].    MACRO:  None    Signed by: Diaz Schafer 6/9/2025 3:14 PM  Dictation workstation:   [id number]      XR hip right with pelvis when performed 2 or 3 views 04/11/2025    Narrative  Interpreted By:  Elizabeth Milian,  STUDY:  Single view pelvis.  Two views right hip.    INDICATION:  Signs/Symptoms:BARB f/up doing well    COMPARISON:  02/06/2025.    ACCESSION NUMBER(S):  [id number]    ORDERING CLINICIAN:  KIRBY MINA    FINDINGS:  No acute fracture or malalignment.  Patient is status post right total hip arthroplasty. No perihardware  fractures or lucencies. Alignment is anatomic. Left acetabular  protrusio and moderate left hip arthritis observed.    Impression  1.  Right total hip arthroplasty without hardware complication.  2. Left acetabular protrusio and moderate left hip arthritis.    MACRO:  None.    Signed by: Elizabeth Milian 4/12/2025 9:21 AM  Dictation workstation:   [id number]      XR hip right with pelvis when performed 2 or 3 views 02/06/2025    Narrative  Interpreted By:  Mike Waldron,  STUDY:  XR HIP RIGHT WITH PELVIS WHEN PERFORMED 2 OR 3 VIEWS    INDICATION:  Signs/Symptoms:Post op hip.    COMPARISON:  January 25    ACCESSION NUMBER(S):  [id number]    ORDERING CLINICIAN:  KIRBY MINA    FINDINGS:  Postsurgical changes of right total hip arthroplasty without  fracture, malalignment,  or periprosthetic lucency.    Impression  Satisfactory appearance status post right hip arthroplasty.    Signed by: Mike Waldron 2/6/2025 1:56 PM  Dictation workstation:   [id number]      XR hip right with pelvis when performed 2 or 3 views 01/25/2025    Narrative  Interpreted By:  Marco Kearney,  STUDY:  XR HIP RIGHT WITH PELVIS WHEN PERFORMED 2 OR 3 VIEWS;  1/25/2025 9:36  am    INDICATION:  Signs/Symptoms:preop.    COMPARISON:  11/18/2024.    ACCESSION NUMBER(S):  [id number]    ORDERING CLINICIAN:  KIRBY MINA    FINDINGS:  Partially imaged lower lumbar fusion hardware. No pelvic fracture  identified. No acute fracture or dislocation of the right hip.  Moderate joint space narrowing. Slight acetabular protrusio.  Subchondral sclerosis and osteophyte formation.    Impression  Moderate to severe degenerative changes of the right hip.    MACRO:  None    Signed by: Marco Kearney 1/27/2025 9:32 AM  Dictation workstation:   [id number]      XR hip right with pelvis when performed 2 or 3 views 11/18/2024    Narrative  Interpreted By:  Juan Minaya,  STUDY:  XR HIP RIGHT WITH PELVIS WHEN PERFORMED 2 OR 3 VIEWS; ;  11/18/2024  1:30 pm    INDICATION:  Signs/Symptoms:pain.    ,M16.11 Unilateral primary osteoarthritis, right hip    COMPARISON:  02/16/2023    ACCESSION NUMBER(S):  [id number]    ORDERING CLINICIAN:  KIRBY MINA    FINDINGS:  Right hip, three views    There is moderate joint space narrowing osteophytosis and sclerosis  in the hips bilaterally with acetabular protrusio. No fracture or  dislocation seen    Impression  Moderate bilateral hip osteoarthritis with acetabular protrusion      MACRO:  None    Signed by: Juan Minaya 11/19/2024 6:35 PM  Dictation workstation:   [id number]     No image results found.       1. Lumbar radiculopathy  FL pain management    FL pain management    Transforaminal    Transforaminal           ASSESSMENT/PLAN  Bettnia Kowalski is a 65 y.o. female here for bilateral L2-3  transforaminal epidural steroid injection under fluoroscopy    Patient denies any recent antibiotic use or infections, denies any blood thinner use, and denies contrast or local anesthetic allergies     Risks, benefits, alternatives discussed. All questions answered to the best of my ability. Patient agrees to proceed.      Our plan is as follows:  - Proceed with aforementioned procedure          Patrice Alberts MD   Pain fellow          [1]   Family History  Problem Relation Name Age of Onset    Stroke Mother Cheryl Meng     Transient ischemic attack Mother Cheryl Meng     Arthritis Mother Cheryl Meng     Diabetes Mother Cheryl Meng     Breast cancer Maternal Grandmother      Diabetes Maternal Grandmother      Coronary artery disease Maternal Grandmother      Vision loss Maternal Grandmother      Breast cancer Mother's Sister Laura Berrios 74    Cancer Mother's Sister Laura Berrios22     Cancer Other Laura Berrios     Miscarriages / Stillbirths Other Bettina Kowalski

## 2025-06-25 ENCOUNTER — APPOINTMENT (OUTPATIENT)
Dept: PRIMARY CARE | Facility: CLINIC | Age: 65
End: 2025-06-25
Payer: MEDICARE

## 2025-06-30 NOTE — PROGRESS NOTES
It was a pleasure to see Ms. Kowalski at the Neurosurgery Spine Clinic at [facility].     She is a really nice 65 y.o. female  who presents to us with complaints LOW BACK PAIN radiating to RIGHT LEG that started about  6 months ago, and have been gradually worsening since that time.  The symptoms started after no known injury    The pain is 3 /10. The pain is described as sharp and throbbing and occurs all day.  Symptoms are exacerbated by nothing in particular. Factors which relieve the pain include nothing      Numbness and/or tingling - YES    Weakness : YES    Bladder/Bowel symptoms - YES    The patient has tried medications (eg: gabapentin, NSAIDS and narcotics ) : Yes  PT : Yes    Date: June 2025  Pain Management with ESIs/selective nerve blocks  - YES    she is a NON-SMOKER and NON-DIABETIC    History of Depression : NO    PRIOR SPINE SURGERY: YES.  She underwent a previous L3-5 fusion with me in 2021 for her L4-5 synovial cyst and L3-4 spondylolisthesis from which she did exceedingly well.    Denies use of Aspirin, Coumadin, or Plavix or any other anticoagulants Using Aspirin    NARCOTICS for pain : NO    Part of this patient’s history is from personal review of the patient’s previous charts.      Medical History[1]      Current Medications[2]      Review of Systems :   Constitutional: No fever or chills  Respiratory: No shortness of breath or wheezing  Musculoskeletal: see HPI above   Neurologic: See HPI above    EXAM:   There were no vitals filed for this visit.  NEURO: Neurologically patient is awake alert and oriented X 3    No obvious cranial nerve deficit.  Strength is almost 5/5 throughout all motor groups tested but she does have presence of weakness involving right hip flexion which is 3 x 5 and knee extension which is 4 - by 5.  Deep tendon reflexes are symmetric throughout right knee jerk.  Gait : Antalgic  Sensory examination is intact to touch and painful stimuli but she does have  numbness involving the right L2/L3 dermatome      IMAGING:   MRI of the lumbar spine that was done on June 9, 2025 was reviewed personally and shows presence of a previous L3-5 fusion with very good decompression at the operative levels but presence of a very large disc herniation with upward migration behind the body of L2 causing severe nerve root compression.  AP and lateral x-ray of the lumbar spine shows no evidence of instrumentation failure.          ASSESSMENT AND PLAN:  Ms. Kowalski is a really nice 65 y.o. female who presents to me with symptoms of low back in the right lower extremity pain that she mentioned started in November 2024.  She has been having weakness involving the right lower extremity along with numbness and tingling in the right lateral thigh up to the knee.  She has previous history of an L3-5 fusion in 2021 from which she did exceedingly well and had no symptoms whatsoever until she had the symptoms in November 2024.  It seems like she also had associated hip symptoms and underwent hip arthroplasty in February from which she has been doing okay but continues to have pain in the low back and right lower extremity along with the weakness that prompted an MRI.  She was recommended to see me after the MRI.    I reviewed the MRI with the patient and explained to her that she has presence of a very large at L2-3 disc herniation causing severe nerve root compression and is likely the cause of her low back and right lower extremity weakness and numbness and tingling.  She has tried pain management with 1 infusion with very little relief.      I explained to her that given the presence of significant weakness involving the right hip flexion and knee extension and presence of very large disc herniation I believe she is a candidate for surgery and recommended the same especially if her symptoms are bad enough that they are affecting her activities of daily living.  She feels that she would want to  consider surgery but at this point of time she is just recovering from her hip surgery and would want to wait before she can get another procedure done.    Explained to her the option of continued pain management intervention in the meantime if she would wish and also physical therapy if she would want to do it.  Having said that given the presence of functional limiting motor weakness I do believe surgery will be the best option for her especially if she is symptomatic enough.    She cannot go over everything that we discussed today and will let us know if she would to consider surgery.  If not she can continue with nonoperative treatment intervention as necessary but would include symptomatic pain medications with muscle relaxants and proceeding beneficial.    It was a pleasure to participate in Ms. Kowalski clinical care. All questions were answered to her satisfaction and she explained understanding of the further treatment plan.     Suhail Ayala MD, [facility]   of Neurological Surgery  [employer]  Attending Surgeon  Director - Minimally Invasive Spine Surgery  [place], [place]      ---Some of this note was completed using Dragon voice recognition technology and sometimes the software misinterprets words. This may include unintended errors with respect to translation of words, typographical errors or grammar errors which may not have been identified prior to finalization of the chart note. Please take this into account when reading this note---              [1]   Past Medical History:  Diagnosis Date    Anemia     Arthritis unsure    unsure of the year    Asthma     stable    B12 deficiency     Cataract, bilateral     Cerebral herniation (Multi)     Chronic insomnia     Depression     Diverticulosis     2013    History of blood transfusion 1984    History of Chiari malformation     stable no symptoms    History of  "prediabetes     Hyperlipidemia     stable without medication    Hypertension     stable without medication    Joint pain 2013    Low back pain     Multinodular goiter     presently no medication    Neuromuscular disorder (Multi) 11/2024    Obesity     Pelvic floor weakness     Prediabetes     Primary osteoarthritis of right hip     Plan: Right Hip Total Arthroplasty Anterior Approach 1/7/25    Right lumbar radiculopathy     Seropositive rheumatoid arthritis    [2]   Current Outpatient Medications:     albuterol 90 mcg/actuation inhaler, Inhale 2 puffs every 4 hours if needed for wheezing., Disp: 18 g, Rfl: 3    aspirin 81 mg EC tablet, Take 1 tablet (81 mg) by mouth once daily., Disp: , Rfl:     BD Ultra-Fine Mini Pen Needle 31 gauge x 3/16\" needle, , Disp: , Rfl:     metFORMIN (Glucophage) 500 mg tablet, Take 1 tablet (500 mg) by mouth 2 times daily (morning and late afternoon). (Patient not taking: Reported on 1/31/2025), Disp: 180 tablet, Rfl: 1    NON FORMULARY, Disability parking placard needed for permanent lifetime orthopaedic disability. (Patient not taking: Reported on 5/5/2025), Disp: , Rfl:     "

## 2025-07-01 ENCOUNTER — APPOINTMENT (OUTPATIENT)
Dept: NEUROSURGERY | Facility: CLINIC | Age: 65
End: 2025-07-01
Payer: MEDICARE

## 2025-07-01 ENCOUNTER — OFFICE VISIT (OUTPATIENT)
Dept: NEUROSURGERY | Facility: CLINIC | Age: 65
End: 2025-07-01
Payer: MEDICARE

## 2025-07-01 VITALS
DIASTOLIC BLOOD PRESSURE: 86 MMHG | HEART RATE: 83 BPM | SYSTOLIC BLOOD PRESSURE: 133 MMHG | BODY MASS INDEX: 34.66 KG/M2 | WEIGHT: 203 LBS | HEIGHT: 64 IN

## 2025-07-01 DIAGNOSIS — M41.9 SCOLIOSIS, UNSPECIFIED SCOLIOSIS TYPE, UNSPECIFIED SPINAL REGION: ICD-10-CM

## 2025-07-01 DIAGNOSIS — Z98.1 LUMBAR SPINAL STENOSIS DUE TO ADJACENT SEGMENT DISEASE AFTER FUSION PROCEDURE: Primary | ICD-10-CM

## 2025-07-01 DIAGNOSIS — M48.061 LUMBAR SPINAL STENOSIS DUE TO ADJACENT SEGMENT DISEASE AFTER FUSION PROCEDURE: Primary | ICD-10-CM

## 2025-07-01 DIAGNOSIS — M51.369 LUMBAR SPINAL STENOSIS DUE TO ADJACENT SEGMENT DISEASE AFTER FUSION PROCEDURE: Primary | ICD-10-CM

## 2025-07-01 PROCEDURE — 1125F AMNT PAIN NOTED PAIN PRSNT: CPT | Performed by: NEUROLOGICAL SURGERY

## 2025-07-01 PROCEDURE — 99214 OFFICE O/P EST MOD 30 MIN: CPT | Performed by: NEUROLOGICAL SURGERY

## 2025-07-01 PROCEDURE — 3008F BODY MASS INDEX DOCD: CPT | Performed by: NEUROLOGICAL SURGERY

## 2025-07-01 PROCEDURE — 3079F DIAST BP 80-89 MM HG: CPT | Performed by: NEUROLOGICAL SURGERY

## 2025-07-01 PROCEDURE — 3075F SYST BP GE 130 - 139MM HG: CPT | Performed by: NEUROLOGICAL SURGERY

## 2025-07-01 PROCEDURE — 1159F MED LIST DOCD IN RCRD: CPT | Performed by: NEUROLOGICAL SURGERY

## 2025-07-01 ASSESSMENT — PAIN SCALES - GENERAL: PAINLEVEL_OUTOF10: 3

## 2025-08-03 ASSESSMENT — RHEUMATOLOGY NEW PATIENT QUESTIONNAIRE
HOARSE VOICE: N
ABNORMAL URINE: Y
LOSS OF VISION: N
HEARTBURN OR REFLUX: N
HOW WOULD YOU DESCRIBE YOUR STIFFNESS ON AVERAGE: SEVERE
SKIN TIGHTNESS: N
BEHAVIORAL CHANGES: N
NODULES/BUMPS: N
NUMBNESS OR TINGLING IN HANDS OR FEET: Y
MORNING STIFFNESS IN LOWER BACK: Y
BLACK STOOLS: N
DIFFICULTY SWALLOWING: N
EXCESSIVE HAIR LOSS (MORE THAN YOUR NORM): N
SWOLLEN LEGS OR FEET: Y
EYE PAIN: N
SORES IN MOUTH OR NOSE: N
EASY BRUISING: N
DIFFICULTY FALLING ASLEEP: Y
FEVER: N
EASILY LOSING TEMPER: N
UNUSUALLY RAPID OR SLOWED HEART RATE: Y
RASH OR ULCERS: N
JOINT PAIN: Y
DIFFICULTY STAYING ASLEEP: Y
UNUSUAL FATIGUE: Y
VAGINAL DRYNESS: Y
EYE REDNESS: N
UNEXPLAINED WEIGHT CHANGE: Y
SHORTNESS OF BREATH: Y
SEIZURES: N
SUN SENSITIVE (SUN ALLERGY): N
LOSS OF CONSCIOUSNESS: N
SKIN REDNESS: N
COUGH: N
MEMORY LOSS: Y
EYE DRYNESS: Y
FAINTING: N
PAIN OR BURNING ON URINATION: N
VOMITING OF BLOOD OR COFFEE GROUND CONSISTENCY MATERIAL: N
MUSCLE WEAKNESS: Y
AGITATION: N
DIFFICULTY BREATHING LYING DOWN: Y
JAUNDICE: N
ANXIETY: N
BLOOD IN STOOLS: N
DEPRESSION: N
UNUSUAL BLEEDING: N
MORNING STIFFNESS: Y
DOUBLE OR BLURRED VISION: Y
JOINT SWELLING: Y
ANEMIA: Y
NAUSEA: N
INCREASED SUSCEPTIBILITY TO INFECTION: N
SWOLLEN OR TENDER GLANDS: N
DRYNESS OF MOUTH: N
PERSISTENT DIARRHEA: N
STOMACH PAIN: N
UNEXPLAINED HEARING LOSS: N
RASH: N
COLOR CHANGES OF HANDS OR FEET IN THE COLD: Y
NIGHT SWEATS: Y
HEADACHES: Y
CHEST PAIN: N

## 2025-08-03 NOTE — PROGRESS NOTES
Rheumatology Note      Patient Bettina Kowalski  MRN [id number]     CC: Ms. Bettian Kowalski is a 65 y.o. female presents to the clinic for establishing rheumatic care.     Subjective    Subjective   History of Presenting Illness  Ms. Bettina Kowalski is a 65 y.o. female with a history of Chiari malformation, chronic pain with wide spread DJD, hx of L2-L3 surgery, osteoarthritis, left TKA around 2023 , lumbar fusion around 2022, nonerosive seropositive RA (+RF, +CCP) , coming in today with EOC.       Patient reports that she has been hurting in her joints. Mainly in the back pain that radiates to the L knee. Pain wakes her at night. Worse with activity. Relieved with leaning forward.  - has some pain in the MCP and PIP but thinks that back pain is worse. Has numbness in the hands as well. Has been taking NSAIDs with no help.     - has raynaud's. Her fingers change to pink, blue, and white.mentioned using artificial tears a lot.   - reports morning stiffness for couple of years that usually last 30 minutes.     - Denies, fevers , unintentional weight loss, rashes, alopecia, mouth sores, nasal ulcers,malar rash, dry eyes, dry mouth, blood clots, mucus or blood in stool      Current IS   None       Past IS   - methotrexate -- lost to follow up. Hasn't refilled it.         Recall hx:   She was initially presented in 2023 with diffuse joint pain, morning stiffness. Was started on methotrexate but lost to follow up.  hasn't seen a rheumatologist in 2 yrs.           SH/FH; hx of 1 miscarriage, is s.p hysterectomy, mother had RA                ROS:  Constitutional: Denies fever,  fatigue  Head: Denies headaches or hair loss  Eyes: Denies dry eyes, blurry vision, redness of the eyes, pain in the eyes or H/O uveitis  ENT: Denies dry mouth, loss of taste, sores in the mouth, nose bleed, or difficulty swallowing  Cardiovascular: Denies chest pain, palpitations  Respiratory: Denies shortness of breath or cough or  "wheezing  Gastrointestinal: Denies nausea, vomiting, heartburn, abdominal pain , diarrhea or blood in the stool  Genitourinary: No urinary urgency, frequency, dysuria   Integumentary: Denies photosensitivity, rash or lesions, Raynaud's or psoriasis  Neurological: Denies any numbness or tingling or weakness  Hematologic/Lymphatic: Denies bleeding, bruising, Raynaud's phenomenon  MSK: As per HPI. No enthesitis, sausage finger, finger tip ulceration, or back pain    Medical History[1]     RX Allergies[2]     Objective   Objective     /77   Pulse 92   Temp 36.6 °C (97.8 °F)   Ht 1.626 m (5' 4\")   Wt 92.1 kg (203 lb)   LMP  (LMP Unknown)   BMI 34.84 kg/m²      PHYSICAL EXAM:  General - NAD, pleasant, AAOx3  Head: Normocephalic, atraumatic  Eyes - PERRLA, EOMI. No conjunctiva injection.   Mouth/ENT - Moist oral and nasal mucosa. No facial rash. No enlarged parotid or submandibular gland. Adequate salivary pooling.  Cardiovascular - Regular rate and rhythm. No murmurs or rubs.  Lungs - Clear to auscultation bilaterally.   Skin - No rashes or ulcers. No erythema on bilateral cheeks.  Abdomen - Soft, non-tender. No masses.   Extremities - No edema, cyanosis ,or clubbing  Neurological - Alert and oriented x 3,  grossly intact. No focal deficit.    Musculoskeletal  Shoulders: Full ROM, without pain, no swelling, warmth or tenderness.  Elbows: Full ROM, without pain, no swelling, warmth or tenderness.  Wrists: Full ROM, without pain, no swelling, warmth or tenderness.  MCP: mild vulgus deformity. Mild  tenderness   PIP: No swelling, warmth but mild tenderness.  DIP: No swelling, warmth or tenderness.  Hands : 5/5.    Sacroiliac joints: No local tenderness. MUSA negative.   Hips: Full ROM.  No malalignment.  Knees:  Full ROM, without pain, no swelling, warmth or point tenderness. No joint line tenderness, no pes anserine tenderness.  Ankles: Full ROM, without pain, swelling, warmth or tenderness.  Toes: No " "swelling, warmth or tenderness. Metatarsal squeeze negative  Cervical spine: No tenderness or limitation of movement  Lumbar spine: No tenderness or limitation of movement     LABS:  Lab Results   Component Value Date    WBC 5.4 05/28/2025    HGB 12.3 05/28/2025    HCT 38.3 05/28/2025    MCV 90.1 05/28/2025     05/28/2025      Lab Results   Component Value Date    GLUCOSE 83 05/28/2025    CO2 29 05/28/2025    BUN 15 05/28/2025    EGFR 97 05/28/2025    CALCIUM 10.0 05/28/2025    ALBUMIN 4.4 05/28/2025      Lab Results   Component Value Date    CRP 0.21 05/01/2023     Lab Results   Component Value Date    SEDRATE 32 (H) 05/01/2023     No results found for: \"C3\", \"C4\"  Lab Results   Component Value Date    RF 27 (H) 05/01/2023    CITAB 205 (H) 05/01/2023     No results found for: \"ANATITER\", \"ARNP\", \"ASMRN\", \"ASSA\", \"ASSB\", \"ASCL\", \"JO1\", \"ACHR\", \"ACEN\", \"RIBPP\", \"DNADS\", \"ANCPA\", \"ANCTI\", \"PR3\", \"MPO\"  Lab Results   Component Value Date    PROTUR NEGATIVE 05/28/2025    GLUCOSEU NEGATIVE 05/28/2025    BLOODU NEGATIVE 05/28/2025    KETONESU NEGATIVE 05/28/2025    NITRITEU NEGATIVE 05/28/2025    LEUKOCYTESU TRACE (A) 05/28/2025      Latest Reference Range & Units 05/01/23 13:56   NEGRO NEGATIVE  NEGATIVE   Rheumatoid Factor 0 - 15 IU/mL 27 (H)   C-Reactive Protein mg/dL 0.21   Citrulline Antibody, IgG 0 - 19 Units 205 (H)   IgG 700 - 1,600 mg/dL 1,310   IgM 40 - 230 mg/dL 108   IgA 70 - 400 mg/dL 325   (H): Data is abnormally high      IMAGING:   MRI 6/2/25  IMPRESSION:  1. The L2-3 spinal canal and lateral recesses are severely stenosed  by new disc extrusion extending into the right foramen with a  fragment of disc extending superiorly to the right L2 pedicle.      2. Additional degenerative changes as noted.                  Assessment    Assessment & Plan   Ms. Bettina Kowalski is a 65 y.o. female who presents with seropositive RA.       Seropositive RA (+ CCP and RF).   Patient with hx of seropositive non erosive " RA. Has been going on for the past couple of years. Would rate her pain of MCP/PIP as minimal at the time. Will evaluate her disease activity with ESR, CRP, and hand xrays. Also, will start her on  mg (given her weight) for her pain.       Wide spread DJD  Hx of R BARB, L4-L5 discectomy, L TKA   Spinal stenosis   Multiple imaging with moderate to severe DJD in the L hip, wrist, b/l CMC, and  Lumbar stenosis. Thinks that this is her main concern today. Pain is consistent with spinal stenosis. Advised to follow with ortho at the time.          Independently reviewed three or more labs  Images including CXR, CT, MRI independently reviewed.   Monitoring for drug toxicity     Case seen and discussed with Dr. Kapoor  Signature: Yuri Viramontes MD  Date: August 4, 2025         [1]   Past Medical History:  Diagnosis Date    Anemia     Arthritis unsure    unsure of the year    Asthma     stable    B12 deficiency     Cataract, bilateral     Cerebral herniation (Multi)     Chronic insomnia     Depression     Diverticulosis     2013    History of blood transfusion 1984    History of Chiari malformation     stable no symptoms    History of prediabetes     Hyperlipidemia     stable without medication    Hypertension     stable without medication    Joint pain 2013    Low back pain     Multinodular goiter     presently no medication    Neuromuscular disorder (Multi) 11/2024    Obesity     Pelvic floor weakness     Prediabetes     Primary osteoarthritis of right hip     Plan: Right Hip Total Arthroplasty Anterior Approach 1/7/25    Right lumbar radiculopathy     Seropositive rheumatoid arthritis    [2]   Allergies  Allergen Reactions    Metronidazole Hives

## 2025-08-04 ENCOUNTER — APPOINTMENT (OUTPATIENT)
Dept: RHEUMATOLOGY | Facility: CLINIC | Age: 65
End: 2025-08-04
Payer: MEDICARE

## 2025-08-04 VITALS
TEMPERATURE: 97.8 F | WEIGHT: 203 LBS | BODY MASS INDEX: 34.66 KG/M2 | DIASTOLIC BLOOD PRESSURE: 77 MMHG | HEIGHT: 64 IN | HEART RATE: 92 BPM | SYSTOLIC BLOOD PRESSURE: 135 MMHG

## 2025-08-04 DIAGNOSIS — M05.9 SEROPOSITIVE RHEUMATOID ARTHRITIS: Primary | ICD-10-CM

## 2025-08-04 PROCEDURE — 99204 OFFICE O/P NEW MOD 45 MIN: CPT

## 2025-08-04 PROCEDURE — 1125F AMNT PAIN NOTED PAIN PRSNT: CPT

## 2025-08-04 PROCEDURE — 3008F BODY MASS INDEX DOCD: CPT

## 2025-08-04 PROCEDURE — 3078F DIAST BP <80 MM HG: CPT

## 2025-08-04 PROCEDURE — 3075F SYST BP GE 130 - 139MM HG: CPT

## 2025-08-04 PROCEDURE — 1159F MED LIST DOCD IN RCRD: CPT

## 2025-08-04 RX ORDER — HYDROXYCHLOROQUINE SULFATE 200 MG/1
400 TABLET, FILM COATED ORAL DAILY
Qty: 60 TABLET | Refills: 5 | Status: SHIPPED | OUTPATIENT
Start: 2025-08-04 | End: 2026-01-31

## 2025-08-04 ASSESSMENT — PATIENT HEALTH QUESTIONNAIRE - PHQ9
1. LITTLE INTEREST OR PLEASURE IN DOING THINGS: NOT AT ALL
SUM OF ALL RESPONSES TO PHQ9 QUESTIONS 1 & 2: 0
2. FEELING DOWN, DEPRESSED OR HOPELESS: NOT AT ALL

## 2025-08-04 ASSESSMENT — PAIN SCALES - GENERAL: PAINLEVEL_OUTOF10: 6

## 2025-08-21 ENCOUNTER — HOSPITAL ENCOUNTER (OUTPATIENT)
Dept: RADIOLOGY | Facility: HOSPITAL | Age: 65
Discharge: HOME | End: 2025-08-21
Payer: COMMERCIAL

## 2025-08-21 DIAGNOSIS — E78.5 HYPERLIPIDEMIA, UNSPECIFIED HYPERLIPIDEMIA TYPE: ICD-10-CM

## 2025-08-21 PROCEDURE — 75571 CT HRT W/O DYE W/CA TEST: CPT

## 2025-09-24 ENCOUNTER — APPOINTMENT (OUTPATIENT)
Dept: PRIMARY CARE | Facility: CLINIC | Age: 65
End: 2025-09-24
Payer: MEDICARE

## (undated) DEVICE — SUTURE, STRATAFIX PDS PLUS, 1, OS-8, SYMMETRIC 60CM DYED

## (undated) DEVICE — CLOSURE SYSTEM, DERMABOND, PRINEO, 22CM, STERILE

## (undated) DEVICE — SUTURE, STRATAFIX, 3-0 MONOCRYL PLUS, PS-2 SPIRAL UNDYED

## (undated) DEVICE — GLOVE, SURGICAL, PROTEXIS PI BLUE W/NEUTHERA, 8.0, PF, LF

## (undated) DEVICE — COVER, C-ARM W/CLIPS, OEC GE

## (undated) DEVICE — SUTURE, VICRYL, 1, 36 IN, CTX, VIOLET

## (undated) DEVICE — Device

## (undated) DEVICE — DRESSING, MEPILEX BORDER, POST-OP AG, 4 X 8 IN

## (undated) DEVICE — TOWEL, SURGICAL, NEURO, O/R, 16 X 26, BLUE, STERILE

## (undated) DEVICE — GLOVE, SURGEON, PREMIERPRO PI, ORTHO, SZ-7.5, PF, LT GRN

## (undated) DEVICE — GOWN, SURGICAL, UROLOGY, IMPERVIOUS, XLONG, XLARGE, DISPOSABLE

## (undated) DEVICE — DRAPE, ISOLATION, ANTIMICROBIAL, W/POUCH, IOBAN 2, STERI DRAPE, 125 X 83 IN, DISPOSABLE, STERILE

## (undated) DEVICE — DRAPE, SHEET, THREE QUARTER, FAN FOLD, 57 X 77 IN

## (undated) DEVICE — POSITION KIT, HANA PROFX SPINE

## (undated) DEVICE — SYRINGE, 60 CC, IRRIGATION, BULB, CONTRO-BULB, PAPER POUCH

## (undated) DEVICE — BLADE, SAW, SAGITTAL, DUAL CUT, 18 X 90 X 1.27

## (undated) DEVICE — SUTURE, CTD, VICRYL, 2-0, UND, BR, CT-2

## (undated) DEVICE — INTERPULSE HANDPIECE SET W/ 10FT SUCTION TUBING

## (undated) DEVICE — KIT, MINOR, DOUBLE BASIN